# Patient Record
Sex: FEMALE | Race: WHITE | NOT HISPANIC OR LATINO | Employment: UNEMPLOYED | URBAN - METROPOLITAN AREA
[De-identification: names, ages, dates, MRNs, and addresses within clinical notes are randomized per-mention and may not be internally consistent; named-entity substitution may affect disease eponyms.]

---

## 2017-12-11 ENCOUNTER — ALLSCRIPTS OFFICE VISIT (OUTPATIENT)
Dept: OTHER | Facility: OTHER | Age: 57
End: 2017-12-11

## 2017-12-12 ENCOUNTER — ALLSCRIPTS OFFICE VISIT (OUTPATIENT)
Dept: OTHER | Facility: OTHER | Age: 57
End: 2017-12-12

## 2017-12-12 DIAGNOSIS — E11.9 TYPE 2 DIABETES MELLITUS WITHOUT COMPLICATIONS (HCC): ICD-10-CM

## 2017-12-12 DIAGNOSIS — E78.5 HYPERLIPIDEMIA: ICD-10-CM

## 2017-12-12 NOTE — PROGRESS NOTES
Assessment  Assessed   1  Tachycardia (785 0) (R00 0)  2  Tobacco use (305 1) (Z72 0)  3  Hyperlipidemia (272 4) (E78 5)  4  Type 2 diabetes mellitus (250 00) (E11 9)  5  Non-compliant patient (V15 81) (Z91 19)    Plan  Chest pain, Murmur, Tachycardia    · EKG/ECG- POC; Status:Active - Perform Order,Retrospective By Protocol Authorization; Requested for:[id number];   Perform: In Office; Due:60Cez8541; Last Updated Cyndy Jeff; 12/11/2017 11:09:13 AM;Ordered; For:Chest pain, Murmur, Tachycardia; Ordered By:Maribell Rogers; Discussion/Summary  Cardiology Discussion Summary Free Text Note Form [facility]:   30-year-old female with known history of diabetes hypertension hyperlipidemia smoking-negative stress test for ischemia-in 2015-----presents after 2-1/2 year period--with failure  to take any of her medicines or see any medical physicians-over the past year--- has symptoms of polyuria polydipsia weight loss and tachycardia--all consistent with dehydration and uncontrolled diabetes--- patient is in no distress and has no symptoms of angina or CHF--no symptoms of bleeding fevers infection depression  was apologetic for failing to take her medicines keeping regular appointments etc  and lengthy discussion with patient was undertaken relating the importance of regular medical care particular with underlying serious conditions--- I will request a prompt appointment with internal medicine and patient will need complete cancer testing laboratory testing thyroid testing vaccinations and reinitiation of diabetic medicines--- patient was advised to report to emergency room should any symptoms at all develop-that are changed from her baseline  likely recovering from shingles and has residual healing rash right [id number] with dermatology if any problems develop her previous burning and discomforts have resolved    patient is cardiovascular stable   No angina, CHF or symptomatic ectopy-other than her appreciation of faster heartbeats with exertion likely reflecting sinus tachycardia due to volume depletion    concepts of signs and symptoms of heart disease-including ischemia arrhythmia CHF  of complete Smoking cessation discussedany problems promptly to medical attention  measures reviewed  agrees to be compliant with appointment with internal medicine primary care doctor promptly for complete medical evaluation and treatment-she also reminded to follow-up with ophthalmology podiatry and to embark on complete smoking cessation--emphasized need for regular follow-ups and be compliant with medical therapyquestions answered and patient understood the importance messages of this conversation  appointment with internal medicine was requested  Chief Complaint  Chief Complaint Free Text Note Form: Patient seen for follow-up cardiac evaluation--she presents after not being seen in over 2-1/2 years  Known history of diabetes HTN and HPL  smoking      History of Present Illness  Cardiology HPI Free Text Note Form [facility]: Patient presents to the office for cardiac evaluation  --Last visit was April 2015--- patient states that she has not been taking any of her medicines for at least one year--she continues to smoke greater than a half a pack per day--she states she's lost 20 pounds over the past year--she complains of polyuria polydipsia--- she states she had recent painful rash on her right back radiating to her ribs anteriorly-about 2 months ago which has resolved--she thought it was shingles due to the pain and burning which appears likely rashes almost resolved  is walking up and down the halls and in and out of the office from the parking lot with no complaints of exertional chest pains no PND edema orthopnea no wheezing hemoptysis congestive heart failure--does feel her heart beating faster when she does physical activity--no dizziness lightheadedness syncope seizures --no feelings of irregular pulse rates fast pulse rates at rest--no melena hematuria hematochezia dysphasia nausea vomiting melena hematuria hematochezia headaches diplopia claudication  her own cleaning and shopping and cooking without difficulty no exertional ischemic symptoms    has not gone for mammograms colonoscopies cancer screening testing and has had no blood tests since 2015has never seen PCP as directedthe importance of maintaining good sugar control and that many of her symptoms of weight loss frequent urination frequent thirst lack of energy may reflect large component of uncontrolled diabetes-she was advised she'll need complete evaluation for other diseases which may coexist  shows sinus tachycardia with wide spread ST-T wave abnormalities--patient has known ST-T wave abnormalities in the past  denies dysphasia depression trauma headaches diplopia presyncope claudication myositis unusual travel diarrhea melena hematuria hematochezia abdominal pain shaking chills  relate that over the last year she's been progressively feeling more fatigued with less energy      Known history of HTN and HPL  She is complaining of half of fourth and all of her fifth left digit numbness x 6-7 months  Denies any injuries  She is also complaining of feeling a left sided chest pain with radiation into her left neck and arm when laying on her left side - she reports that it is mild and she is able to fall asleep with it  Denies any associated SOB  Feels better with movement and sitting up  Occasionally she does feel it randomly during the day  She describes it as an ache  She denies any exertional chest pains or shortness of breath  No angina, CHF, palpitations, syncope, seizures, CVA/TIA, dizziness, lightheadedness, amaurosis, orthostasis, myositis or edema  No urinary or bowel complaints  No rashes, fevers, arrhythmic symptoms, thromboembolic symptoms  She is complaining of looser bowels and diarrhea - she is on metformin  No change in urine or increased urination   She is able to physical activities with no problems of chest pains shortness of breath PND edema orthopnea orthostasis myositis claudication no history of MI no coronary calcifications on CAT scans in the past normal LV function on echo was in the past  has not stop smoking no wheezes hemoptysis night [id number]- normal LVEF  has a history of HTN - on atenolol, history of DM - on glybruide, history of hypertrigylceridermia - on Tricor  regular mammogram  No prior colonoscopy  [ Hypertension, hypertrigylceridermia, anxiety and DM  rheumatic fever, MI, thyroid problems, kidney issues, lung disease, CVA/TIA, hearing problems  ]  [ Mother  subdural hemorrhage, h/o CAHD s/p CABG in her 66's  Father  - h/o lung cancer  Brother - gout  ]  [ + Tobacco use    1 daughter, 1 son  wheezes hemoptysis night sweatsregular mammograms  CAT scan in  with no notation of CAD no emphysema  hemoglobin A1c 11 recent cholesterol 318       Review of Systems  Cardiology Female ROS:    Cardiac: No complaints of chest pain, no palpitations, no fainting -- Notes faster heartbeats with exertion without associated chest pains PND edema no CHF syncope lightheadedness  Skin: Rash located on the , but-- No complaints of nonhealing sores or skin rash  -- and-- as noted in HPI-- Recent shingles rash right side of her back  Genitourinary: No complaints of recurrent urinary tract infections, frequent urination at night, difficult urination, blood in urine, kidney stones, loss of bladder control, kidney problems, denies any birth control or hormone replacement, is not post menopausal, not currently pregnant  Psychological: No complaints of feeling depressed, anxiety, panic attacks, or difficulty concentrating  General: lack of energy/fatigue, but-- No complaints of trouble sleeping, lack of energy, fatigue, appetite changes, weight changes, fever, frequent infections, or night sweats  -- Notes less energy and strength over the past year   Respiratory: No complaints of shortness of breath, cough with sputum, or wheezing  HEENT: No complaints of serious problems, hearing problems, nose problems, throat problems, or snoring  Gastrointestinal: No complaints of liver problems, nausea, vomiting, heartburn, constipation, bloody stools, diarrhea, problems swallowing, adbominal pain, or rectal bleeding  Hematologic: No complaints of bleeding disorders, anemia, blood clots, or excessive brusing  Neurological: as noted in HPI-- and-- no numbness  Musculoskeletal: No complaints of arthritis, back pain, or painfull swelling  Active Problems  Problems   1  Arthropathy (716 90) (M12 9)  2  Carpal tunnel syndrome (354 0) (G56 00)  3  Chest pain (786 50) (R07 9)  4  Hyperlipidemia (272 4) (E78 5)  5  Hypertension (401 9) (I10)  6  Murmur (785 2) (R01 1)  7  Tachycardia (785 0) (R00 0)  8  Tobacco use (305 1) (Z72 0)  9  Type 2 diabetes mellitus (250 00) (E11 9)    Past Medical History  Problems   1  History of Anxiety (300 00) (F41 9)  2  History of Benign hypertension (401 1) (I10)  3  History of Encounter for screening mammogram for malignant neoplasm of breast (V76 12) (Z12 31)  4  History of Essential hypertriglyceridemia (272 1) (E78 1)  5  History of carpal tunnel syndrome (V12 49) (Z86 69)  6  History of chest pain (V13 89) (Z87 898)  7  History of sebaceous cyst (V13 3) (Z87 2)  8  Hyperlipidemia (272 4) (E78 5)  9  Type 2 diabetes mellitus (250 00) (E11 9)  Active Problems And Past Medical History Reviewed: The active problems and past medical history were reviewed and updated today  Surgical History  Problems   1  History of Cholecystectomy Laparoscopic  2  History of Knee Surgery  Surgical History Reviewed: The surgical history was reviewed and updated today  Family History  Mother   1  Family history of Acute Subdural Hematoma  2  Family history of CABG  3  Family history of Coronary Artery Disease (V17 49)  4   Family history of Family Health Status Of Mother -   Father   11  Family history of Family Health Status Of Father -   10  Family history of Lung Cancer (V16 1)  Brother   7  Family history of Gout (V18 19)  Family History Reviewed: The family history was reviewed and updated today  Social History  Problems    · Former smoker (U18 95) (A50 235)   · Marital History - Currently    · Tobacco use (305 1) (Z72 0)  Social History Reviewed: The social history was reviewed and updated today  Current Meds  1  Aspirin 81 MG Oral Tablet Delayed Release; TAKE 1 TABLET DAILY; Therapy: 24GTM1589 to Recorded  2  Atenolol 50 MG Oral Tablet; TAKE ONE TABLET BY MOUTH ONCE A DAY; Therapy: 66ZUZ5766 to (Evaluate:2016)  Requested for: 73Cho2005; Last Rx:[id number] Ordered  3  Chantix 1 MG Oral Tablet; TAKE 1 TABLET TWICE DAILY; Therapy: 65QAG1012 to (Evaluate:2014)  Requested for: 91VJV4511; Last Rx:37Htq0655 Ordered  4  Fenofibrate 145 MG Oral Tablet (Tricor); TAKE ONE TABLET BY MOUTH ONCE A DAY; Therapy: 01TWX1581 to (Evaluate:2016)  Requested for: 23Thr4206; Last Rx:31Zzl5471; Status: ACTIVE - Transmit to Pharmacy - Awaiting Verification Ordered  5  GlyBURIDE 5 MG Oral Tablet; TAKE TWO TABLETS BY MOUTH TWICE DAILY; Therapy: 14WXH8311 to (Evaluate:2016)  Requested for: 20Bth6523; Last Rx:86Vni1491 Ordered  6  Lantus SoloStar 100 UNIT/ML Subcutaneous Solution Pen-injector; inject 16 units in the evening; Therapy: 76Zld5247 to (Last Rx:87Qiz4881) Ordered  7  Lisinopril-Hydrochlorothiazide 10-12 5 MG Oral Tablet; TAKE TWO TABLETS BY MOUTH EVERY DAY; Therapy: 78QWR1711 to (Evaluate:2016)  Requested for: 66Mzz0741; Last Rx:74Kdt5974; Status: ACTIVE - Transmit to [facility] Verification Ordered  8  LORazepam 1 MG Oral Tablet; TAKE ONE TABLET BY MOUTH TWICE DAILY AS NEEDED FOR ANXIETY; Therapy: 42FAL5433 to (Evaluate:21Vrr3456); Last Rx:80Akw9653 Ordered  9   MetFORMIN HCl - 500 MG Oral Tablet; take 1 tab daily; Therapy: 95DAQ7714 to (Evaluate:16Apr2016)  Requested for: 22Apr2015; Last Rx:69Kut9468 Ordered  10  Omeprazole 20 MG Oral Capsule Delayed Release; TAKE ONE CAPSULE BY MOUTH  ONCE A DAY; Therapy: 40EXT4976 to 066 [id number] 0211)  Requested for: 52Syk1916; Last  Rx:74Hoj8158; Status: ACTIVE - Transmit to Pharmacy - Awaiting Verification Ordered  Medication List Reviewed: The medication list was reviewed and updated today  Allergies  Medication   1  Adhesive Bandages MISC  2  Adhesive Tape TAPE  Non-Medication   3  Adhesive Tape    Vitals  Vital Signs    Recorded: 69Zdb7309 10:46AM   Heart Rate 297   Systolic 497   Diastolic 84   Height 5 ft 4 in   Weight 112 lb 8 0 oz   BMI Calculated 19 31   BSA Calculated 1 54       Physical Exam   Constitutional  General appearance: No acute distress, well appearing and well nourished  -- No acute distress -frail thin-appearing female  Eyes  Ophthalmoscopic examination: Abnormal  -- Strabismus noted  Ears, Nose, Mouth, and Throat - External inspection of ears and nose: Normal without deformities or discharge  -- Oropharynx: Clear, nares are clear, mucous membranes are moist   Neck  Neck and thyroid: Normal, supple, trachea midline, no thyromegaly  Pulmonary  Respiratory effort: No increased work of breathing or signs of respiratory distress  Auscultation of lungs: Clear to auscultation, no rales, no rhonchi, no wheezing, good air movement  Cardiovascular  Palpation of heart: Normal PMI, no thrills  Auscultation of heart: Abnormal  -- Normal S1, S2  Soft FABBY LSB  Carotid pulses: Normal, 2+ bilaterally  Peripheral vascular exam: Normal pulses throughout, no tenderness, erythema or swelling  Abdominal aorta: Normal    Pedal pulses: Normal, 2+ bilaterally  Examination of extremities for edema and/or varicosities: Normal    Chest - Chest: Normal   Abdomen  Abdomen: Non-tender and no distention     Liver and spleen: No hepatomegaly or splenomegaly  Musculoskeletal Gait and station: Normal gait  -- Digits and nails: Normal without clubbing or cyanosis  Skin - Examination of the skin for lesions: Abnormal -- Suggestion of healing shingles rash right mid-upper back    Neurologic - Speech: Normal    Psychiatric - Orientation to person, place, and time: Normal -- Mood and affect: Normal       Signatures   Electronically signed by : PÉREZ Manzanares ; Dec 11 2017 11:44AM EST                       (Author)    Electronically signed by : PÉREZ Manzanares ; Dec 11 2017  4:30PM EST                       (Author)    Electronically signed by : PÉREZ Manzanares ; Dec 11 2017  4:40PM EST                       (Author)

## 2017-12-13 NOTE — PROGRESS NOTES
Assessment    1  Type 2 diabetes mellitus (250 00) (E11 9)   2  Hyperlipidemia (272 4) (E78 5)   3  Tachycardia (785 0) (R00 0)   4  Carpal tunnel syndrome (354 0) (G56 00)   5  Cigarette nicotine dependence without complication (444 1) (D10 772)    Plan  Cigarette nicotine dependence without complication    · We recommend you quit smoking  Time spent counseling today was greater than 3minutes ; Status:Complete;   Done: [id number]   · You need to quit smoking ; Status:Complete;   Done: 19SMN5593  Health Maintenance    · Zoster (Zostavax) (Zoster (Zostavax)); INJECT 1 UNT Once  Hyperlipidemia    · Atorvastatin Calcium 20 MG Oral Tablet; TAKE 1 TABLET DAILY   · Fenofibrate 145 MG Oral Tablet (Tricor); TAKE ONE TABLET BY MOUTH ONCE ADAY   · (1) LIPID PANEL, FASTING; Status:Active; Requested for:61Xjz4720;    · (1) TSH; Status:Active; Requested for:49Ayk9323; Tachycardia    · Metoprolol Succinate ER 25 MG Oral Tablet Extended Release 24 Hour; TAKE 1TABLET DAILY  Type 2 diabetes mellitus    · GlipiZIDE XL 10 MG Oral Tablet Extended Release 24 Hour; TAKE 1 TABLET DAILY   · Januvia 100 MG Oral Tablet; TAKE 1 TABLET DAILY   · (1) CBC/PLT/DIFF; Status:Active; Requested for:04Sxq8549;    · (1) COMPREHENSIVE METABOLIC PANEL; Status:Active; Requested for:73Iof5359;    · (1) HEMOGLOBIN A1C; Status:Active; Requested for:79Yne5689;    · (1) MICROALBUMIN CREATININE RATIO, RANDOM URINE; Status:Active; Requestedfor:82Pjx8633;     Discussion/Summary  Discussion Summary: This is a patient of Dr Shruthi Marks who has not been taking her medications for the last 2 years  2 diabetes-he was previously prescribed metformin and glyburide along with Lantus but she is not taking any of them  She did develop diarrhea with the metformin in the past  I will start her on Januvia and glipizide  She was told to get blood work done to see what her hemoglobin A1c level is  She was told to check her blood glucose levels at home   I will do the foot exam in the next visit  her blood pressure is normal  The metoprolol was started for the tachycardia -the lipid profile was very abnormal but she has not been taking her medication  Fenofibrate and atorvastatin were sent to the pharmacy  smoker-she was counseled to quit smoking  work was ordered  Counseling Documentation With Imm: The patient was counseled regarding diagnostic results,-- instructions for management,-- risk factor reductions,-- risks and benefits of treatment options,-- importance of compliance with treatment  Medication SE Review and Pt Understands Tx: Possible side effects of new medications were reviewed with the patient/guardian today  The treatment plan was reviewed with the patient/guardian  The patient/guardian understands and agrees with the treatment plan      Chief Complaint  Chief Complaint Free Text Note Form: establish care      History of Present Illness  Smoking Cessation (Brief): The patient is being seen for clinic follow-up for tobacco cessation assistance  The patient smokes cigarettes  Tobacco use began 40 year(s) ago  The patient smokes 1/2 pack per day  Carpal Tunnel Syndrome (Follow-Up): The patient is being seen for follow-up of bilateral carpal tunnel syndrome  The patient reports no change in the condition  Comorbid Illnesses: diabetes mellitus  She has had no significant interval events  The patient is currently asymptomatic  The patient is not currently on medication for this problem  Hyperlipidemia (Follow-Up): The patient states her hyperlipidemia has been stable since the last visit  Comorbid Illnesses: diabetes mellitus-- and-- hypertension  She has no significant interval events  Symptoms: The patient is currently asymptomatic  Medications: the patient is not adherent with her medication regimen  -- She denies medication side effects  Diabetes Type II (Follow-Up): The patient states she has been doing poorly with her Type II Diabetes control since the last visit  Comorbid Illnesses: hypertension,-- tobacco use-- and-- hyperlipidemia  She has no known diabetic complications  She has no significant interval events  Symptoms: The patient is currently asymptomatic  Medications: the patient is not adherent with her medication regimen  -- She denies medication side effects  Review of Systems  Complete-Female:  Constitutional: No fever, no chills, feels well, no tiredness, no recent weight gain or weight loss  Eyes: No complaints of eye pain, no red eyes, no eyesight problems, no discharge, no dry eyes, no itching of eyes  ENT: no complaints of earache, no loss of hearing, no nose bleeds, no nasal discharge, no sore throat, no hoarseness  Cardiovascular: No complaints of slow heart rate, no fast heart rate, no chest pain, no palpitations, no leg claudication, no lower extremity edema  Respiratory: No complaints of shortness of breath, no wheezing, no cough, no SOB on exertion, no orthopnea, no PND  Gastrointestinal: No complaints of abdominal pain, no constipation, no nausea or vomiting, no diarrhea, no bloody stools  Genitourinary: No complaints of dysuria, no incontinence, no pelvic pain, no dysmenorrhea, no vaginal discharge or bleeding  Musculoskeletal: No complaints of arthralgias, no myalgias, no joint swelling or stiffness, no limb pain or swelling  Integumentary: No complaints of skin rash or lesions, no itching, no skin wounds, no breast pain or lump  Neurological: No complaints of headache, no confusion, no convulsions, no numbness, no dizziness or fainting, no tingling, no limb weakness, no difficulty walking  Psychiatric: Not suicidal, no sleep disturbance, no anxiety or depression, no change in personality, no emotional problems  Endocrine: No complaints of proptosis, no hot flashes, no muscle weakness, no deepening of the voice, no feelings of weakness    Hematologic/Lymphatic: No complaints of swollen glands, no swollen glands in the neck, does not bleed easily, does not bruise easily  Active Problems  1  Arthropathy (716 90) (M12 9)   2  Carpal tunnel syndrome (354 0) (G56 00)   3  Hyperlipidemia (272 4) (E78 5)   4  Hypertension (401 9) (I10)   5  Murmur (785 2) (R01 1)   6  Non-compliant patient (V15 81) (Z91 19)   7  Tachycardia (785 0) (R00 0)   8  Tobacco use (305 1) (Z72 0)   9  Type 2 diabetes mellitus (250 00) (E11 9)    Past Medical History    1  History of Anxiety (300 00) (F41 9)   2  History of Benign hypertension (401 1) (I10)   3  History of Encounter for screening mammogram for malignant neoplasm of breast (V76 12) (Z12 31)   4  History of Essential hypertriglyceridemia (272 1) (E78 1)   5  History of carpal tunnel syndrome (V12 49) (Z86 69)   6  History of chest pain (V13 89) (Z87 898)   7  History of chest pain (V13 89) (Z87 898)   8  History of sebaceous cyst (V13 3) (Z87 2)   9  Hyperlipidemia (272 4) (E78 5)   10  Type 2 diabetes mellitus (250 00) (E11 9)  Active Problems And Past Medical History Reviewed: The active problems and past medical history were reviewed and updated today  Surgical History    1  History of Cholecystectomy Laparoscopic   2  History of Knee Surgery  Surgical History Reviewed: The surgical history was reviewed and updated today  Family History  Mother    1  Family history of Acute Subdural Hematoma   2  Family history of CABG   3  Family history of Coronary Artery Disease (V17 49)   4  Family history of Family Health Status Of Mother -   Father    11  Family history of Family Health Status Of Father -    10  Family history of Lung Cancer (V16 1)  Brother    7  Family history of Gout (V18 19)  Family History Reviewed: The family history was reviewed and updated today  Social History     · Former smoker (H80 60) (D31 132)   · Marital History - Currently    · Tobacco use (305 1) (Z72 0)  Social History Reviewed: The social history was reviewed and updated today   The social history was reviewed and is unchanged  Current Meds   1  Aspirin 81 MG Oral Tablet Delayed Release; TAKE 1 TABLET DAILY; Therapy: 13MDN5609 to Recorded   2  Fenofibrate 145 MG Oral Tablet; TAKE ONE TABLET BY MOUTH ONCE A DAY; Therapy: 75QIP4389 to (Evaluate:23Oct2016)  Requested for: 48Coh3020; Last Rx:67Pxk4419; Status: ACTIVE - Transmit to Pharmacy - Awaiting Verification Ordered    Allergies  1  Adhesive Bandages MISC   2  Adhesive Tape TAPE    3  Adhesive Tape    Vitals  Signs   Recorded: 71Rqy3585 01:06PM   Heart Rate: 568  Systolic: 617  Diastolic: 72  Height: 5 ft 4 in  Weight: 111 lb 4 oz  BMI Calculated: 19 1  BSA Calculated: 1 52  O2 Saturation: 98    Physical Exam   Constitutional  General appearance: No acute distress, well appearing and well nourished  Head and Face  Head and face: Normal    Palpation of the face and sinuses: No sinus tenderness  Eyes  Conjunctiva and lids: No swelling, erythema or discharge  Pupils and irises: Equal, round, reactive to light  Ears, Nose, Mouth, and Throat  External inspection of ears and nose: Normal    Otoscopic examination: Tympanic membranes translucent with normal light reflex  Canals patent without erythema  Oropharynx: Normal with no erythema, edema, exudate or lesions  Neck  Neck: Supple, symmetric, trachea midline, no masses  Thyroid: Normal, no thyromegaly  Pulmonary  Respiratory effort: No increased work of breathing or signs of respiratory distress  Auscultation of lungs: Clear to auscultation  Cardiovascular  Palpation of heart: Normal PMI, no thrills  Auscultation of heart: Normal rate and rhythm, normal S1 and S2, no murmurs  Examination of extremities for edema and/or varicosities: Normal    Abdomen  Abdomen: Non-tender, no masses  Liver and spleen: No hepatomegaly or splenomegaly  Lymphatic  Palpation of lymph nodes in neck: No lymphadenopathy     Musculoskeletal  Gait and station: Normal    Skin  Skin and subcutaneous tissue: Normal without rashes or lesions  Neurologic  Cranial nerves: Cranial nerves II-XII intact  Cortical function: Normal mental status  Reflexes: 2+ and symmetric  Sensation: No sensory loss  Psychiatric  Judgment and insight: Normal    Mood and affect: Normal        Future Appointments    Date/Time Provider Specialty Site   01/17/2018 12:45 PM PÉREZ Sherwood   [facility]       Signatures   Electronically signed by : PÉREZ Calderon ; Dec 12 2017  5:46PM EST                       (Author)

## 2018-01-22 VITALS
WEIGHT: 111.25 LBS | DIASTOLIC BLOOD PRESSURE: 72 MMHG | BODY MASS INDEX: 18.99 KG/M2 | OXYGEN SATURATION: 98 % | HEART RATE: 123 BPM | HEIGHT: 64 IN | SYSTOLIC BLOOD PRESSURE: 100 MMHG

## 2018-01-23 VITALS
SYSTOLIC BLOOD PRESSURE: 128 MMHG | HEIGHT: 64 IN | DIASTOLIC BLOOD PRESSURE: 84 MMHG | WEIGHT: 112.5 LBS | BODY MASS INDEX: 19.21 KG/M2 | HEART RATE: 100 BPM

## 2018-06-08 ENCOUNTER — APPOINTMENT (OUTPATIENT)
Dept: LAB | Facility: CLINIC | Age: 58
End: 2018-06-08
Payer: COMMERCIAL

## 2018-06-08 DIAGNOSIS — E78.5 HYPERLIPIDEMIA: ICD-10-CM

## 2018-06-08 DIAGNOSIS — E11.9 TYPE 2 DIABETES MELLITUS WITHOUT COMPLICATIONS (HCC): ICD-10-CM

## 2018-06-08 LAB
ALBUMIN SERPL BCP-MCNC: 3.5 G/DL (ref 3.5–5)
ALP SERPL-CCNC: 44 U/L (ref 46–116)
ALT SERPL W P-5'-P-CCNC: 23 U/L (ref 12–78)
ANION GAP SERPL CALCULATED.3IONS-SCNC: 8 MMOL/L (ref 4–13)
AST SERPL W P-5'-P-CCNC: 16 U/L (ref 5–45)
BASOPHILS # BLD AUTO: 0.06 THOUSANDS/ΜL (ref 0–0.1)
BASOPHILS NFR BLD AUTO: 1 % (ref 0–1)
BILIRUB SERPL-MCNC: 0.18 MG/DL (ref 0.2–1)
BUN SERPL-MCNC: 29 MG/DL (ref 5–25)
CALCIUM SERPL-MCNC: 9.4 MG/DL (ref 8.3–10.1)
CHLORIDE SERPL-SCNC: 106 MMOL/L (ref 100–108)
CHOLEST SERPL-MCNC: 168 MG/DL (ref 50–200)
CO2 SERPL-SCNC: 24 MMOL/L (ref 21–32)
CREAT SERPL-MCNC: 1.13 MG/DL (ref 0.6–1.3)
CREAT UR-MCNC: 61.7 MG/DL
EOSINOPHIL # BLD AUTO: 0.27 THOUSAND/ΜL (ref 0–0.61)
EOSINOPHIL NFR BLD AUTO: 2 % (ref 0–6)
ERYTHROCYTE [DISTWIDTH] IN BLOOD BY AUTOMATED COUNT: 12.4 % (ref 11.6–15.1)
EST. AVERAGE GLUCOSE BLD GHB EST-MCNC: 154 MG/DL
GFR SERPL CREATININE-BSD FRML MDRD: 54 ML/MIN/1.73SQ M
GLUCOSE P FAST SERPL-MCNC: 151 MG/DL (ref 65–99)
HBA1C MFR BLD: 7 % (ref 4.2–6.3)
HCT VFR BLD AUTO: 33.8 % (ref 34.8–46.1)
HDLC SERPL-MCNC: 36 MG/DL (ref 40–60)
HGB BLD-MCNC: 10.8 G/DL (ref 11.5–15.4)
IMM GRANULOCYTES # BLD AUTO: 0.06 THOUSAND/UL (ref 0–0.2)
IMM GRANULOCYTES NFR BLD AUTO: 1 % (ref 0–2)
LDLC SERPL CALC-MCNC: 71 MG/DL (ref 0–100)
LYMPHOCYTES # BLD AUTO: 2.57 THOUSANDS/ΜL (ref 0.6–4.47)
LYMPHOCYTES NFR BLD AUTO: 23 % (ref 14–44)
MCH RBC QN AUTO: 29.2 PG (ref 26.8–34.3)
MCHC RBC AUTO-ENTMCNC: 32 G/DL (ref 31.4–37.4)
MCV RBC AUTO: 91 FL (ref 82–98)
MICROALBUMIN UR-MCNC: 76.1 MG/L (ref 0–20)
MICROALBUMIN/CREAT 24H UR: 123 MG/G CREATININE (ref 0–30)
MONOCYTES # BLD AUTO: 0.6 THOUSAND/ΜL (ref 0.17–1.22)
MONOCYTES NFR BLD AUTO: 5 % (ref 4–12)
NEUTROPHILS # BLD AUTO: 7.85 THOUSANDS/ΜL (ref 1.85–7.62)
NEUTS SEG NFR BLD AUTO: 68 % (ref 43–75)
NONHDLC SERPL-MCNC: 132 MG/DL
NRBC BLD AUTO-RTO: 0 /100 WBCS
PLATELET # BLD AUTO: 571 THOUSANDS/UL (ref 149–390)
PMV BLD AUTO: 9.8 FL (ref 8.9–12.7)
POTASSIUM SERPL-SCNC: 3.8 MMOL/L (ref 3.5–5.3)
PROT SERPL-MCNC: 7.6 G/DL (ref 6.4–8.2)
RBC # BLD AUTO: 3.7 MILLION/UL (ref 3.81–5.12)
SODIUM SERPL-SCNC: 138 MMOL/L (ref 136–145)
TRIGL SERPL-MCNC: 303 MG/DL
TSH SERPL DL<=0.05 MIU/L-ACNC: 2.24 UIU/ML (ref 0.36–3.74)
WBC # BLD AUTO: 11.41 THOUSAND/UL (ref 4.31–10.16)

## 2018-06-08 PROCEDURE — 84443 ASSAY THYROID STIM HORMONE: CPT

## 2018-06-08 PROCEDURE — 82043 UR ALBUMIN QUANTITATIVE: CPT

## 2018-06-08 PROCEDURE — 85025 COMPLETE CBC W/AUTO DIFF WBC: CPT

## 2018-06-08 PROCEDURE — 82570 ASSAY OF URINE CREATININE: CPT

## 2018-06-08 PROCEDURE — 83036 HEMOGLOBIN GLYCOSYLATED A1C: CPT

## 2018-06-08 PROCEDURE — 3060F POS MICROALBUMINURIA REV: CPT | Performed by: INTERNAL MEDICINE

## 2018-06-08 PROCEDURE — 80061 LIPID PANEL: CPT

## 2018-06-08 PROCEDURE — 36415 COLL VENOUS BLD VENIPUNCTURE: CPT

## 2018-06-08 PROCEDURE — 80053 COMPREHEN METABOLIC PANEL: CPT

## 2018-06-11 RX ORDER — GLYBURIDE 5 MG/1
2 TABLET ORAL 2 TIMES DAILY
COMMUNITY
Start: 2014-03-31 | End: 2018-06-12

## 2018-06-11 RX ORDER — LISINOPRIL AND HYDROCHLOROTHIAZIDE 12.5; 1 MG/1; MG/1
2 TABLET ORAL DAILY
COMMUNITY
Start: 2014-03-31 | End: 2018-06-12

## 2018-06-11 RX ORDER — VARENICLINE TARTRATE 1 MG/1
1 TABLET, FILM COATED ORAL 2 TIMES DAILY
COMMUNITY
Start: 2014-06-17 | End: 2018-06-12

## 2018-06-11 RX ORDER — METOPROLOL SUCCINATE 25 MG/1
1 TABLET, EXTENDED RELEASE ORAL DAILY
COMMUNITY
Start: 2017-12-12 | End: 2018-06-12 | Stop reason: SDUPTHER

## 2018-06-11 RX ORDER — OMEPRAZOLE 20 MG/1
1 CAPSULE, DELAYED RELEASE ORAL DAILY
COMMUNITY
Start: 2014-03-31 | End: 2018-06-12 | Stop reason: SDUPTHER

## 2018-06-11 RX ORDER — ATORVASTATIN CALCIUM 20 MG/1
1 TABLET, FILM COATED ORAL DAILY
COMMUNITY
Start: 2017-12-12 | End: 2018-06-12 | Stop reason: SDUPTHER

## 2018-06-11 RX ORDER — ATENOLOL 50 MG/1
1 TABLET ORAL DAILY
COMMUNITY
Start: 2014-03-31 | End: 2018-06-12

## 2018-06-11 RX ORDER — GLIPIZIDE 10 MG/1
1 TABLET, FILM COATED, EXTENDED RELEASE ORAL DAILY
COMMUNITY
Start: 2017-12-12 | End: 2018-06-12 | Stop reason: SDUPTHER

## 2018-06-11 RX ORDER — FENOFIBRATE 145 MG/1
1 TABLET, COATED ORAL DAILY
COMMUNITY
Start: 2014-03-31 | End: 2018-06-12 | Stop reason: SDUPTHER

## 2018-06-11 RX ORDER — LORAZEPAM 1 MG/1
1 TABLET ORAL 2 TIMES DAILY PRN
COMMUNITY
Start: 2014-03-31 | End: 2018-06-12

## 2018-06-12 ENCOUNTER — OFFICE VISIT (OUTPATIENT)
Dept: INTERNAL MEDICINE CLINIC | Facility: CLINIC | Age: 58
End: 2018-06-12
Payer: COMMERCIAL

## 2018-06-12 VITALS
HEIGHT: 64 IN | HEART RATE: 112 BPM | WEIGHT: 133.2 LBS | DIASTOLIC BLOOD PRESSURE: 80 MMHG | BODY MASS INDEX: 22.74 KG/M2 | OXYGEN SATURATION: 100 % | SYSTOLIC BLOOD PRESSURE: 132 MMHG

## 2018-06-12 DIAGNOSIS — R00.0 TACHYCARDIA: ICD-10-CM

## 2018-06-12 DIAGNOSIS — N18.30 CKD (CHRONIC KIDNEY DISEASE) STAGE 3, GFR 30-59 ML/MIN (HCC): ICD-10-CM

## 2018-06-12 DIAGNOSIS — F17.210 CIGARETTE NICOTINE DEPENDENCE WITHOUT COMPLICATION: ICD-10-CM

## 2018-06-12 DIAGNOSIS — E11.42 TYPE 2 DIABETES MELLITUS WITH DIABETIC POLYNEUROPATHY, WITHOUT LONG-TERM CURRENT USE OF INSULIN (HCC): Primary | ICD-10-CM

## 2018-06-12 DIAGNOSIS — E78.2 MIXED HYPERLIPIDEMIA: ICD-10-CM

## 2018-06-12 DIAGNOSIS — G63 POLYNEUROPATHY ASSOCIATED WITH UNDERLYING DISEASE (HCC): ICD-10-CM

## 2018-06-12 DIAGNOSIS — K21.9 GERD WITHOUT ESOPHAGITIS: ICD-10-CM

## 2018-06-12 DIAGNOSIS — I10 ESSENTIAL HYPERTENSION: ICD-10-CM

## 2018-06-12 DIAGNOSIS — R79.89 ABNORMAL CBC: ICD-10-CM

## 2018-06-12 DIAGNOSIS — K59.04 CHRONIC IDIOPATHIC CONSTIPATION: ICD-10-CM

## 2018-06-12 PROCEDURE — 3066F NEPHROPATHY DOC TX: CPT | Performed by: INTERNAL MEDICINE

## 2018-06-12 PROCEDURE — 3079F DIAST BP 80-89 MM HG: CPT | Performed by: INTERNAL MEDICINE

## 2018-06-12 PROCEDURE — 3075F SYST BP GE 130 - 139MM HG: CPT | Performed by: INTERNAL MEDICINE

## 2018-06-12 PROCEDURE — 99215 OFFICE O/P EST HI 40 MIN: CPT | Performed by: INTERNAL MEDICINE

## 2018-06-12 PROCEDURE — 3008F BODY MASS INDEX DOCD: CPT | Performed by: INTERNAL MEDICINE

## 2018-06-12 RX ORDER — ATORVASTATIN CALCIUM 20 MG/1
20 TABLET, FILM COATED ORAL DAILY
Qty: 90 TABLET | Refills: 1 | Status: SHIPPED | OUTPATIENT
Start: 2018-06-12 | End: 2018-09-04 | Stop reason: SDUPTHER

## 2018-06-12 RX ORDER — FENOFIBRATE 145 MG/1
145 TABLET, COATED ORAL DAILY
Qty: 90 TABLET | Refills: 1 | Status: SHIPPED | OUTPATIENT
Start: 2018-06-12 | End: 2018-09-04 | Stop reason: SDUPTHER

## 2018-06-12 RX ORDER — GLIPIZIDE 10 MG/1
10 TABLET, FILM COATED, EXTENDED RELEASE ORAL DAILY
Qty: 90 TABLET | Refills: 1 | Status: SHIPPED | OUTPATIENT
Start: 2018-06-12 | End: 2018-09-04 | Stop reason: SDUPTHER

## 2018-06-12 RX ORDER — OMEPRAZOLE 20 MG/1
20 CAPSULE, DELAYED RELEASE ORAL DAILY
Qty: 90 CAPSULE | Refills: 1 | Status: SHIPPED | OUTPATIENT
Start: 2018-06-12 | End: 2018-09-04 | Stop reason: SDUPTHER

## 2018-06-12 RX ORDER — GABAPENTIN 300 MG/1
300 CAPSULE ORAL
Qty: 90 CAPSULE | Refills: 1 | Status: SHIPPED | OUTPATIENT
Start: 2018-06-12 | End: 2018-09-04 | Stop reason: SDUPTHER

## 2018-06-12 RX ORDER — METOPROLOL SUCCINATE 25 MG/1
25 TABLET, EXTENDED RELEASE ORAL DAILY
Qty: 90 TABLET | Refills: 1 | Status: SHIPPED | OUTPATIENT
Start: 2018-06-12 | End: 2018-09-04 | Stop reason: SDUPTHER

## 2018-06-12 NOTE — PROGRESS NOTES
INTERNAL MEDICINE OFFICE VISIT  [facility] AT [place] [address], [facility], [address] [place] [address]  Tel: [phone]      NAME: Bret Mckeon  AGE: 62 y o  SEX: female  : 1960   MRN: [id number]    DATE: 2018  TIME: 11:20 AM      Assessment and Plan:  1  Type 2 diabetes mellitus with diabetic polyneuropathy, without long-term current use of insulin ([facility])  The last hemoglobin A1c was 7 which is very well controlled on the present medications but she says that her blood glucose levels at home are about 250  She probably checks it after she eats  She was told to continue the same medications  - Ambulatory referral to Podiatry; Future  - Hemoglobin A1C; Future    2  Essential hypertension  Continue metoprolol  Even though she says that she is taking metoprolol, her heart rate is very high today  I also checked with the pharmacy and they do not thing that she is taking the metoprolol  It was ordered today  - CBC and differential; Future  - Comprehensive metabolic panel; Future    3  Mixed hyperlipidemia  Continue atorvastatin and fenofibrate  Lipid profile is within normal limits  - Lipid panel; Future  - TSH, 3rd generation; Future    4  Tachycardia  She is still tachycardic despite taking the metoprolol  I do have some questions about her compliance  5  Polyneuropathy associated with underlying disease ([address] )  She was started on gabapentin as she is complaining of pain in her legs  She was also told to quit smoking   - gabapentin (NEURONTIN) 300 mg capsule; Take 1 capsule (300 mg total) by mouth daily at bedtime  Dispense: 90 capsule; Refill: 1    6  CKD (chronic kidney disease) stage 3, GFR 30-59 ml/min  The creatinine is high and she was told to cut down on the use of ibuprofen which she takes daily for leg pain  She was also told to hydrate herself  7  Cigarette nicotine dependence without complication  She was counseled to quit smoking      8  GERD without esophagitis  Omeprazole was started as she has symptoms of heartburn and acid reflux   - omeprazole (PriLOSEC) 20 mg delayed release capsule; Take 1 capsule (20 mg total) by mouth daily  Dispense: 90 capsule; Refill: 1    9  Chronic idiopathic constipation  She was told to take the stool softener regularly and increase her water intake  If it does not improve, she will take MiraLax  10  Abnormal CBC  She has a low hemoglobin and hematocrit, a high platelet count and a high white count  She was told to see the hematologist   - Ambulatory referral to Hematology / Oncology; Future      - Counseling Documentation: patient was counseled regarding: diagnostic results, instructions for management, risk factor reductions, prognosis, patient and family education, impressions, risks and benefits of treatment options and importance of compliance with treatment  - Medication Side Effects: Adverse side effects of medications were reviewed with the patient/guardian today  Return for follow up visit in 4 months or earlier, if needed  Chief Complaint:  Chief Complaint   Patient presents with    Well Check         History of Present Illness:   Type 2 diabetes-patient says she has been taking the Januvia and the glipizide regularly  The hemoglobin A1c is normal   Hypertension-blood pressure is stable but she is tachycardic  She says that she is taking metoprolol but it does not seem like she is  She was told to bring in the pill bottles at the next visit  Hyperlipidemia-she has been taking the atorvastatin at the fenofibrate regularly  Peripheral neuropathy-she has been complaining of pain in her legs which could be multifactorial and I explained to her that it could be because of the diabetes and it could be because of the long-term smoking  Chronic kidney disease-her creatinine is high and she is in chronic kidney disease stage 3    She was explained to avoid NSAIDs and to keep herself well hydrated  Current smoker-she continues to smoke  GERD-she has been having symptoms of acid reflux for a few months  Constipation-she has constipation for the last few months and has not been taking anything for it  Abnormal CBC-she has a normal normal CBC for a couple of years but has not seen the the hematologist ever    The      Active Problem List:  Patient Active Problem List   Diagnosis    Arthropathy    Carpal tunnel syndrome    Cigarette nicotine dependence without complication    Mixed hyperlipidemia    Essential hypertension    Tachycardia    Type 2 diabetes mellitus with diabetic polyneuropathy, without long-term current use of insulin (HCC)    Polyneuropathy associated with underlying disease (HCC)    CKD (chronic kidney disease) stage 3, GFR 30-59 ml/min    Abnormal CBC    GERD without esophagitis    Chronic idiopathic constipation         Past Medical History:  Past Medical History:   Diagnosis Date    Anxiety     Benign hypertension     CTS (carpal tunnel syndrome)     Essential hypertriglyceridemia     Hyperlipidemia     last assessed 12/12/2017    Sebaceous cyst     Type 2 diabetes mellitus ([facility] [address] )     last assessed 12/12/2017         Past Surgical History:  Past Surgical History:   Procedure Laterality Date    CHOLECYSTECTOMY LAPAROSCOPIC      KNEE SURGERY Right          Family History:  Family History   Problem Relation Age of Onset    Other Mother      acute subdural hematoma    Coronary artery disease Mother      CABG    Lung cancer Father     Gout Brother          Social History:  Social History     Social History    Marital status: /Civil Union     Spouse name: N/A    Number of children: N/A    Years of education: N/A     Social History Main Topics    Smoking status: Current Every Day Smoker     Last attempt to quit: 09/2013    Smokeless tobacco: Never Used    Alcohol use No    Drug use: No    Sexual activity: Yes     Partners: Male     Other Topics Concern    None     Social History Narrative    None         Allergies:   Allergies   Allergen Reactions    Wound Dressing Adhesive          Medications:    Current Outpatient Prescriptions:     aspirin 81 MG tablet, Take 1 tablet by mouth daily, Disp: , Rfl:     atorvastatin (LIPITOR) 20 mg tablet, Take 1 tablet by mouth daily, Disp: , Rfl:     fenofibrate (TRICOR) 145 mg tablet, Take 1 tablet by mouth daily, Disp: , Rfl:     glipiZIDE (GLUCOTROL XL) 10 mg 24 hr tablet, Take 1 tablet by mouth daily, Disp: , Rfl:     metoprolol succinate (TOPROL-XL) 25 mg 24 hr tablet, Take 1 tablet by mouth daily, Disp: , Rfl:     omeprazole (PriLOSEC) 20 mg delayed release capsule, Take 1 capsule (20 mg total) by mouth daily, Disp: 90 capsule, Rfl: 1    sitaGLIPtin (JANUVIA) 100 mg tablet, Take 1 tablet by mouth daily, Disp: , Rfl:     gabapentin (NEURONTIN) 300 mg capsule, Take 1 capsule (300 mg total) by mouth daily at bedtime, Disp: 90 capsule, Rfl: 1      The following portions of the patient's history were reviewed and updated as appropriate: past medical history, past surgical history, family history, social history, allergies, current medications and active problem list       Review of Systems:  Constitutional: Denies fever, chills, weight gain, weight loss, fatigue  Eyes: Denies eye redness, eye discharge, double vision, change in visual acuity  ENT: Denies hearing loss, tinnitus, sneezing, nasal congestion, nasal discharge, sore throat   Respiratory: Denies cough, expectoration, hemoptysis, shortness of breath, wheezing  Cardiovascular: Denies chest pain, palpitations, lower extremity swelling, orthopnea, PND  Gastrointestinal: Denies abdominal pain, heartburn, nausea, vomiting, hematemesis, diarrhea, bloody stools  Genito-Urinary: Denies dysuria, frequency, difficulty in micturition, nocturia, incontinence  Musculoskeletal: Denies back pain, joint pain, muscle pain  Neurologic: Denies confusion, lightheadedness, syncope, headache, focal weakness, sensory changes, seizures  Endocrine: Denies polyuria, polydipsia, temperature intolerance  Allergy and Immunology: Denies hives, insect bite sensitivity  Hematological and Lymphatic: Denies bleeding problems, swollen glands   Psychological: Denies depression, suicidal ideation, anxiety, panic, mood swings  Dermatological: Denies pruritus, rash, skin lesion changes      Vitals:  Vitals:    06/12/18 1045   BP: 132/80   Pulse: (!) 112   SpO2: 100%       Body mass index is 23 23 kg/m²  Weight (last 2 days)     Date/Time   Weight    06/12/18 1045  60 4 (133 2)                Physical Examination:  General: Patient is not in acute distress  Awake, alert, responding to commands  No weight gain or loss  Head: Normocephalic  Atraumatic  Eyes: Conjunctiva and lids with no swelling, erythema or discharge  Both pupils normal sized, round and reactive to light  Sclera nonicteric  ENT: External examination of nose and ear normal  Otoscopic examination shows translucent tympanic membranes with patent canals without erythema  Oropharynx moist with no erythema, edema, exudate or lesions  Neck: Supple  JVP not raised  Trachea midline  No masses  No thyromegaly  Lungs: No signs of increased work of breathing or respiratory distress  Bilateral bronchovascular breath sounds with no crackles or rhonchi  Chest wall: No tenderness  Cardiovascular: Normal PMI  No thrills  Regular rate and rhythm  S1 and S2 normal  No murmur, rub or gallop  Gastrointestinal: Abdomen soft, nontender  No guarding or rigidity  Liver and spleen not palpable  Bowel sounds present  Neurologic: Cranial nerves II-XII intact   Cortical functions normal  Motor system - Reflexes 2+ and symmetrical  Sensations normal  Musculoskeletal: Gait normal  No joint tenderness  Integumentary: Skin normal with no rash or lesions  Lymphatic: No palpable lymph nodes in neck, axilla or groin  Extremities: No clubbing, cyanosis, edema or varicosities  Psychological: Judgement and insight normal  Mood and affect normal  Patient's shoes and socks removed  Right Foot/Ankle   Right Foot Inspection  Skin Exam: skin normal and skin intact no dry skin, no warmth, no callus, no erythema, no maceration, no abnormal color, no pre-ulcer, no ulcer and no callus                          Toe Exam: ROM and strength within normal limits  Sensory     Proprioception: diminished and intact   Monofilament testing: intact  Vascular  Capillary refills: < 3 seconds  The right DP pulse is 2+  The right PT pulse is 2+  Left Foot/Ankle  Left Foot Inspection  Skin Exam: skin normal and skin intactno dry skin, no warmth, no erythema, no maceration, normal color, no pre-ulcer, no ulcer and no callus                         Toe Exam: ROM and strength within normal limits                   Sensory     Proprioception: intact  Monofilament: intact  Vascular  Capillary refills: < 3 seconds  The left DP pulse is 2+  The left PT pulse is 2+  Assign Risk Category:  No deformity present; No loss of protective sensation;  No weak pulses       Risk: 0    Laboratory Results:  CBC with diff:   Lab Results   Component Value Date    WBC 11 41 (H) 06/08/2018    WBC 9 0 04/21/2015    RBC 3 70 (L) 06/08/2018    RBC 3 65 (L) 04/21/2015    HGB 10 8 (L) 06/08/2018    HGB 10 8 (L) 04/21/2015    HCT 33 8 (L) 06/08/2018    HCT 32 2 (L) 04/21/2015    MCV 91 06/08/2018    MCV 88 04/21/2015    MCH 29 2 06/08/2018    MCH 29 6 04/21/2015    RDW 12 4 06/08/2018    RDW 10 7 (L) 04/21/2015     (H) 06/08/2018     (H) 04/21/2015       CMP:  Lab Results   Component Value Date    CREATININE 1 13 06/08/2018    CREATININE 1 2 (H) 04/21/2015    BUN 29 (H) 06/08/2018    BUN 30 (H) 04/21/2015     06/08/2018     04/21/2015    K 3 8 06/08/2018    K 4 8 04/21/2015     06/08/2018     04/21/2015    CO2 24 06/08/2018    CO2 24 9 04/21/2015    GLUCOSE 293 (H) 04/21/2015 PROT 7 6 06/08/2018    PROT 6 8 04/21/2015    ALKPHOS 44 (L) 06/08/2018    ALKPHOS 45 (L) 04/21/2015    ALT 23 06/08/2018    ALT 33 04/21/2015    AST 16 06/08/2018    AST 16 04/21/2015       Lab Results   Component Value Date    HGBA1C 7 0 (H) 06/08/2018    HGBA1C 11 2 (H) 04/21/2015       No results found for: TROPONINI, CKMB, CKTOTAL    Lipid Profile:   Lab Results   Component Value Date    CHOL 168 06/08/2018    CHOL 318 04/21/2015     Lab Results   Component Value Date    HDL 36 (L) 06/08/2018    HDL 40 04/21/2015     Lab Results   Component Value Date    LDLCALC 71 06/08/2018    LDLCALC LDL- unable to 04/21/2015     Lab Results   Component Value Date    TRIG 303 (H) 06/08/2018    TRIG 457 04/21/2015         Health Maintenance:  Health Maintenance   Topic Date Due    HIV SCREENING  1960    Hepatitis C Screening  1960    CRC Screening: Colonoscopy  1960    PNEUMOCOCCAL POLYSACCHARIDE VACCINE AGE 2-64 HIGH RISK  01/16/1962    Diabetic Foot Exam  01/16/1970    OPHTHALMOLOGY EXAM  01/16/1970    DTaP,Tdap,and Td Vaccines (1 - Tdap) 01/16/1981    INFLUENZA VACCINE  09/01/2018    HEMOGLOBIN A1C  12/08/2018    URINE MICROALBUMIN  06/08/2019    Depression Screening PHQ-9  06/12/2019       There is no immunization history on file for this patient        Jolly Cole MD  6/12/2018,11:20 AM

## 2018-09-04 DIAGNOSIS — I10 ESSENTIAL HYPERTENSION: ICD-10-CM

## 2018-09-04 DIAGNOSIS — G63 POLYNEUROPATHY ASSOCIATED WITH UNDERLYING DISEASE (HCC): ICD-10-CM

## 2018-09-04 DIAGNOSIS — E78.2 MIXED HYPERLIPIDEMIA: ICD-10-CM

## 2018-09-04 DIAGNOSIS — K21.9 GERD WITHOUT ESOPHAGITIS: ICD-10-CM

## 2018-09-04 DIAGNOSIS — E11.42 TYPE 2 DIABETES MELLITUS WITH DIABETIC POLYNEUROPATHY, WITHOUT LONG-TERM CURRENT USE OF INSULIN (HCC): ICD-10-CM

## 2018-09-05 RX ORDER — GABAPENTIN 300 MG/1
300 CAPSULE ORAL
Qty: 90 CAPSULE | Refills: 0 | Status: SHIPPED | OUTPATIENT
Start: 2018-09-05 | End: 2018-11-05 | Stop reason: SDUPTHER

## 2018-09-05 RX ORDER — FENOFIBRATE 145 MG/1
145 TABLET, COATED ORAL DAILY
Qty: 90 TABLET | Refills: 1 | Status: SHIPPED | OUTPATIENT
Start: 2018-09-05 | End: 2018-11-05 | Stop reason: SDUPTHER

## 2018-09-05 RX ORDER — OMEPRAZOLE 20 MG/1
20 CAPSULE, DELAYED RELEASE ORAL DAILY
Qty: 90 CAPSULE | Refills: 1 | Status: SHIPPED | OUTPATIENT
Start: 2018-09-05 | End: 2018-11-05 | Stop reason: SDUPTHER

## 2018-09-05 RX ORDER — METOPROLOL SUCCINATE 25 MG/1
25 TABLET, EXTENDED RELEASE ORAL DAILY
Qty: 90 TABLET | Refills: 0 | Status: SHIPPED | OUTPATIENT
Start: 2018-09-05 | End: 2018-11-05 | Stop reason: SDUPTHER

## 2018-09-05 RX ORDER — GLIPIZIDE 10 MG/1
10 TABLET, FILM COATED, EXTENDED RELEASE ORAL DAILY
Qty: 90 TABLET | Refills: 0 | Status: SHIPPED | OUTPATIENT
Start: 2018-09-05 | End: 2018-11-05 | Stop reason: SDUPTHER

## 2018-09-05 RX ORDER — ATORVASTATIN CALCIUM 20 MG/1
20 TABLET, FILM COATED ORAL DAILY
Qty: 90 TABLET | Refills: 1 | Status: SHIPPED | OUTPATIENT
Start: 2018-09-05 | End: 2018-11-05 | Stop reason: SDUPTHER

## 2018-09-05 NOTE — TELEPHONE ENCOUNTER
From: Kathryn Lesch  Sent: 9/4/2018 6:43 PM EDT  Subject: Medication Renewal Request    Karine Brooks   [facility] would like a refill of the following medications:     omeprazole (PriLOSEC) 20 mg delayed release capsule Owen Sampson MD]     gabapentin (NEURONTIN) 300 mg capsule Owen Sampson MD]     atorvastatin (LIPITOR) 20 mg tablet Owen Sampson MD]     fenofibrate (TRICOR) 145 mg tablet Owen Sampson MD]     metoprolol succinate (TOPROL-XL) 25 mg 24 hr tablet Owen Sampson MD]     sitaGLIPtin (JANUVIA) 100 mg tablet Owen Sampson MD]     glipiZIDE (GLUCOTROL XL) 10 mg 24 hr tablet Owen Sampson MD]    [id number]

## 2018-10-16 ENCOUNTER — APPOINTMENT (OUTPATIENT)
Dept: LAB | Facility: CLINIC | Age: 58
End: 2018-10-16
Payer: COMMERCIAL

## 2018-10-16 DIAGNOSIS — E78.2 MIXED HYPERLIPIDEMIA: ICD-10-CM

## 2018-10-16 DIAGNOSIS — E11.42 TYPE 2 DIABETES MELLITUS WITH DIABETIC POLYNEUROPATHY, WITHOUT LONG-TERM CURRENT USE OF INSULIN (HCC): ICD-10-CM

## 2018-10-16 DIAGNOSIS — I10 ESSENTIAL HYPERTENSION: ICD-10-CM

## 2018-10-16 LAB
ALBUMIN SERPL BCP-MCNC: 3.6 G/DL (ref 3.5–5)
ALP SERPL-CCNC: 63 U/L (ref 46–116)
ALT SERPL W P-5'-P-CCNC: 17 U/L (ref 12–78)
ANION GAP SERPL CALCULATED.3IONS-SCNC: 8 MMOL/L (ref 4–13)
AST SERPL W P-5'-P-CCNC: 9 U/L (ref 5–45)
BASOPHILS # BLD AUTO: 0.09 THOUSANDS/ΜL (ref 0–0.1)
BASOPHILS NFR BLD AUTO: 1 % (ref 0–1)
BILIRUB SERPL-MCNC: 0.51 MG/DL (ref 0.2–1)
BUN SERPL-MCNC: 32 MG/DL (ref 5–25)
CALCIUM SERPL-MCNC: 9.1 MG/DL (ref 8.3–10.1)
CHLORIDE SERPL-SCNC: 106 MMOL/L (ref 100–108)
CHOLEST SERPL-MCNC: 190 MG/DL (ref 50–200)
CO2 SERPL-SCNC: 22 MMOL/L (ref 21–32)
CREAT SERPL-MCNC: 1.36 MG/DL (ref 0.6–1.3)
EOSINOPHIL # BLD AUTO: 0.22 THOUSAND/ΜL (ref 0–0.61)
EOSINOPHIL NFR BLD AUTO: 2 % (ref 0–6)
ERYTHROCYTE [DISTWIDTH] IN BLOOD BY AUTOMATED COUNT: 12.1 % (ref 11.6–15.1)
EST. AVERAGE GLUCOSE BLD GHB EST-MCNC: 292 MG/DL
GFR SERPL CREATININE-BSD FRML MDRD: 43 ML/MIN/1.73SQ M
GLUCOSE P FAST SERPL-MCNC: 276 MG/DL (ref 65–99)
HBA1C MFR BLD: 11.8 % (ref 4.2–6.3)
HCT VFR BLD AUTO: 38 % (ref 34.8–46.1)
HDLC SERPL-MCNC: 35 MG/DL (ref 40–60)
HGB BLD-MCNC: 12.3 G/DL (ref 11.5–15.4)
IMM GRANULOCYTES # BLD AUTO: 0.11 THOUSAND/UL (ref 0–0.2)
IMM GRANULOCYTES NFR BLD AUTO: 1 % (ref 0–2)
LDLC SERPL CALC-MCNC: 100 MG/DL (ref 0–100)
LYMPHOCYTES # BLD AUTO: 2.12 THOUSANDS/ΜL (ref 0.6–4.47)
LYMPHOCYTES NFR BLD AUTO: 17 % (ref 14–44)
MCH RBC QN AUTO: 27.9 PG (ref 26.8–34.3)
MCHC RBC AUTO-ENTMCNC: 32.4 G/DL (ref 31.4–37.4)
MCV RBC AUTO: 86 FL (ref 82–98)
MONOCYTES # BLD AUTO: 0.71 THOUSAND/ΜL (ref 0.17–1.22)
MONOCYTES NFR BLD AUTO: 6 % (ref 4–12)
NEUTROPHILS # BLD AUTO: 9.36 THOUSANDS/ΜL (ref 1.85–7.62)
NEUTS SEG NFR BLD AUTO: 73 % (ref 43–75)
NONHDLC SERPL-MCNC: 155 MG/DL
NRBC BLD AUTO-RTO: 0 /100 WBCS
PLATELET # BLD AUTO: 462 THOUSANDS/UL (ref 149–390)
PMV BLD AUTO: 10.3 FL (ref 8.9–12.7)
POTASSIUM SERPL-SCNC: 4 MMOL/L (ref 3.5–5.3)
PROT SERPL-MCNC: 7.2 G/DL (ref 6.4–8.2)
RBC # BLD AUTO: 4.41 MILLION/UL (ref 3.81–5.12)
SODIUM SERPL-SCNC: 136 MMOL/L (ref 136–145)
TRIGL SERPL-MCNC: 273 MG/DL
TSH SERPL DL<=0.05 MIU/L-ACNC: 2.49 UIU/ML (ref 0.36–3.74)
WBC # BLD AUTO: 12.61 THOUSAND/UL (ref 4.31–10.16)

## 2018-10-16 PROCEDURE — 36415 COLL VENOUS BLD VENIPUNCTURE: CPT

## 2018-10-16 PROCEDURE — 85025 COMPLETE CBC W/AUTO DIFF WBC: CPT

## 2018-10-16 PROCEDURE — 80053 COMPREHEN METABOLIC PANEL: CPT

## 2018-10-16 PROCEDURE — 84443 ASSAY THYROID STIM HORMONE: CPT

## 2018-10-16 PROCEDURE — 83036 HEMOGLOBIN GLYCOSYLATED A1C: CPT

## 2018-10-16 PROCEDURE — 80061 LIPID PANEL: CPT

## 2018-10-23 ENCOUNTER — OFFICE VISIT (OUTPATIENT)
Dept: INTERNAL MEDICINE CLINIC | Facility: CLINIC | Age: 58
End: 2018-10-23
Payer: COMMERCIAL

## 2018-10-23 VITALS
WEIGHT: 139 LBS | DIASTOLIC BLOOD PRESSURE: 92 MMHG | OXYGEN SATURATION: 97 % | SYSTOLIC BLOOD PRESSURE: 138 MMHG | HEIGHT: 64 IN | HEART RATE: 98 BPM | BODY MASS INDEX: 23.73 KG/M2

## 2018-10-23 DIAGNOSIS — K21.9 GERD WITHOUT ESOPHAGITIS: ICD-10-CM

## 2018-10-23 DIAGNOSIS — F17.210 CIGARETTE NICOTINE DEPENDENCE WITHOUT COMPLICATION: ICD-10-CM

## 2018-10-23 DIAGNOSIS — N18.30 CKD (CHRONIC KIDNEY DISEASE) STAGE 3, GFR 30-59 ML/MIN (HCC): ICD-10-CM

## 2018-10-23 DIAGNOSIS — E11.42 TYPE 2 DIABETES MELLITUS WITH DIABETIC POLYNEUROPATHY, WITHOUT LONG-TERM CURRENT USE OF INSULIN (HCC): Primary | ICD-10-CM

## 2018-10-23 DIAGNOSIS — Z23 NEED FOR INFLUENZA VACCINATION: ICD-10-CM

## 2018-10-23 DIAGNOSIS — I10 ESSENTIAL HYPERTENSION: ICD-10-CM

## 2018-10-23 DIAGNOSIS — E78.2 MIXED HYPERLIPIDEMIA: ICD-10-CM

## 2018-10-23 PROCEDURE — 3008F BODY MASS INDEX DOCD: CPT | Performed by: INTERNAL MEDICINE

## 2018-10-23 PROCEDURE — 90682 RIV4 VACC RECOMBINANT DNA IM: CPT

## 2018-10-23 PROCEDURE — 90471 IMMUNIZATION ADMIN: CPT

## 2018-10-23 PROCEDURE — 99214 OFFICE O/P EST MOD 30 MIN: CPT | Performed by: INTERNAL MEDICINE

## 2018-10-23 PROCEDURE — 4004F PT TOBACCO SCREEN RCVD TLK: CPT | Performed by: INTERNAL MEDICINE

## 2018-10-23 RX ORDER — PIOGLITAZONEHYDROCHLORIDE 45 MG/1
45 TABLET ORAL DAILY
Qty: 90 TABLET | Refills: 1 | Status: SHIPPED | OUTPATIENT
Start: 2018-10-23 | End: 2018-11-19 | Stop reason: SDUPTHER

## 2018-10-23 NOTE — PROGRESS NOTES
INTERNAL MEDICINE OFFICE VISIT  [facility] AT [address]  Tel: [phone]      NAME: Aroldo Lofton  AGE: 62 y o  SEX: female  : 1960   MRN: [id number]    DATE: 10/23/2018  TIME: 10:35 AM      Assessment and Plan:  1  Type 2 diabetes mellitus with diabetic polyneuropathy, without long-term current use of insulin ([facility])  Very poorly controlled  Will add metformin and Actos  I will repeat the labs in 4 months  She was told to cut down on the carbohydrate intake in her diet as much as possible  - metFORMIN (GLUCOPHAGE) 500 mg tablet; Take 1 tablet (500 mg total) by mouth 2 (two) times a day with meals  Dispense: 180 tablet; Refill: 1  - pioglitazone (ACTOS) 45 mg tablet; Take 1 tablet (45 mg total) by mouth daily  Dispense: 90 tablet; Refill: 1  - CBC and differential; Future  - TSH, 3rd generation; Future  - Hemoglobin A1C; Future  - Microalbumin / creatinine urine ratio; Future    2  Essential hypertension  Continue present medication    3  Mixed hyperlipidemia  Continue atorvastatin  - Lipid panel; Future    4  CKD (chronic kidney disease) stage 3, GFR 30-59 ml/min ([facility])  Follow-up creatinine levels  - Comprehensive metabolic panel; Future    5  GERD without esophagitis  Continue omeprazole    6  Cigarette nicotine dependence without complication  She was counseled to quit smoking      - Counseling Documentation: patient was counseled regarding: diagnostic results, instructions for management, risk factor reductions, prognosis, patient and family education, impressions, risks and benefits of treatment options and importance of compliance with treatment  - Medication Side Effects: Adverse side effects of medications were reviewed with the patient/guardian today  Return for follow up visit in 4 months or earlier, if needed        Chief Complaint:  Chief Complaint   Patient presents with    Follow-up     4 month History of Present Illness:   Type 2 diabetes-her previous hemoglobin A1c was 7 and now it is 11 8  She has been taking her medications but probably not following a low-carbohydrate diet at all  Hypertension-blood pressure stable on present medication  Hyperlipidemia-she takes atorvastatin regularly  Chronic kidney disease-the creatinine is also increasing and is now at 1 36  GERD-she takes omeprazole with relief of symptoms  Current smoker-she continues to smoke and does not want to quit        Active Problem List:  Patient Active Problem List   Diagnosis    Arthropathy    Carpal tunnel syndrome    Cigarette nicotine dependence without complication    Mixed hyperlipidemia    Essential hypertension    Tachycardia    Type 2 diabetes mellitus with diabetic polyneuropathy, without long-term current use of insulin ([facility])    [facility] with [facility]    CKD (chronic kidney disease) stage 3, GFR 30-59 ml/min ([facility])    Abnormal CBC    GERD without esophagitis    Chronic idiopathic constipation         Past Medical History:  Past Medical History:   Diagnosis Date    Anxiety     Benign hypertension     CTS (carpal tunnel syndrome)     Essential hypertriglyceridemia     Hyperlipidemia     last assessed 12/12/2017    Sebaceous cyst     Type 2 diabetes mellitus ([facility] )     last assessed 12/12/2017         Past Surgical History:  Past Surgical History:   Procedure Laterality Date    CHOLECYSTECTOMY LAPAROSCOPIC      KNEE SURGERY Right          Family History:  Family History   Problem Relation Age of Onset    Other Mother         acute subdural hematoma    Coronary artery disease Mother         CABG    Lung cancer Father     Gout Brother          Social History:  Social History     Social History    Marital status: /Civil Union     Spouse name: N/A    Number of children: N/A    Years of education: N/A     Social History Main Topics    Smoking status: Current Every Day Smoker     Last attempt to quit: 09/2013    Smokeless tobacco: Never Used    Alcohol use No    Drug use: No    Sexual activity: Yes     Partners: Male     Other Topics Concern    None     Social History Narrative    None         Allergies:   Allergies   Allergen Reactions    Wound Dressing Adhesive          Medications:    Current Outpatient Prescriptions:     aspirin 81 MG tablet, Take 1 tablet by mouth daily, Disp: , Rfl:     atorvastatin (LIPITOR) 20 mg tablet, Take 1 tablet (20 mg total) by mouth daily, Disp: 90 tablet, Rfl: 1    fenofibrate (TRICOR) 145 mg tablet, Take 1 tablet (145 mg total) by mouth daily, Disp: 90 tablet, Rfl: 1    gabapentin (NEURONTIN) 300 mg capsule, Take 1 capsule (300 mg total) by mouth daily at bedtime, Disp: 90 capsule, Rfl: 0    glipiZIDE (GLUCOTROL XL) 10 mg 24 hr tablet, Take 1 tablet (10 mg total) by mouth daily, Disp: 90 tablet, Rfl: 0    metoprolol succinate (TOPROL-XL) 25 mg 24 hr tablet, Take 1 tablet (25 mg total) by mouth daily, Disp: 90 tablet, Rfl: 0    omeprazole (PriLOSEC) 20 mg delayed release capsule, Take 1 capsule (20 mg total) by mouth daily, Disp: 90 capsule, Rfl: 1    sitaGLIPtin (JANUVIA) 100 mg tablet, Take 1 tablet (100 mg total) by mouth daily, Disp: 90 tablet, Rfl: 1    metFORMIN (GLUCOPHAGE) 500 mg tablet, Take 1 tablet (500 mg total) by mouth 2 (two) times a day with meals, Disp: 180 tablet, Rfl: 1    pioglitazone (ACTOS) 45 mg tablet, Take 1 tablet (45 mg total) by mouth daily, Disp: 90 tablet, Rfl: 1      The following portions of the patient's history were reviewed and updated as appropriate: past medical history, past surgical history, family history, social history, allergies, current medications and active problem list       Review of Systems:  Constitutional: Denies fever, chills, weight gain, weight loss, fatigue  Eyes: Denies eye redness, eye discharge, double vision, change in visual acuity  ENT: Denies hearing loss, tinnitus, sneezing, nasal congestion, nasal discharge, sore throat   Respiratory: Denies cough, expectoration, hemoptysis, shortness of breath, wheezing  Cardiovascular: Denies chest pain, palpitations, lower extremity swelling, orthopnea, PND  Gastrointestinal: Denies abdominal pain, heartburn, nausea, vomiting, hematemesis, diarrhea, bloody stools  Genito-Urinary: Denies dysuria, frequency, difficulty in micturition, nocturia, incontinence  Musculoskeletal: Denies back pain, joint pain, muscle pain  Neurologic: Denies confusion, lightheadedness, syncope, headache, focal weakness, sensory changes, seizures  Endocrine: Denies polyuria, polydipsia, temperature intolerance  Allergy and Immunology: Denies hives, insect bite sensitivity  Hematological and Lymphatic: Denies bleeding problems, swollen glands   Psychological: Denies depression, suicidal ideation, anxiety, panic, mood swings  Dermatological: Denies pruritus, rash, skin lesion changes      Vitals:  Vitals:    10/23/18 1015   BP: 138/92   Pulse: 98   SpO2: 97%       Body mass index is 24 24 kg/m²  Weight (last 2 days)     Date/Time   Weight    10/23/18 1015  63 (139)                Physical Examination:  General: Patient is not in acute distress  Awake, alert, responding to commands  No weight gain or loss  Head: Normocephalic  Atraumatic  Eyes: Conjunctiva and lids with no swelling, erythema or discharge  Both pupils normal sized, round and reactive to light  Sclera nonicteric  ENT: External examination of nose and ear normal  Otoscopic examination shows translucent tympanic membranes with patent canals without erythema  Oropharynx moist with no erythema, edema, exudate or lesions  Neck: Supple  JVP not raised  Trachea midline  No masses  No thyromegaly  Lungs: No signs of increased work of breathing or respiratory distress  Bilateral bronchovascular breath sounds with no crackles or rhonchi  Chest wall: No tenderness  Cardiovascular: Normal PMI   No thrills  Regular rate and rhythm  S1 and S2 normal  No murmur, rub or gallop  Gastrointestinal: Abdomen soft, nontender  No guarding or rigidity  Liver and spleen not palpable  Bowel sounds present  Neurologic: Cranial nerves II-XII intact   Cortical functions normal  Motor system - Reflexes 2+ and symmetrical  Sensations normal  Musculoskeletal: Gait normal  No joint tenderness  Integumentary: Skin normal with no rash or lesions  Lymphatic: No palpable lymph nodes in neck, axilla or groin  Extremities: No clubbing, cyanosis, edema or varicosities  Psychological: Judgement and insight normal  Mood and affect normal      Laboratory Results:  CBC with diff:   Lab Results   Component Value Date    WBC 12 61 (H) 10/16/2018    WBC 9 0 04/21/2015    RBC 4 41 10/16/2018    RBC 3 65 (L) 04/21/2015    HGB 12 3 10/16/2018    HGB 10 8 (L) 04/21/2015    HCT 38 0 10/16/2018    HCT 32 2 (L) 04/21/2015    MCV 86 10/16/2018    MCV 88 04/21/2015    MCH 27 9 10/16/2018    MCH 29 6 04/21/2015    RDW 12 1 10/16/2018    RDW 10 7 (L) 04/21/2015     (H) 10/16/2018     (H) 04/21/2015       CMP:  Lab Results   Component Value Date    CREATININE 1 36 (H) 10/16/2018    CREATININE 1 2 (H) 04/21/2015    BUN 32 (H) 10/16/2018    BUN 30 (H) 04/21/2015     10/16/2018     04/21/2015    K 4 0 10/16/2018    K 4 8 04/21/2015     10/16/2018     04/21/2015    CO2 22 10/16/2018    CO2 24 9 04/21/2015    GLUCOSE 293 (H) 04/21/2015    PROT 6 8 04/21/2015    ALKPHOS 63 10/16/2018    ALKPHOS 45 (L) 04/21/2015    ALT 17 10/16/2018    ALT 33 04/21/2015    AST 9 10/16/2018    AST 16 04/21/2015       Lab Results   Component Value Date    HGBA1C 11 8 (H) 10/16/2018    HGBA1C 11 2 (H) 04/21/2015       No results found for: TROPONINI, CKMB, CKTOTAL    Lipid Profile:   Lab Results   Component Value Date    CHOL 318 04/21/2015     Lab Results   Component Value Date    HDL 35 (L) 10/16/2018    HDL 36 (L) 06/08/2018     Lab Results Component Value Date    LDLCALC 100 10/16/2018    LDLCALC 71 06/08/2018     Lab Results   Component Value Date    TRIG 273 (H) 10/16/2018    TRIG 303 (H) 06/08/2018         Health Maintenance:  Health Maintenance   Topic Date Due    MAMMOGRAM  1960    CRC Screening: Colonoscopy  1960    DM Eye Exam  01/16/1970    Pneumococcal PPSV23 Medium Risk Adult (1 of 1 - PPSV23) 01/16/1979    DTaP,Tdap,and Td Vaccines (1 - Tdap) 01/16/1981    PAP SMEAR  01/16/1981    INFLUENZA VACCINE  07/01/2018    HEMOGLOBIN A1C  04/16/2019    URINE MICROALBUMIN  06/08/2019    Depression Screening PHQ  06/12/2019    Diabetic Foot Exam  06/12/2019       There is no immunization history on file for this patient        Ethan Cazares MD  10/23/2018,10:35 AM  Answers for HPI/ROS submitted by the patient on 10/22/2018   Diabetes problem  Diabetes type: type 2  MedicAlert ID: No  Disease duration: 6 years  blurred vision: No  chest pain: No  fatigue: Yes  foot paresthesias: No  foot ulcerations: No  polydipsia: No  polyphagia: No  polyuria: No  visual change: No  weakness: Yes  weight loss: No  Symptom course: improving  confusion: No  dizziness: No  headaches: No  hunger: No  mood changes: Yes  nervous/anxious: Yes  pallor: No  seizures: No  sleepiness: No  speech difficulty: No  sweats: No  tremors: No  blackouts: No  hospitalization: No  nocturnal hypoglycemia: No  required assistance: No  required glucagon: No  CVA: No  heart disease: No  impotence: No  nephropathy: Yes  peripheral neuropathy: Yes  PVD: No  retinopathy: No  CAD risks: dyslipidemia, family history, post-menopausal, stress, tobacco exposure  Current treatments: diet, oral agent (dual therapy)  Treatment compliance: all of the time  Home blood tests: 1-2 x per week  Monitoring compliance: poor  Blood glucose trend: increasing rapidly  breakfast time: 9-10 am  breakfast glucose level: >200  dinner time: 6-7 pm  High score: >200  Weight trend: increasing steadily  Current diet: diabetic, low fat/cholesterol  Meal planning: avoidance of concentrated sweets  Exercise: intermittently  Dietitian visit: No  Eye exam current: No  Sees podiatrist: No

## 2018-11-05 DIAGNOSIS — G63 POLYNEUROPATHY ASSOCIATED WITH UNDERLYING DISEASE (HCC): ICD-10-CM

## 2018-11-05 DIAGNOSIS — I10 ESSENTIAL HYPERTENSION: ICD-10-CM

## 2018-11-05 DIAGNOSIS — E78.2 MIXED HYPERLIPIDEMIA: ICD-10-CM

## 2018-11-05 DIAGNOSIS — E11.42 TYPE 2 DIABETES MELLITUS WITH DIABETIC POLYNEUROPATHY, WITHOUT LONG-TERM CURRENT USE OF INSULIN (HCC): ICD-10-CM

## 2018-11-05 DIAGNOSIS — K21.9 GERD WITHOUT ESOPHAGITIS: ICD-10-CM

## 2018-11-06 RX ORDER — GLIPIZIDE 10 MG/1
10 TABLET, FILM COATED, EXTENDED RELEASE ORAL DAILY
Qty: 90 TABLET | Refills: 3 | Status: SHIPPED | OUTPATIENT
Start: 2018-11-06 | End: 2019-11-21 | Stop reason: SDUPTHER

## 2018-11-06 RX ORDER — ATORVASTATIN CALCIUM 20 MG/1
20 TABLET, FILM COATED ORAL DAILY
Qty: 90 TABLET | Refills: 3 | Status: SHIPPED | OUTPATIENT
Start: 2018-11-06 | End: 2019-11-21 | Stop reason: SDUPTHER

## 2018-11-06 RX ORDER — FENOFIBRATE 145 MG/1
145 TABLET, COATED ORAL DAILY
Qty: 90 TABLET | Refills: 3 | Status: SHIPPED | OUTPATIENT
Start: 2018-11-06 | End: 2019-11-21 | Stop reason: SDUPTHER

## 2018-11-06 RX ORDER — GABAPENTIN 300 MG/1
300 CAPSULE ORAL
Qty: 90 CAPSULE | Refills: 3 | Status: SHIPPED | OUTPATIENT
Start: 2018-11-06 | End: 2019-03-05 | Stop reason: SDUPTHER

## 2018-11-06 RX ORDER — METOPROLOL SUCCINATE 25 MG/1
25 TABLET, EXTENDED RELEASE ORAL DAILY
Qty: 90 TABLET | Refills: 3 | Status: SHIPPED | OUTPATIENT
Start: 2018-11-06 | End: 2019-07-22 | Stop reason: SDUPTHER

## 2018-11-06 RX ORDER — OMEPRAZOLE 20 MG/1
20 CAPSULE, DELAYED RELEASE ORAL DAILY
Qty: 90 CAPSULE | Refills: 3 | Status: SHIPPED | OUTPATIENT
Start: 2018-11-06 | End: 2019-11-21 | Stop reason: SDUPTHER

## 2018-11-06 NOTE — TELEPHONE ENCOUNTER
From: Fredi Taylor  Sent: 11/5/2018 4:49 PM EST  Subject: Medication Renewal Request    Versa Lucie Torrez would like a refill of the following medications:     omeprazole (PriLOSEC) 20 mg delayed release capsule Edwar Sands MD]     gabapentin (NEURONTIN) 300 mg capsule Edwar Sands MD]     atorvastatin (LIPITOR) 20 mg tablet Edwar Sands MD]     fenofibrate (TRICOR) 145 mg tablet Edwar Sands MD]     metoprolol succinate (TOPROL-XL) 25 mg 24 hr tablet Edwar Sands MD]     sitaGLIPtin (JANUVIA) 100 mg tablet Edwar Sands MD]     glipiZIDE (GLUCOTROL XL) 10 mg 24 hr tablet Edwar Sands MD]    [id number]

## 2018-11-19 DIAGNOSIS — E11.42 TYPE 2 DIABETES MELLITUS WITH DIABETIC POLYNEUROPATHY, WITHOUT LONG-TERM CURRENT USE OF INSULIN (HCC): Primary | ICD-10-CM

## 2018-11-19 RX ORDER — PIOGLITAZONEHYDROCHLORIDE 45 MG/1
45 TABLET ORAL DAILY
Qty: 90 TABLET | Refills: 0 | Status: SHIPPED | OUTPATIENT
Start: 2018-11-19 | End: 2019-07-22

## 2019-02-28 ENCOUNTER — APPOINTMENT (OUTPATIENT)
Dept: LAB | Facility: CLINIC | Age: 59
End: 2019-02-28
Payer: COMMERCIAL

## 2019-02-28 DIAGNOSIS — E11.42 TYPE 2 DIABETES MELLITUS WITH DIABETIC POLYNEUROPATHY, WITHOUT LONG-TERM CURRENT USE OF INSULIN (HCC): ICD-10-CM

## 2019-02-28 DIAGNOSIS — E78.2 MIXED HYPERLIPIDEMIA: ICD-10-CM

## 2019-02-28 DIAGNOSIS — N18.30 CKD (CHRONIC KIDNEY DISEASE) STAGE 3, GFR 30-59 ML/MIN (HCC): ICD-10-CM

## 2019-02-28 LAB
ALBUMIN SERPL BCP-MCNC: 3.3 G/DL (ref 3.5–5)
ALP SERPL-CCNC: 63 U/L (ref 46–116)
ALT SERPL W P-5'-P-CCNC: 13 U/L (ref 12–78)
ANION GAP SERPL CALCULATED.3IONS-SCNC: 7 MMOL/L (ref 4–13)
AST SERPL W P-5'-P-CCNC: 11 U/L (ref 5–45)
BASOPHILS # BLD AUTO: 0.07 THOUSANDS/ΜL (ref 0–0.1)
BASOPHILS NFR BLD AUTO: 1 % (ref 0–1)
BILIRUB SERPL-MCNC: 0.23 MG/DL (ref 0.2–1)
BUN SERPL-MCNC: 25 MG/DL (ref 5–25)
CALCIUM SERPL-MCNC: 8.7 MG/DL (ref 8.3–10.1)
CHLORIDE SERPL-SCNC: 108 MMOL/L (ref 100–108)
CHOLEST SERPL-MCNC: 166 MG/DL (ref 50–200)
CO2 SERPL-SCNC: 24 MMOL/L (ref 21–32)
CREAT SERPL-MCNC: 1.2 MG/DL (ref 0.6–1.3)
CREAT UR-MCNC: 48.2 MG/DL
EOSINOPHIL # BLD AUTO: 0.2 THOUSAND/ΜL (ref 0–0.61)
EOSINOPHIL NFR BLD AUTO: 2 % (ref 0–6)
ERYTHROCYTE [DISTWIDTH] IN BLOOD BY AUTOMATED COUNT: 13.9 % (ref 11.6–15.1)
EST. AVERAGE GLUCOSE BLD GHB EST-MCNC: 148 MG/DL
GFR SERPL CREATININE-BSD FRML MDRD: 50 ML/MIN/1.73SQ M
GLUCOSE P FAST SERPL-MCNC: 68 MG/DL (ref 65–99)
HBA1C MFR BLD: 6.8 % (ref 4.2–6.3)
HCT VFR BLD AUTO: 29.5 % (ref 34.8–46.1)
HDLC SERPL-MCNC: 44 MG/DL (ref 40–60)
HGB BLD-MCNC: 9.1 G/DL (ref 11.5–15.4)
IMM GRANULOCYTES # BLD AUTO: 0.14 THOUSAND/UL (ref 0–0.2)
IMM GRANULOCYTES NFR BLD AUTO: 1 % (ref 0–2)
LDLC SERPL CALC-MCNC: 90 MG/DL (ref 0–100)
LYMPHOCYTES # BLD AUTO: 2.26 THOUSANDS/ΜL (ref 0.6–4.47)
LYMPHOCYTES NFR BLD AUTO: 19 % (ref 14–44)
MCH RBC QN AUTO: 29.1 PG (ref 26.8–34.3)
MCHC RBC AUTO-ENTMCNC: 30.8 G/DL (ref 31.4–37.4)
MCV RBC AUTO: 94 FL (ref 82–98)
MICROALBUMIN UR-MCNC: 1290 MG/L (ref 0–20)
MICROALBUMIN/CREAT 24H UR: 2676 MG/G CREATININE (ref 0–30)
MONOCYTES # BLD AUTO: 0.85 THOUSAND/ΜL (ref 0.17–1.22)
MONOCYTES NFR BLD AUTO: 7 % (ref 4–12)
NEUTROPHILS # BLD AUTO: 8.16 THOUSANDS/ΜL (ref 1.85–7.62)
NEUTS SEG NFR BLD AUTO: 70 % (ref 43–75)
NONHDLC SERPL-MCNC: 122 MG/DL
NRBC BLD AUTO-RTO: 0 /100 WBCS
PLATELET # BLD AUTO: 539 THOUSANDS/UL (ref 149–390)
PMV BLD AUTO: 9.8 FL (ref 8.9–12.7)
POTASSIUM SERPL-SCNC: 3.8 MMOL/L (ref 3.5–5.3)
PROT SERPL-MCNC: 7.6 G/DL (ref 6.4–8.2)
RBC # BLD AUTO: 3.13 MILLION/UL (ref 3.81–5.12)
SODIUM SERPL-SCNC: 139 MMOL/L (ref 136–145)
TRIGL SERPL-MCNC: 158 MG/DL
TSH SERPL DL<=0.05 MIU/L-ACNC: 2.59 UIU/ML (ref 0.36–3.74)
WBC # BLD AUTO: 11.68 THOUSAND/UL (ref 4.31–10.16)

## 2019-02-28 PROCEDURE — 80061 LIPID PANEL: CPT

## 2019-02-28 PROCEDURE — 82570 ASSAY OF URINE CREATININE: CPT

## 2019-02-28 PROCEDURE — 82043 UR ALBUMIN QUANTITATIVE: CPT

## 2019-02-28 PROCEDURE — 84443 ASSAY THYROID STIM HORMONE: CPT

## 2019-02-28 PROCEDURE — 85025 COMPLETE CBC W/AUTO DIFF WBC: CPT

## 2019-02-28 PROCEDURE — 80053 COMPREHEN METABOLIC PANEL: CPT

## 2019-02-28 PROCEDURE — 36415 COLL VENOUS BLD VENIPUNCTURE: CPT

## 2019-02-28 PROCEDURE — 83036 HEMOGLOBIN GLYCOSYLATED A1C: CPT

## 2019-02-28 PROCEDURE — 3062F POS MACROALBUMINURIA REV: CPT | Performed by: INTERNAL MEDICINE

## 2019-03-05 ENCOUNTER — OFFICE VISIT (OUTPATIENT)
Dept: INTERNAL MEDICINE CLINIC | Facility: CLINIC | Age: 59
End: 2019-03-05
Payer: COMMERCIAL

## 2019-03-05 VITALS
WEIGHT: 154 LBS | BODY MASS INDEX: 26.29 KG/M2 | HEART RATE: 86 BPM | HEIGHT: 64 IN | OXYGEN SATURATION: 99 % | SYSTOLIC BLOOD PRESSURE: 130 MMHG | DIASTOLIC BLOOD PRESSURE: 76 MMHG

## 2019-03-05 DIAGNOSIS — I10 ESSENTIAL HYPERTENSION: ICD-10-CM

## 2019-03-05 DIAGNOSIS — E66.3 OVERWEIGHT: ICD-10-CM

## 2019-03-05 DIAGNOSIS — E11.42 TYPE 2 DIABETES MELLITUS WITH DIABETIC POLYNEUROPATHY, WITHOUT LONG-TERM CURRENT USE OF INSULIN (HCC): Primary | ICD-10-CM

## 2019-03-05 DIAGNOSIS — N18.30 CKD (CHRONIC KIDNEY DISEASE) STAGE 3, GFR 30-59 ML/MIN (HCC): ICD-10-CM

## 2019-03-05 DIAGNOSIS — G63 POLYNEUROPATHY ASSOCIATED WITH UNDERLYING DISEASE (HCC): ICD-10-CM

## 2019-03-05 DIAGNOSIS — K21.9 GERD WITHOUT ESOPHAGITIS: ICD-10-CM

## 2019-03-05 DIAGNOSIS — E78.2 MIXED HYPERLIPIDEMIA: ICD-10-CM

## 2019-03-05 PROBLEM — F17.210 CIGARETTE NICOTINE DEPENDENCE WITHOUT COMPLICATION: Status: RESOLVED | Noted: 2017-12-12 | Resolved: 2019-03-05

## 2019-03-05 PROCEDURE — 1036F TOBACCO NON-USER: CPT | Performed by: INTERNAL MEDICINE

## 2019-03-05 PROCEDURE — 3008F BODY MASS INDEX DOCD: CPT | Performed by: INTERNAL MEDICINE

## 2019-03-05 PROCEDURE — 3078F DIAST BP <80 MM HG: CPT | Performed by: INTERNAL MEDICINE

## 2019-03-05 PROCEDURE — 3075F SYST BP GE 130 - 139MM HG: CPT | Performed by: INTERNAL MEDICINE

## 2019-03-05 PROCEDURE — 99214 OFFICE O/P EST MOD 30 MIN: CPT | Performed by: INTERNAL MEDICINE

## 2019-03-05 RX ORDER — GABAPENTIN 300 MG/1
300 CAPSULE ORAL 2 TIMES DAILY
Qty: 180 CAPSULE | Refills: 3 | Status: SHIPPED | OUTPATIENT
Start: 2019-03-05 | End: 2019-07-22

## 2019-03-05 NOTE — PROGRESS NOTES
INTERNAL MEDICINE OFFICE VISIT  [facility] AT [address], [address] [place] [address]  Tel: [phone]      NAME: Sarah Bahena  AGE: 61 y o  SEX: female  : 1960   MRN: [id number]    DATE: 3/5/2019  TIME: 10:01 AM      Assessment and Plan:  1  Type 2 diabetes mellitus with diabetic polyneuropathy, without long-term current use of insulin ([facility] )  She was told to continue the same medications  She was told to take the metformin only once a day  I will repeat labs in 4 months     - CBC and differential; Future  - Comprehensive metabolic panel; Future  - TSH, 3rd generation; Future  - Hemoglobin A1C; Future    2  Essential hypertension  Continue present medications    3  Mixed hyperlipidemia  Continue atorvastatin  - Lipid panel; Future    4  Polyneuropathy associated with underlying disease ([email or web address] )  Gabapentin was increased to twice a day as she still has symptoms  - gabapentin (NEURONTIN) 300 mg capsule; Take 1 capsule (300 mg total) by mouth 2 (two) times a day  Dispense: 180 capsule; Refill: 3    5  CKD (chronic kidney disease) stage 3, GFR 30-59 ml/min ([facility])  Follow-up creatinine    6  GERD without esophagitis  Continue omeprazole    7  Overweight  BMI Counseling: Body mass index is 26 85 kg/m²  Discussed the patient's BMI with her  The BMI is above average  BMI counseling and education was provided to the patient  Nutrition recommendations include reducing portion sizes, decreasing overall calorie intake and moderation in carbohydrate intake  - Counseling Documentation: patient was counseled regarding: diagnostic results, instructions for management, risk factor reductions, prognosis, patient and family education, impressions, risks and benefits of treatment options and importance of compliance with treatment  - Medication Side Effects: Adverse side effects of medications were reviewed with the patient/guardian today        Return for follow up Patient: Ammon Melton    Procedure Summary     Date Anesthesia Start Anesthesia Stop Room / Location    04/05/17 [id number]  [facility] OR 01 / [facility] OR       Procedure Diagnosis Surgeon Provider    anal fistulotomy (N/A Perirectal) Perirectal abscess  (Perirectal abscess [K61.1]) MD Clay Boggs DO          Anesthesia Type: general  Last vitals  /71 (04/05/17 1750)    Temp 97.6 °F (36.4 °C) (04/05/17 1750)    Pulse 84 (04/05/17 1750)   Resp 13 (04/05/17 1750)    SpO2 99 % (04/05/17 1750)      Post Anesthesia Care and Evaluation    Patient location during evaluation: PHASE II  Patient participation: complete - patient participated  Level of consciousness: awake and alert  Pain score: 1  Pain management: adequate  Airway patency: patent  Anesthetic complications: No anesthetic complications  PONV Status: controlled  Cardiovascular status: acceptable  Respiratory status: acceptable  Hydration status: acceptable       visit in 4 months or earlier, if needed  Chief Complaint:  Chief Complaint   Patient presents with    Well Check     4 month         History of Present Illness:   Type 2 diabetes-very well controlled this time with a hemoglobin A1c of 6 8  She cut down the metformin to once a day as she was having hypoglycemic episodes  Hypertension-blood pressure stable on present medication  Hyperlipidemia-she has been taking atorvastatin regularly  Neuropathy-she is taking gabapentin but still has symptoms  Chronic kidney disease-creatinine is stable  GERD-she is taking omeprazole with relief of symptoms  Overweight-she has been eating a lot since she quit smoking and has gained weight about 15 lb in the last 4 months        Active Problem List:  Patient Active Problem List   Diagnosis    Arthropathy    Carpal tunnel syndrome    Mixed hyperlipidemia    Essential hypertension    Tachycardia    Type 2 diabetes mellitus with diabetic polyneuropathy, without long-term current use of insulin (HCC)    Polyneuropathy associated with underlying disease ([facility]    CKD (chronic kidney disease) stage 3, GFR 30-59 ml/min ([facility])    Abnormal CBC    GERD without esophagitis    Chronic idiopathic constipation    Overweight         Past Medical History:  Past Medical History:   Diagnosis Date    Anxiety     Benign hypertension     Cigarette nicotine dependence without complication 54/67/6312    CTS (carpal tunnel syndrome)     Essential hypertriglyceridemia     Hyperlipidemia     last assessed 12/12/2017    Sebaceous cyst     Type 2 diabetes mellitus ([facility] )     last assessed 12/12/2017         Past Surgical History:  Past Surgical History:   Procedure Laterality Date    CHOLECYSTECTOMY LAPAROSCOPIC      KNEE SURGERY Right          Family History:  Family History   Problem Relation Age of Onset    Other Mother         acute subdural hematoma    Coronary artery disease Mother         CABG    Lung cancer Father     Gout Brother          Social History:  Social History     Socioeconomic History    Marital status: /Civil Union     Spouse name: None    Number of children: None    Years of education: None    Highest education level: None   Occupational History    None   Social Needs    Financial resource strain: None    Food insecurity:     Worry: None     Inability: None    Transportation needs:     Medical: None     Non-medical: None   Tobacco Use    Smoking status: Former Smoker     Last attempt to quit: 2019     Years since quittin 1    Smokeless tobacco: Never Used   Substance and Sexual Activity    Alcohol use: No    Drug use: No    Sexual activity: Yes     Partners: Male   Lifestyle    Physical activity:     Days per week: None     Minutes per session: None    Stress: None   Relationships    Social connections:     Talks on phone: None     Gets together: None     Attends Yarsani service: None     Active member of club or organization: None     Attends meetings of clubs or organizations: None     Relationship status: None    Intimate partner violence:     Fear of current or ex partner: None     Emotionally abused: None     Physically abused: None     Forced sexual activity: None   Other Topics Concern    None   Social History Narrative    None         Allergies:   Allergies   Allergen Reactions    Wound Dressing Adhesive          Medications:    Current Outpatient Medications:     aspirin 81 MG tablet, Take 1 tablet by mouth daily, Disp: , Rfl:     atorvastatin (LIPITOR) 20 mg tablet, Take 1 tablet (20 mg total) by mouth daily, Disp: 90 tablet, Rfl: 3    fenofibrate (TRICOR) 145 mg tablet, Take 1 tablet (145 mg total) by mouth daily, Disp: 90 tablet, Rfl: 3    gabapentin (NEURONTIN) 300 mg capsule, Take 1 capsule (300 mg total) by mouth 2 (two) times a day, Disp: 180 capsule, Rfl: 3    glipiZIDE (GLUCOTROL XL) 10 mg 24 hr tablet, Take 1 tablet (10 mg total) by mouth daily, Disp: 90 tablet, Rfl: 3    metFORMIN (GLUCOPHAGE) 500 mg tablet, Take 1 tablet (500 mg total) by mouth 2 (two) times a day with meals (Patient taking differently: Take 500 mg by mouth daily with breakfast ), Disp: 180 tablet, Rfl: 0    metoprolol succinate (TOPROL-XL) 25 mg 24 hr tablet, Take 1 tablet (25 mg total) by mouth daily, Disp: 90 tablet, Rfl: 3    omeprazole (PriLOSEC) 20 mg delayed release capsule, Take 1 capsule (20 mg total) by mouth daily, Disp: 90 capsule, Rfl: 3    pioglitazone (ACTOS) 45 mg tablet, Take 1 tablet (45 mg total) by mouth daily, Disp: 90 tablet, Rfl: 0    sitaGLIPtin (JANUVIA) 100 mg tablet, Take 1 tablet (100 mg total) by mouth daily, Disp: 90 tablet, Rfl: 3      The following portions of the patient's history were reviewed and updated as appropriate: past medical history, past surgical history, family history, social history, allergies, current medications and active problem list       Review of Systems:  Constitutional: Denies fever, chills, weight gain, weight loss, fatigue  Eyes: Denies eye redness, eye discharge, double vision, change in visual acuity  ENT: Denies hearing loss, tinnitus, sneezing, nasal congestion, nasal discharge, sore throat   Respiratory: Denies cough, expectoration, hemoptysis, shortness of breath, wheezing  Cardiovascular: Denies chest pain, palpitations, lower extremity swelling, orthopnea, PND  Gastrointestinal: Denies abdominal pain, heartburn, nausea, vomiting, hematemesis, diarrhea, bloody stools  Genito-Urinary: Denies dysuria, frequency, difficulty in micturition, nocturia, incontinence  Musculoskeletal: Denies back pain, joint pain, muscle pain  Neurologic: Denies confusion, lightheadedness, syncope, headache, focal weakness, sensory changes, seizures  Endocrine: Denies polyuria, polydipsia, temperature intolerance  Allergy and Immunology: Denies hives, insect bite sensitivity  Hematological and Lymphatic: Denies bleeding problems, swollen glands Psychological: Denies depression, suicidal ideation, anxiety, panic, mood swings  Dermatological: Denies pruritus, rash, skin lesion changes      Vitals:  Vitals:    03/05/19 0935   BP: 130/76   Pulse: 86   SpO2: 99%       Body mass index is 26 85 kg/m²  Weight (last 2 days)     Date/Time   Weight    03/05/19 0935   69 9 (154)                Physical Examination:  General: Patient is not in acute distress  Awake, alert, responding to commands  No weight gain or loss  Head: Normocephalic  Atraumatic  Eyes: Conjunctiva and lids with no swelling, erythema or discharge  Both pupils normal sized, round and reactive to light  Sclera nonicteric  ENT: External examination of nose and ear normal  Otoscopic examination shows translucent tympanic membranes with patent canals without erythema  Oropharynx moist with no erythema, edema, exudate or lesions  Neck: Supple  JVP not raised  Trachea midline  No masses  No thyromegaly  Lungs: No signs of increased work of breathing or respiratory distress  Bilateral bronchovascular breath sounds with no crackles or rhonchi  Chest wall: No tenderness  Cardiovascular: Normal PMI  No thrills  Regular rate and rhythm  S1 and S2 normal  No murmur, rub or gallop  Gastrointestinal: Abdomen soft, nontender  No guarding or rigidity  Liver and spleen not palpable  Bowel sounds present  Neurologic: Cranial nerves II-XII intact   Cortical functions normal  Motor system - Reflexes 2+ and symmetrical  Sensations normal  Musculoskeletal: Gait normal  No joint tenderness  Integumentary: Skin normal with no rash or lesions  Lymphatic: No palpable lymph nodes in neck, axilla or groin  Extremities: No clubbing, cyanosis, edema or varicosities  Psychological: Judgement and insight normal  Mood and affect normal      Laboratory Results:  CBC with diff:   Lab Results   Component Value Date    WBC 11 68 (H) 02/28/2019    WBC 9 0 04/21/2015    RBC 3 13 (L) 02/28/2019    RBC 3 65 (L) 04/21/2015    HGB 9 1 (L) 02/28/2019    HGB 10 8 (L) 04/21/2015    HCT 29 5 (L) 02/28/2019    HCT 32 2 (L) 04/21/2015    MCV 94 02/28/2019    MCV 88 04/21/2015    MCH 29 1 02/28/2019    MCH 29 6 04/21/2015    RDW 13 9 02/28/2019    RDW 10 7 (L) 04/21/2015     (H) 02/28/2019     (H) 04/21/2015       CMP:  Lab Results   Component Value Date    CREATININE 1 20 02/28/2019    CREATININE 1 2 (H) 04/21/2015    BUN 25 02/28/2019    BUN 30 (H) 04/21/2015     04/21/2015    K 3 8 02/28/2019    K 4 8 04/21/2015     02/28/2019     04/21/2015    CO2 24 02/28/2019    CO2 24 9 04/21/2015    GLUCOSE 293 (H) 04/21/2015    PROT 6 8 04/21/2015    ALKPHOS 63 02/28/2019    ALKPHOS 45 (L) 04/21/2015    ALT 13 02/28/2019    ALT 33 04/21/2015    AST 11 02/28/2019    AST 16 04/21/2015       Lab Results   Component Value Date    HGBA1C 6 8 (H) 02/28/2019    HGBA1C 11 2 (H) 04/21/2015       No results found for: TROPONINI, CKMB, CKTOTAL    Lipid Profile:   Lab Results   Component Value Date    CHOL 318 04/21/2015     Lab Results   Component Value Date    HDL 44 02/28/2019    HDL 35 (L) 10/16/2018     Lab Results   Component Value Date    LDLCALC 90 02/28/2019    LDLCALC 100 10/16/2018     Lab Results   Component Value Date    TRIG 158 (H) 02/28/2019    TRIG 273 (H) 10/16/2018         Health Maintenance:  Health Maintenance   Topic Date Due    Hepatitis C Screening  1960    MAMMOGRAM  1960    CRC Screening: Colonoscopy  1960    DM Eye Exam  01/16/1970    BMI: Followup Plan  01/16/1978    HEPATITIS B VACCINES (1 of 3 - Risk 3-dose series) 01/16/1979    Pneumococcal PPSV23 Medium Risk Adult (1 of 1 - PPSV23) 01/16/1979    DTaP,Tdap,and Td Vaccines (1 - Tdap) 01/16/1981    PAP SMEAR  01/16/1981    Depression Screening PHQ  06/12/2019    Diabetic Foot Exam  06/12/2019    HEMOGLOBIN A1C  08/28/2019    URINE MICROALBUMIN  02/28/2020    BMI: Adult  03/05/2020    INFLUENZA VACCINE  Completed     Immunization History   Administered Date(s) Administered    Influenza, recombinant, quadrivalent,injectable, preservative free 10/23/2018         Yolanda Vázquez MD  3/5/2019,10:01 AM  Answers for HPI/ROS submitted by the patient on 3/3/2019   Diabetes problem  Diabetes type: type 2  MedicAlert ID: No  Disease duration: 8 years  blurred vision: No  chest pain: No  foot paresthesias: No  foot ulcerations: No  polyphagia: No  polyuria: No  weakness: No  weight loss: No  Symptom course: stable  confusion: No  dizziness: No  headaches: No  mood changes: No  pallor: No  seizures: No  speech difficulty: No  sweats: No  blackouts: No  hospitalization: No  nocturnal hypoglycemia: Yes  required assistance: Yes  required glucagon: Yes  CVA: No  heart disease: No  nephropathy: Yes  peripheral neuropathy: Yes  retinopathy: No  CAD risks: dyslipidemia, family history, post-menopausal  Current treatments: diet, oral agent (triple therapy)  Treatment compliance: most of the time  Given by: adult caretaker  Home blood tests: 3-4 x per week  Monitoring compliance: poor  Blood glucose trend: decreasing steadily  breakfast time: after 10 am  breakfast glucose level: 110-130  lunch time: 11-12 pm  lunch glucose level: 130-140  dinner time: 5-6 pm  dinner glucose level: 110-130  High score: 140-180  Overall: 130-140  Weight trend: increasing steadily  Current diet: diabetic, low fat/cholesterol  Meal planning: none  Exercise: intermittently  Dietitian visit: No  Eye exam current: No  Sees podiatrist: No

## 2019-04-19 ENCOUNTER — APPOINTMENT (OUTPATIENT)
Dept: LAB | Facility: CLINIC | Age: 59
End: 2019-04-19
Payer: COMMERCIAL

## 2019-04-19 ENCOUNTER — TRANSCRIBE ORDERS (OUTPATIENT)
Dept: LAB | Facility: CLINIC | Age: 59
End: 2019-04-19

## 2019-04-19 DIAGNOSIS — E11.22 CKD STAGE 3 SECONDARY TO DIABETES (HCC): Primary | ICD-10-CM

## 2019-04-19 DIAGNOSIS — N18.30 CKD STAGE 3 SECONDARY TO DIABETES (HCC): ICD-10-CM

## 2019-04-19 DIAGNOSIS — N18.30 CKD STAGE 3 SECONDARY TO DIABETES (HCC): Primary | ICD-10-CM

## 2019-04-19 DIAGNOSIS — E11.22 CKD STAGE 3 SECONDARY TO DIABETES (HCC): ICD-10-CM

## 2019-04-19 LAB
ANION GAP SERPL CALCULATED.3IONS-SCNC: 3 MMOL/L (ref 4–13)
BUN SERPL-MCNC: 51 MG/DL (ref 5–25)
CALCIUM SERPL-MCNC: 8.5 MG/DL (ref 8.3–10.1)
CHLORIDE SERPL-SCNC: 105 MMOL/L (ref 100–108)
CO2 SERPL-SCNC: 29 MMOL/L (ref 21–32)
CREAT SERPL-MCNC: 1.77 MG/DL (ref 0.6–1.3)
GFR SERPL CREATININE-BSD FRML MDRD: 31 ML/MIN/1.73SQ M
GLUCOSE P FAST SERPL-MCNC: 131 MG/DL (ref 65–99)
POTASSIUM SERPL-SCNC: 3.5 MMOL/L (ref 3.5–5.3)
SODIUM SERPL-SCNC: 137 MMOL/L (ref 136–145)

## 2019-04-19 PROCEDURE — 80048 BASIC METABOLIC PNL TOTAL CA: CPT

## 2019-04-19 PROCEDURE — 36415 COLL VENOUS BLD VENIPUNCTURE: CPT

## 2019-07-17 ENCOUNTER — APPOINTMENT (OUTPATIENT)
Dept: LAB | Facility: CLINIC | Age: 59
End: 2019-07-17
Payer: COMMERCIAL

## 2019-07-17 DIAGNOSIS — E78.2 MIXED HYPERLIPIDEMIA: ICD-10-CM

## 2019-07-17 DIAGNOSIS — E11.42 TYPE 2 DIABETES MELLITUS WITH DIABETIC POLYNEUROPATHY, WITHOUT LONG-TERM CURRENT USE OF INSULIN (HCC): ICD-10-CM

## 2019-07-17 LAB
ALBUMIN SERPL BCP-MCNC: 3.6 G/DL (ref 3.5–5)
ALP SERPL-CCNC: 51 U/L (ref 46–116)
ALT SERPL W P-5'-P-CCNC: 18 U/L (ref 12–78)
ANION GAP SERPL CALCULATED.3IONS-SCNC: 8 MMOL/L (ref 4–13)
AST SERPL W P-5'-P-CCNC: 18 U/L (ref 5–45)
BASOPHILS # BLD AUTO: 0.04 THOUSANDS/ΜL (ref 0–0.1)
BASOPHILS NFR BLD AUTO: 0 % (ref 0–1)
BILIRUB SERPL-MCNC: 0.4 MG/DL (ref 0.2–1)
BUN SERPL-MCNC: 60 MG/DL (ref 5–25)
CALCIUM SERPL-MCNC: 9.1 MG/DL (ref 8.3–10.1)
CHLORIDE SERPL-SCNC: 102 MMOL/L (ref 100–108)
CHOLEST SERPL-MCNC: 168 MG/DL (ref 50–200)
CO2 SERPL-SCNC: 26 MMOL/L (ref 21–32)
CREAT SERPL-MCNC: 2.69 MG/DL (ref 0.6–1.3)
EOSINOPHIL # BLD AUTO: 1.72 THOUSAND/ΜL (ref 0–0.61)
EOSINOPHIL NFR BLD AUTO: 16 % (ref 0–6)
ERYTHROCYTE [DISTWIDTH] IN BLOOD BY AUTOMATED COUNT: 15.7 % (ref 11.6–15.1)
EST. AVERAGE GLUCOSE BLD GHB EST-MCNC: 169 MG/DL
GFR SERPL CREATININE-BSD FRML MDRD: 19 ML/MIN/1.73SQ M
GLUCOSE P FAST SERPL-MCNC: 126 MG/DL (ref 65–99)
HBA1C MFR BLD: 7.5 % (ref 4.2–6.3)
HCT VFR BLD AUTO: 31.7 % (ref 34.8–46.1)
HDLC SERPL-MCNC: 39 MG/DL (ref 40–60)
HGB BLD-MCNC: 9.9 G/DL (ref 11.5–15.4)
IMM GRANULOCYTES # BLD AUTO: 0.06 THOUSAND/UL (ref 0–0.2)
IMM GRANULOCYTES NFR BLD AUTO: 1 % (ref 0–2)
LDLC SERPL CALC-MCNC: 86 MG/DL (ref 0–100)
LYMPHOCYTES # BLD AUTO: 2.06 THOUSANDS/ΜL (ref 0.6–4.47)
LYMPHOCYTES NFR BLD AUTO: 19 % (ref 14–44)
MCH RBC QN AUTO: 27 PG (ref 26.8–34.3)
MCHC RBC AUTO-ENTMCNC: 31.2 G/DL (ref 31.4–37.4)
MCV RBC AUTO: 87 FL (ref 82–98)
MONOCYTES # BLD AUTO: 0.59 THOUSAND/ΜL (ref 0.17–1.22)
MONOCYTES NFR BLD AUTO: 6 % (ref 4–12)
NEUTROPHILS # BLD AUTO: 6.35 THOUSANDS/ΜL (ref 1.85–7.62)
NEUTS SEG NFR BLD AUTO: 58 % (ref 43–75)
NONHDLC SERPL-MCNC: 129 MG/DL
NRBC BLD AUTO-RTO: 0 /100 WBCS
PLATELET # BLD AUTO: 468 THOUSANDS/UL (ref 149–390)
PMV BLD AUTO: 10.3 FL (ref 8.9–12.7)
POTASSIUM SERPL-SCNC: 4.1 MMOL/L (ref 3.5–5.3)
PROT SERPL-MCNC: 7.6 G/DL (ref 6.4–8.2)
RBC # BLD AUTO: 3.66 MILLION/UL (ref 3.81–5.12)
SODIUM SERPL-SCNC: 136 MMOL/L (ref 136–145)
TRIGL SERPL-MCNC: 213 MG/DL
TSH SERPL DL<=0.05 MIU/L-ACNC: 3.12 UIU/ML (ref 0.36–3.74)
WBC # BLD AUTO: 10.82 THOUSAND/UL (ref 4.31–10.16)

## 2019-07-17 PROCEDURE — 80061 LIPID PANEL: CPT

## 2019-07-17 PROCEDURE — 84443 ASSAY THYROID STIM HORMONE: CPT

## 2019-07-17 PROCEDURE — 83036 HEMOGLOBIN GLYCOSYLATED A1C: CPT

## 2019-07-17 PROCEDURE — 80053 COMPREHEN METABOLIC PANEL: CPT

## 2019-07-17 PROCEDURE — 85025 COMPLETE CBC W/AUTO DIFF WBC: CPT

## 2019-07-17 PROCEDURE — 36415 COLL VENOUS BLD VENIPUNCTURE: CPT

## 2019-07-22 ENCOUNTER — OFFICE VISIT (OUTPATIENT)
Dept: INTERNAL MEDICINE CLINIC | Facility: CLINIC | Age: 59
End: 2019-07-22
Payer: COMMERCIAL

## 2019-07-22 VITALS
DIASTOLIC BLOOD PRESSURE: 84 MMHG | SYSTOLIC BLOOD PRESSURE: 140 MMHG | OXYGEN SATURATION: 98 % | BODY MASS INDEX: 26.33 KG/M2 | WEIGHT: 151 LBS | HEART RATE: 122 BPM

## 2019-07-22 DIAGNOSIS — K21.9 GERD WITHOUT ESOPHAGITIS: ICD-10-CM

## 2019-07-22 DIAGNOSIS — N18.4 CKD (CHRONIC KIDNEY DISEASE) STAGE 4, GFR 15-29 ML/MIN (HCC): ICD-10-CM

## 2019-07-22 DIAGNOSIS — Z12.39 SCREENING FOR BREAST CANCER: ICD-10-CM

## 2019-07-22 DIAGNOSIS — E78.2 MIXED HYPERLIPIDEMIA: ICD-10-CM

## 2019-07-22 DIAGNOSIS — E11.42 TYPE 2 DIABETES MELLITUS WITH DIABETIC POLYNEUROPATHY, WITHOUT LONG-TERM CURRENT USE OF INSULIN (HCC): Primary | ICD-10-CM

## 2019-07-22 DIAGNOSIS — I10 ESSENTIAL HYPERTENSION: ICD-10-CM

## 2019-07-22 PROCEDURE — 99214 OFFICE O/P EST MOD 30 MIN: CPT | Performed by: INTERNAL MEDICINE

## 2019-07-22 PROCEDURE — 3066F NEPHROPATHY DOC TX: CPT | Performed by: INTERNAL MEDICINE

## 2019-07-22 PROCEDURE — 1036F TOBACCO NON-USER: CPT | Performed by: INTERNAL MEDICINE

## 2019-07-22 RX ORDER — FUROSEMIDE 40 MG/1
TABLET ORAL 2 TIMES DAILY
COMMUNITY
Start: 2019-06-21

## 2019-07-22 RX ORDER — METOPROLOL SUCCINATE 50 MG/1
50 TABLET, EXTENDED RELEASE ORAL DAILY
Qty: 90 TABLET | Refills: 2 | Status: SHIPPED | OUTPATIENT
Start: 2019-07-22 | End: 2020-04-24

## 2019-07-22 RX ORDER — LISINOPRIL 5 MG/1
TABLET ORAL DAILY
COMMUNITY
Start: 2019-06-21 | End: 2019-12-26

## 2019-07-22 NOTE — PROGRESS NOTES
INTERNAL MEDICINE OFFICE VISIT  [facility] [address], [address]  Tel: [phone]      NAME: Piedad Means  AGE: 61 y o  SEX: female  : 1960   MRN: [id number]    DATE: 2019  TIME: 9:11 AM      Assessment and Plan:  1  Type 2 diabetes mellitus with diabetic polyneuropathy, without long-term current use of insulin ([facility])  Not very well controlled on the present medications as she had to go off the Actos secondary to the congestive heart failure  She was told to cut down on the carbohydrate intake in her diet as much as possible as I do not want to increase any of her medications for now  A diabetic foot exam was done today in the office  I will recheck labs in 4 months  2  Essential hypertension  Continue present medications  The dose of metoprolol was increased to 50 mg daily by the cardiologist at [facility]   She will be following up with him for the congestive heart failure that was diagnosed while she was in the hospital     She will get the results of the echo so I can find out if her ejection fraction was decreased  - metoprolol succinate (TOPROL-XL) 50 mg 24 hr tablet; Take 1 tablet (50 mg total) by mouth daily  Dispense: 90 tablet; Refill: 2    3  Mixed hyperlipidemia  Continue atorvastatin and fenofibrate    4  GERD without esophagitis   continue omeprazole for now    5  CKD (chronic kidney disease) stage 4, GFR 15-29 ml/min ([facility])   her creatinine has increased a lot, most likely secondary to the use of furosemide  The medication was started recently after she was admitted to the hospital with congestive heart failure  She was told to follow up with the nephrologist  as soon as possible    She wants to follow up with someone in [facility]     6  Screening for breast cancer    - Mammo screening bilateral w 3d & cad; Future      - Counseling Documentation: patient was counseled regarding: diagnostic results, instructions for management, risk factor reductions, prognosis, patient and family education, impressions, risks and benefits of treatment options and importance of compliance with treatment  - Medication Side Effects: Adverse side effects of medications were reviewed with the patient/guardian today  Return for follow up visit in  4 weeks or earlier, if needed  Chief Complaint:  Chief Complaint   Patient presents with    Follow-up     4 month and labs- 07/17/2019         History of Present Illness:    type 2 diabetes -not very well controlled with a hemoglobin A1c of 7 5 as she had to go off the Actos  Hypertension - blood pressure stable on present medication  Hyperlipidemia -takes atorvastatin regularly and also takes the fenofibrate for the triglycerides being high  GERD-takes omeprazole for the symptom control  She might have to go off it secondary to the renal failure  Chronic kidney disease- her creatinine has really gone up and now she is in stage 4 kidney disease  She was told to make an appointment with the nephrologist as soon as possible        Active Problem List:  Patient Active Problem List   Diagnosis    Arthropathy    Carpal tunnel syndrome    Mixed hyperlipidemia    Essential hypertension    Tachycardia    Type 2 diabetes mellitus with diabetic polyneuropathy, without long-term current use of insulin (HCC)    Polyneuropathy associated with underlying disease (HCC)    CKD (chronic kidney disease) stage 3, GFR 30-59 ml/min ([facility])    Abnormal CBC    GERD without esophagitis    Chronic idiopathic constipation    Overweight    CKD (chronic kidney disease) stage 4, GFR 15-29 ml/min ([facility])         Past Medical History:  Past Medical History:   Diagnosis Date    Anxiety     Benign hypertension     Cigarette nicotine dependence without complication 25/55/3693    CTS (carpal tunnel syndrome)     Essential hypertriglyceridemia     Hyperlipidemia     last assessed 2017    Sebaceous cyst     Type 2 diabetes mellitus ([facility] [address] )     last assessed 2017         Past Surgical History:  Past Surgical History:   Procedure Laterality Date    CHOLECYSTECTOMY LAPAROSCOPIC      KNEE SURGERY Right          Family History:  Family History   Problem Relation Age of Onset    Other Mother         acute subdural hematoma    Coronary artery disease Mother         CABG    Lung cancer Father     Gout Brother     Lung cancer Brother          Social History:  Social History     Socioeconomic History    Marital status: /Civil Union     Spouse name: None    Number of children: None    Years of education: None    Highest education level: None   Occupational History    None   Social Needs    Financial resource strain: None    Food insecurity:     Worry: None     Inability: None    Transportation needs:     Medical: None     Non-medical: None   Tobacco Use    Smoking status: Former Smoker     Packs/day: 0 50     Years: 15 00     Pack years: 7 50     Types: Cigarettes     Last attempt to quit: 2019     Years since quittin 5    Smokeless tobacco: Never Used   Substance and Sexual Activity    Alcohol use: No    Drug use: No    Sexual activity: Yes     Partners: Male   Lifestyle    Physical activity:     Days per week: 0 days     Minutes per session: 0 min    Stress: Only a little   Relationships    Social connections:     Talks on phone: None     Gets together: None     Attends Protestant service: None     Active member of club or organization: None     Attends meetings of clubs or organizations: None     Relationship status: None    Intimate partner violence:     Fear of current or ex partner: None     Emotionally abused: None     Physically abused: None     Forced sexual activity: None   Other Topics Concern    None   Social History Narrative    None         Allergies:   Allergies   Allergen Reactions    Wound Dressing Adhesive Rash         Medications:    Current Outpatient Medications:     aspirin 81 MG tablet, Take 1 tablet by mouth daily, Disp: , Rfl:     atorvastatin (LIPITOR) 20 mg tablet, Take 1 tablet (20 mg total) by mouth daily, Disp: 90 tablet, Rfl: 3    fenofibrate (TRICOR) 145 mg tablet, Take 1 tablet (145 mg total) by mouth daily, Disp: 90 tablet, Rfl: 3    furosemide (LASIX) 40 mg tablet, 2 (two) times a day, Disp: , Rfl:     glipiZIDE (GLUCOTROL XL) 10 mg 24 hr tablet, Take 1 tablet (10 mg total) by mouth daily, Disp: 90 tablet, Rfl: 3    lisinopril (ZESTRIL) 5 mg tablet, daily, Disp: , Rfl:     metFORMIN (GLUCOPHAGE) 500 mg tablet, Take 1 tablet (500 mg total) by mouth 2 (two) times a day with meals, Disp: 180 tablet, Rfl: 0    metoprolol succinate (TOPROL-XL) 50 mg 24 hr tablet, Take 1 tablet (50 mg total) by mouth daily, Disp: 90 tablet, Rfl: 2    omeprazole (PriLOSEC) 20 mg delayed release capsule, Take 1 capsule (20 mg total) by mouth daily, Disp: 90 capsule, Rfl: 3    Potassium (POTASSIMIN PO), Take by mouth daily, Disp: , Rfl:     sitaGLIPtin (JANUVIA) 100 mg tablet, Take 1 tablet (100 mg total) by mouth daily, Disp: 90 tablet, Rfl: 3      The following portions of the patient's history were reviewed and updated as appropriate: past medical history, past surgical history, family history, social history, allergies, current medications and active problem list       Review of Systems:  Constitutional: Denies fever, chills, weight gain, weight loss, fatigue  Eyes: Denies eye redness, eye discharge, double vision, change in visual acuity  ENT: Denies hearing loss, tinnitus, sneezing, nasal congestion, nasal discharge, sore throat   Respiratory: Denies cough, expectoration, hemoptysis, shortness of breath, wheezing  Cardiovascular: Denies chest pain, palpitations, lower extremity swelling, orthopnea, PND  Gastrointestinal: Denies abdominal pain, heartburn, nausea, vomiting, hematemesis, diarrhea, bloody stools  Genito-Urinary: Denies dysuria, frequency, difficulty in micturition, nocturia, incontinence  Musculoskeletal: Denies back pain, joint pain, muscle pain  Neurologic: Denies confusion, lightheadedness, syncope, headache, focal weakness, sensory changes, seizures  Endocrine: Denies polyuria, polydipsia, temperature intolerance  Allergy and Immunology: Denies hives, insect bite sensitivity  Hematological and Lymphatic: Denies bleeding problems, swollen glands   Psychological: Denies depression, suicidal ideation, anxiety, panic, mood swings  Dermatological: Denies pruritus, rash, skin lesion changes      Vitals:  Vitals:    07/22/19 0822   BP: 140/84   Pulse: (!) 122   SpO2: 98%       Body mass index is 26 33 kg/m²  Weight (last 2 days)     Date/Time   Weight    07/22/19 0822   68 5 (151) w/ shoes denied off    Weight: w/ shoes denied off at 07/22/19 6413                Physical Examination:  General: Patient is not in acute distress  Awake, alert, responding to commands  No weight gain or loss  Head: Normocephalic  Atraumatic  Eyes: Conjunctiva and lids with no swelling, erythema or discharge  Both pupils normal sized, round and reactive to light  Sclera nonicteric  ENT: External examination of nose and ear normal  Otoscopic examination shows translucent tympanic membranes with patent canals without erythema  Oropharynx moist with no erythema, edema, exudate or lesions  Neck: Supple  JVP not raised  Trachea midline  No masses  No thyromegaly  Lungs: No signs of increased work of breathing or respiratory distress  Bilateral bronchovascular breath sounds with no crackles or rhonchi  Chest wall: No tenderness  Cardiovascular: Normal PMI  No thrills  Regular rate and rhythm  S1 and S2 normal  No murmur, rub or gallop  Gastrointestinal: Abdomen soft, nontender  No guarding or rigidity  Liver and spleen not palpable  Bowel sounds present  Neurologic: Cranial nerves II-XII intact   Cortical functions normal  Motor system - Reflexes 2+ and symmetrical  Sensations normal  Musculoskeletal: Gait normal  No joint tenderness  Integumentary: Skin normal with no rash or lesions  Lymphatic: No palpable lymph nodes in neck, axilla or groin  Extremities: No clubbing, cyanosis, edema or varicosities  Psychological: Judgement and insight normal  Mood and affect normal  Patient's shoes and socks removed  Right Foot/Ankle   Right Foot Inspection  Skin Exam: skin normal and skin intact no dry skin, no warmth, no callus, no erythema, no maceration, no abnormal color, no pre-ulcer, no ulcer and no callus                          Toe Exam: ROM and strength within normal limits  Sensory     Proprioception: diminished and intact   Monofilament testing: intact  Vascular  Capillary refills: < 3 seconds  The right DP pulse is 2+  The right PT pulse is 2+  Left Foot/Ankle  Left Foot Inspection  Skin Exam: skin normal and skin intactno dry skin, no warmth, no erythema, no maceration, normal color, no pre-ulcer, no ulcer and no callus                         Toe Exam: ROM and strength within normal limits                   Sensory     Proprioception: intact  Monofilament: intact  Vascular  Capillary refills: < 3 seconds  The left DP pulse is 2+  The left PT pulse is 2+  Assign Risk Category:  No deformity present; No loss of protective sensation;  No weak pulses       Risk: 0        Laboratory Results:  CBC with diff:   Lab Results   Component Value Date    WBC 10 82 (H) 07/17/2019    WBC 9 0 04/21/2015    RBC 3 66 (L) 07/17/2019    RBC 3 65 (L) 04/21/2015    HGB 9 9 (L) 07/17/2019    HGB 10 8 (L) 04/21/2015    HCT 31 7 (L) 07/17/2019    HCT 32 2 (L) 04/21/2015    MCV 87 07/17/2019    MCV 88 04/21/2015    MCH 27 0 07/17/2019    MCH 29 6 04/21/2015    RDW 15 7 (H) 07/17/2019    RDW 10 7 (L) 04/21/2015     (H) 07/17/2019     (H) 04/21/2015       CMP:  Lab Results   Component Value Date    CREATININE 2 69 (H) 07/17/2019    CREATININE 1 2 (H) 04/21/2015    BUN 60 (H) 07/17/2019    BUN 30 (H) 04/21/2015     04/21/2015    K 4 1 07/17/2019    K 4 8 04/21/2015     07/17/2019     04/21/2015    CO2 26 07/17/2019    CO2 24 9 04/21/2015    GLUCOSE 293 (H) 04/21/2015    PROT 6 8 04/21/2015    ALKPHOS 51 07/17/2019    ALKPHOS 45 (L) 04/21/2015    ALT 18 07/17/2019    ALT 33 04/21/2015    AST 18 07/17/2019    AST 16 04/21/2015       Lab Results   Component Value Date    HGBA1C 7 5 (H) 07/17/2019    HGBA1C 11 2 (H) 04/21/2015       No results found for: TROPONINI, CKMB, CKTOTAL    Lipid Profile:   Lab Results   Component Value Date    CHOL 318 04/21/2015     Lab Results   Component Value Date    HDL 39 (L) 07/17/2019    HDL 44 02/28/2019     Lab Results   Component Value Date    LDLCALC 86 07/17/2019    [facility] 90 02/28/2019     Lab Results   Component Value Date    TRIG 213 (H) 07/17/2019    TRIG 158 (H) 02/28/2019       Imaging Results:  RADIOLOGY RESULTS  Ordered by an unspecified provider         Health Maintenance:  Health Maintenance   Topic Date Due    Hepatitis C Screening  1960    CRC Screening: Colonoscopy  1960    Pneumococcal Vaccine: Pediatrics (0 to 5 Years) and At-Risk Patients (6 to 59 Years) (1 of 3 - PCV13) 01/16/1966    DM Eye Exam  01/16/1970    HEPATITIS B VACCINES (1 of 3 - Risk 3-dose series) 01/16/1979    DTaP,Tdap,and Td Vaccines (1 - Tdap) 01/16/1981    PAP SMEAR  01/16/1981    Diabetic Foot Exam  06/12/2019    INFLUENZA VACCINE  07/01/2019    HEMOGLOBIN A1C  01/17/2020    URINE MICROALBUMIN  02/28/2020    BMI: Followup Plan  03/05/2020    Depression Screening PHQ  07/22/2020    BMI: Adult  07/22/2020    Pneumococcal Vaccine: 65+ Years (1 of 2 - PCV13) 01/16/2025     Immunization History   Administered Date(s) Administered    Influenza, recombinant, quadrivalent,injectable, preservative free 10/23/2018         Kareem Benítez MD  7/22/2019,9:11 AM

## 2019-08-01 DIAGNOSIS — E11.42 TYPE 2 DIABETES MELLITUS WITH DIABETIC POLYNEUROPATHY, WITHOUT LONG-TERM CURRENT USE OF INSULIN (HCC): ICD-10-CM

## 2019-08-24 DIAGNOSIS — E11.42 TYPE 2 DIABETES MELLITUS WITH DIABETIC POLYNEUROPATHY, WITHOUT LONG-TERM CURRENT USE OF INSULIN (HCC): ICD-10-CM

## 2019-09-11 ENCOUNTER — TELEPHONE (OUTPATIENT)
Dept: INTERNAL MEDICINE CLINIC | Facility: CLINIC | Age: 59
End: 2019-09-11

## 2019-09-24 DIAGNOSIS — E11.42 TYPE 2 DIABETES MELLITUS WITH DIABETIC POLYNEUROPATHY, WITHOUT LONG-TERM CURRENT USE OF INSULIN (HCC): ICD-10-CM

## 2019-10-22 DIAGNOSIS — E11.42 TYPE 2 DIABETES MELLITUS WITH DIABETIC POLYNEUROPATHY, WITHOUT LONG-TERM CURRENT USE OF INSULIN (HCC): ICD-10-CM

## 2019-10-31 ENCOUNTER — APPOINTMENT (OUTPATIENT)
Dept: LAB | Facility: CLINIC | Age: 59
End: 2019-10-31
Payer: COMMERCIAL

## 2019-10-31 ENCOUNTER — TRANSCRIBE ORDERS (OUTPATIENT)
Dept: LAB | Facility: CLINIC | Age: 59
End: 2019-10-31

## 2019-10-31 DIAGNOSIS — N17.8 ACUTE RENAL FAILURE WITH PATHOLOGICAL LESION IN KIDNEY (HCC): Primary | ICD-10-CM

## 2019-10-31 DIAGNOSIS — N17.8 ACUTE RENAL FAILURE WITH PATHOLOGICAL LESION IN KIDNEY (HCC): ICD-10-CM

## 2019-10-31 LAB
ALBUMIN SERPL BCP-MCNC: 4.2 G/DL (ref 3.5–5)
ALP SERPL-CCNC: 63 U/L (ref 46–116)
ALT SERPL W P-5'-P-CCNC: 27 U/L (ref 12–78)
ANION GAP SERPL CALCULATED.3IONS-SCNC: 11 MMOL/L (ref 4–13)
AST SERPL W P-5'-P-CCNC: 14 U/L (ref 5–45)
BILIRUB SERPL-MCNC: 0.23 MG/DL (ref 0.2–1)
BUN SERPL-MCNC: 50 MG/DL (ref 5–25)
CALCIUM SERPL-MCNC: 9.2 MG/DL (ref 8.3–10.1)
CHLORIDE SERPL-SCNC: 98 MMOL/L (ref 100–108)
CO2 SERPL-SCNC: 25 MMOL/L (ref 21–32)
CREAT SERPL-MCNC: 2.13 MG/DL (ref 0.6–1.3)
ERYTHROCYTE [DISTWIDTH] IN BLOOD BY AUTOMATED COUNT: 11.7 % (ref 11.6–15.1)
GFR SERPL CREATININE-BSD FRML MDRD: 25 ML/MIN/1.73SQ M
GLUCOSE P FAST SERPL-MCNC: 435 MG/DL (ref 65–99)
HCT VFR BLD AUTO: 32.7 % (ref 34.8–46.1)
HGB BLD-MCNC: 10.4 G/DL (ref 11.5–15.4)
MAGNESIUM SERPL-MCNC: 2.1 MG/DL (ref 1.6–2.6)
MCH RBC QN AUTO: 28.3 PG (ref 26.8–34.3)
MCHC RBC AUTO-ENTMCNC: 31.8 G/DL (ref 31.4–37.4)
MCV RBC AUTO: 89 FL (ref 82–98)
PHOSPHATE SERPL-MCNC: 3.4 MG/DL (ref 2.7–4.5)
PLATELET # BLD AUTO: 444 THOUSANDS/UL (ref 149–390)
PMV BLD AUTO: 10.9 FL (ref 8.9–12.7)
POTASSIUM SERPL-SCNC: 3.6 MMOL/L (ref 3.5–5.3)
PROT SERPL-MCNC: 7.8 G/DL (ref 6.4–8.2)
RBC # BLD AUTO: 3.68 MILLION/UL (ref 3.81–5.12)
SODIUM SERPL-SCNC: 134 MMOL/L (ref 136–145)
WBC # BLD AUTO: 10.7 THOUSAND/UL (ref 4.31–10.16)

## 2019-10-31 PROCEDURE — 85027 COMPLETE CBC AUTOMATED: CPT

## 2019-10-31 PROCEDURE — 83735 ASSAY OF MAGNESIUM: CPT

## 2019-10-31 PROCEDURE — 36415 COLL VENOUS BLD VENIPUNCTURE: CPT

## 2019-10-31 PROCEDURE — 80053 COMPREHEN METABOLIC PANEL: CPT

## 2019-10-31 PROCEDURE — 84100 ASSAY OF PHOSPHORUS: CPT

## 2019-11-21 DIAGNOSIS — E11.42 TYPE 2 DIABETES MELLITUS WITH DIABETIC POLYNEUROPATHY, WITHOUT LONG-TERM CURRENT USE OF INSULIN (HCC): ICD-10-CM

## 2019-11-21 DIAGNOSIS — E78.2 MIXED HYPERLIPIDEMIA: ICD-10-CM

## 2019-11-21 DIAGNOSIS — K21.9 GERD WITHOUT ESOPHAGITIS: ICD-10-CM

## 2019-11-21 RX ORDER — SITAGLIPTIN 100 MG/1
TABLET, FILM COATED ORAL
Qty: 30 TABLET | Refills: 2 | Status: SHIPPED | OUTPATIENT
Start: 2019-11-21 | End: 2020-02-19

## 2019-11-21 RX ORDER — ATORVASTATIN CALCIUM 20 MG/1
TABLET, FILM COATED ORAL
Qty: 30 TABLET | Refills: 2 | Status: SHIPPED | OUTPATIENT
Start: 2019-11-21 | End: 2020-02-19

## 2019-11-21 RX ORDER — FENOFIBRATE 145 MG/1
TABLET, COATED ORAL
Qty: 30 TABLET | Refills: 2 | Status: SHIPPED | OUTPATIENT
Start: 2019-11-21 | End: 2020-02-19

## 2019-11-21 RX ORDER — GLIPIZIDE 10 MG/1
TABLET, FILM COATED, EXTENDED RELEASE ORAL
Qty: 30 TABLET | Refills: 2 | Status: SHIPPED | OUTPATIENT
Start: 2019-11-21 | End: 2020-02-19

## 2019-11-21 RX ORDER — OMEPRAZOLE 20 MG/1
CAPSULE, DELAYED RELEASE ORAL
Qty: 30 CAPSULE | Refills: 2 | Status: SHIPPED | OUTPATIENT
Start: 2019-11-21 | End: 2020-02-19

## 2019-11-25 DIAGNOSIS — E11.42 TYPE 2 DIABETES MELLITUS WITH DIABETIC POLYNEUROPATHY, WITHOUT LONG-TERM CURRENT USE OF INSULIN (HCC): ICD-10-CM

## 2019-11-27 ENCOUNTER — TRANSCRIBE ORDERS (OUTPATIENT)
Dept: LAB | Facility: CLINIC | Age: 59
End: 2019-11-27

## 2019-11-27 ENCOUNTER — APPOINTMENT (OUTPATIENT)
Dept: LAB | Facility: CLINIC | Age: 59
End: 2019-11-27
Payer: COMMERCIAL

## 2019-11-27 DIAGNOSIS — N18.4 CHRONIC KIDNEY DISEASE, STAGE IV (SEVERE) (HCC): Primary | ICD-10-CM

## 2019-11-27 DIAGNOSIS — E11.42 TYPE 2 DIABETES MELLITUS WITH DIABETIC POLYNEUROPATHY, WITHOUT LONG-TERM CURRENT USE OF INSULIN (HCC): ICD-10-CM

## 2019-11-27 DIAGNOSIS — I10 ESSENTIAL HYPERTENSION: ICD-10-CM

## 2019-11-27 DIAGNOSIS — N18.4 CHRONIC KIDNEY DISEASE, STAGE IV (SEVERE) (HCC): ICD-10-CM

## 2019-11-27 LAB
ALBUMIN SERPL BCP-MCNC: 3.7 G/DL (ref 3.5–5)
ALP SERPL-CCNC: 45 U/L (ref 46–116)
ALT SERPL W P-5'-P-CCNC: 26 U/L (ref 12–78)
ANION GAP SERPL CALCULATED.3IONS-SCNC: 8 MMOL/L (ref 4–13)
AST SERPL W P-5'-P-CCNC: 12 U/L (ref 5–45)
BASOPHILS # BLD AUTO: 0.06 THOUSANDS/ΜL (ref 0–0.1)
BASOPHILS NFR BLD AUTO: 1 % (ref 0–1)
BILIRUB SERPL-MCNC: 0.33 MG/DL (ref 0.2–1)
BUN SERPL-MCNC: 47 MG/DL (ref 5–25)
C3 SERPL-MCNC: 151 MG/DL (ref 90–180)
C4 SERPL-MCNC: 61 MG/DL (ref 10–40)
CALCIUM SERPL-MCNC: 8.8 MG/DL (ref 8.3–10.1)
CHLORIDE SERPL-SCNC: 102 MMOL/L (ref 100–108)
CHOLEST SERPL-MCNC: 222 MG/DL (ref 50–200)
CO2 SERPL-SCNC: 27 MMOL/L (ref 21–32)
CREAT SERPL-MCNC: 2.25 MG/DL (ref 0.6–1.3)
CREAT UR-MCNC: 69.4 MG/DL
EOSINOPHIL # BLD AUTO: 0.17 THOUSAND/ΜL (ref 0–0.61)
EOSINOPHIL NFR BLD AUTO: 2 % (ref 0–6)
ERYTHROCYTE [DISTWIDTH] IN BLOOD BY AUTOMATED COUNT: 11.7 % (ref 11.6–15.1)
EST. AVERAGE GLUCOSE BLD GHB EST-MCNC: 332 MG/DL
GFR SERPL CREATININE-BSD FRML MDRD: 23 ML/MIN/1.73SQ M
GLUCOSE P FAST SERPL-MCNC: 342 MG/DL (ref 65–99)
HBA1C MFR BLD: 13.2 % (ref 4.2–6.3)
HBV SURFACE AB SER-ACNC: <3.1 MIU/ML
HBV SURFACE AG SER QL: NORMAL
HCT VFR BLD AUTO: 31.7 % (ref 34.8–46.1)
HCV AB SER QL: NORMAL
HDLC SERPL-MCNC: 39 MG/DL
HGB BLD-MCNC: 9.9 G/DL (ref 11.5–15.4)
IMM GRANULOCYTES # BLD AUTO: 0.16 THOUSAND/UL (ref 0–0.2)
IMM GRANULOCYTES NFR BLD AUTO: 2 % (ref 0–2)
INR PPP: 0.96 (ref 0.84–1.19)
LDLC SERPL CALC-MCNC: 103 MG/DL (ref 0–100)
LYMPHOCYTES # BLD AUTO: 1.5 THOUSANDS/ΜL (ref 0.6–4.47)
LYMPHOCYTES NFR BLD AUTO: 15 % (ref 14–44)
MCH RBC QN AUTO: 27.7 PG (ref 26.8–34.3)
MCHC RBC AUTO-ENTMCNC: 31.2 G/DL (ref 31.4–37.4)
MCV RBC AUTO: 89 FL (ref 82–98)
MONOCYTES # BLD AUTO: 0.41 THOUSAND/ΜL (ref 0.17–1.22)
MONOCYTES NFR BLD AUTO: 4 % (ref 4–12)
NEUTROPHILS # BLD AUTO: 7.84 THOUSANDS/ΜL (ref 1.85–7.62)
NEUTS SEG NFR BLD AUTO: 76 % (ref 43–75)
NONHDLC SERPL-MCNC: 183 MG/DL
NRBC BLD AUTO-RTO: 0 /100 WBCS
PHOSPHATE SERPL-MCNC: 3.2 MG/DL (ref 2.7–4.5)
PLATELET # BLD AUTO: 481 THOUSANDS/UL (ref 149–390)
PMV BLD AUTO: 10.7 FL (ref 8.9–12.7)
POTASSIUM SERPL-SCNC: 3.8 MMOL/L (ref 3.5–5.3)
PROT SERPL-MCNC: 7.9 G/DL (ref 6.4–8.2)
PROT UR-MCNC: 77 MG/DL
PROT/CREAT UR: 1.11 MG/G{CREAT} (ref 0–0.1)
PROTHROMBIN TIME: 12.4 SECONDS (ref 11.6–14.5)
RBC # BLD AUTO: 3.58 MILLION/UL (ref 3.81–5.12)
SODIUM SERPL-SCNC: 137 MMOL/L (ref 136–145)
TRIGL SERPL-MCNC: 400 MG/DL
TSH SERPL DL<=0.05 MIU/L-ACNC: 1.73 UIU/ML (ref 0.36–3.74)
WBC # BLD AUTO: 10.14 THOUSAND/UL (ref 4.31–10.16)

## 2019-11-27 PROCEDURE — 86803 HEPATITIS C AB TEST: CPT

## 2019-11-27 PROCEDURE — 83036 HEMOGLOBIN GLYCOSYLATED A1C: CPT

## 2019-11-27 PROCEDURE — 82570 ASSAY OF URINE CREATININE: CPT | Performed by: INTERNAL MEDICINE

## 2019-11-27 PROCEDURE — 85610 PROTHROMBIN TIME: CPT

## 2019-11-27 PROCEDURE — 83883 ASSAY NEPHELOMETRY NOT SPEC: CPT

## 2019-11-27 PROCEDURE — 86255 FLUORESCENT ANTIBODY SCREEN: CPT

## 2019-11-27 PROCEDURE — 86334 IMMUNOFIX E-PHORESIS SERUM: CPT | Performed by: PATHOLOGY

## 2019-11-27 PROCEDURE — 86334 IMMUNOFIX E-PHORESIS SERUM: CPT

## 2019-11-27 PROCEDURE — 80053 COMPREHEN METABOLIC PANEL: CPT

## 2019-11-27 PROCEDURE — 80061 LIPID PANEL: CPT

## 2019-11-27 PROCEDURE — 86160 COMPLEMENT ANTIGEN: CPT

## 2019-11-27 PROCEDURE — 36415 COLL VENOUS BLD VENIPUNCTURE: CPT

## 2019-11-27 PROCEDURE — 85025 COMPLETE CBC W/AUTO DIFF WBC: CPT

## 2019-11-27 PROCEDURE — 84100 ASSAY OF PHOSPHORUS: CPT

## 2019-11-27 PROCEDURE — 84166 PROTEIN E-PHORESIS/URINE/CSF: CPT | Performed by: PATHOLOGY

## 2019-11-27 PROCEDURE — 84165 PROTEIN E-PHORESIS SERUM: CPT | Performed by: PATHOLOGY

## 2019-11-27 PROCEDURE — 87340 HEPATITIS B SURFACE AG IA: CPT

## 2019-11-27 PROCEDURE — 86038 ANTINUCLEAR ANTIBODIES: CPT

## 2019-11-27 PROCEDURE — 83520 IMMUNOASSAY QUANT NOS NONAB: CPT

## 2019-11-27 PROCEDURE — 84443 ASSAY THYROID STIM HORMONE: CPT

## 2019-11-27 PROCEDURE — 83516 IMMUNOASSAY NONANTIBODY: CPT | Performed by: INTERNAL MEDICINE

## 2019-11-27 PROCEDURE — 86706 HEP B SURFACE ANTIBODY: CPT

## 2019-11-27 PROCEDURE — 84156 ASSAY OF PROTEIN URINE: CPT | Performed by: INTERNAL MEDICINE

## 2019-11-27 PROCEDURE — 84166 PROTEIN E-PHORESIS/URINE/CSF: CPT | Performed by: INTERNAL MEDICINE

## 2019-11-27 PROCEDURE — 84165 PROTEIN E-PHORESIS SERUM: CPT

## 2019-11-29 LAB
KAPPA LC FREE SER-MCNC: 100.2 MG/L (ref 3.3–19.4)
KAPPA LC FREE/LAMBDA FREE SER: 1.14 {RATIO} (ref 0.26–1.65)
LAMBDA LC FREE SERPL-MCNC: 87.8 MG/L (ref 5.7–26.3)
RYE IGE QN: NEGATIVE

## 2019-11-30 LAB
ALBUMIN UR ELPH-MCNC: 71.2 %
ALPHA1 GLOB MFR UR ELPH: 8.6 %
ALPHA2 GLOB MFR UR ELPH: 3.4 %
B-GLOBULIN MFR UR ELPH: 8.8 %
GAMMA GLOB MFR UR ELPH: 8 %
PROT PATTERN UR ELPH-IMP: ABNORMAL
PROT UR-MCNC: 75 MG/DL

## 2019-12-02 LAB
ALBUMIN SERPL ELPH-MCNC: 4.29 G/DL (ref 3.5–5)
ALBUMIN SERPL ELPH-MCNC: 58 % (ref 52–65)
ALPHA1 GLOB SERPL ELPH-MCNC: 0.31 G/DL (ref 0.1–0.4)
ALPHA1 GLOB SERPL ELPH-MCNC: 4.2 % (ref 2.5–5)
ALPHA2 GLOB SERPL ELPH-MCNC: 0.75 G/DL (ref 0.4–1.2)
ALPHA2 GLOB SERPL ELPH-MCNC: 10.2 % (ref 7–13)
BETA GLOB ABNORMAL SERPL ELPH-MCNC: 0.59 G/DL (ref 0.4–0.8)
BETA1 GLOB SERPL ELPH-MCNC: 8 % (ref 5–13)
BETA2 GLOB SERPL ELPH-MCNC: 6.7 % (ref 2–8)
BETA2+GAMMA GLOB SERPL ELPH-MCNC: 0.5 G/DL (ref 0.2–0.5)
C-ANCA TITR SER IF: NORMAL TITER
GAMMA GLOB ABNORMAL SERPL ELPH-MCNC: 0.95 G/DL (ref 0.5–1.6)
GAMMA GLOB SERPL ELPH-MCNC: 12.9 % (ref 12–22)
GBM AB SER IA-ACNC: 3 UNITS (ref 0–20)
IGG/ALB SER: 1.38 {RATIO} (ref 1.1–1.8)
INTERPRETATION UR IFE-IMP: NORMAL
M PROTEIN 1 MFR SERPL ELPH: 1.9 %
M PROTEIN 1 SERPL ELPH-MCNC: 0.14 G/DL
MYELOPEROXIDASE AB SER IA-ACNC: <9 U/ML (ref 0–9)
P-ANCA ATYPICAL TITR SER IF: NORMAL TITER
P-ANCA TITR SER IF: NORMAL TITER
PROT PATTERN SERPL ELPH-IMP: NORMAL
PROT SERPL-MCNC: 7.4 G/DL (ref 6.4–8.2)
PROTEINASE3 AB SER IA-ACNC: <3.5 U/ML (ref 0–3.5)

## 2019-12-04 LAB — MISCELLANEOUS LAB TEST RESULT: NORMAL

## 2019-12-26 ENCOUNTER — OFFICE VISIT (OUTPATIENT)
Dept: INTERNAL MEDICINE CLINIC | Facility: CLINIC | Age: 59
End: 2019-12-26
Payer: COMMERCIAL

## 2019-12-26 VITALS
HEART RATE: 99 BPM | WEIGHT: 155 LBS | SYSTOLIC BLOOD PRESSURE: 122 MMHG | DIASTOLIC BLOOD PRESSURE: 86 MMHG | OXYGEN SATURATION: 96 % | BODY MASS INDEX: 27.03 KG/M2

## 2019-12-26 DIAGNOSIS — I50.22 CHRONIC SYSTOLIC CONGESTIVE HEART FAILURE (HCC): ICD-10-CM

## 2019-12-26 DIAGNOSIS — Z11.4 ENCOUNTER FOR SCREENING FOR HUMAN IMMUNODEFICIENCY VIRUS (HIV): ICD-10-CM

## 2019-12-26 DIAGNOSIS — E11.42 TYPE 2 DIABETES MELLITUS WITH DIABETIC POLYNEUROPATHY, WITHOUT LONG-TERM CURRENT USE OF INSULIN (HCC): ICD-10-CM

## 2019-12-26 DIAGNOSIS — N18.4 CKD (CHRONIC KIDNEY DISEASE) STAGE 4, GFR 15-29 ML/MIN (HCC): ICD-10-CM

## 2019-12-26 DIAGNOSIS — I10 ESSENTIAL HYPERTENSION: ICD-10-CM

## 2019-12-26 DIAGNOSIS — E78.2 MIXED HYPERLIPIDEMIA: ICD-10-CM

## 2019-12-26 DIAGNOSIS — Z79.4 TYPE 2 DIABETES MELLITUS WITH DIABETIC POLYNEUROPATHY, WITH LONG-TERM CURRENT USE OF INSULIN (HCC): Primary | ICD-10-CM

## 2019-12-26 DIAGNOSIS — E11.42 TYPE 2 DIABETES MELLITUS WITH DIABETIC POLYNEUROPATHY, WITH LONG-TERM CURRENT USE OF INSULIN (HCC): Primary | ICD-10-CM

## 2019-12-26 DIAGNOSIS — Z12.11 SCREENING FOR COLON CANCER: ICD-10-CM

## 2019-12-26 PROBLEM — R79.89 ABNORMAL CBC: Status: RESOLVED | Noted: 2018-06-12 | Resolved: 2019-12-26

## 2019-12-26 PROBLEM — N18.30 CKD (CHRONIC KIDNEY DISEASE) STAGE 3, GFR 30-59 ML/MIN (HCC): Status: RESOLVED | Noted: 2018-06-12 | Resolved: 2019-12-26

## 2019-12-26 PROBLEM — R80.9 NEPHROTIC RANGE PROTEINURIA: Status: ACTIVE | Noted: 2019-11-06

## 2019-12-26 PROCEDURE — 3074F SYST BP LT 130 MM HG: CPT | Performed by: INTERNAL MEDICINE

## 2019-12-26 PROCEDURE — 99215 OFFICE O/P EST HI 40 MIN: CPT | Performed by: INTERNAL MEDICINE

## 2019-12-26 PROCEDURE — 3066F NEPHROPATHY DOC TX: CPT | Performed by: INTERNAL MEDICINE

## 2019-12-26 PROCEDURE — 3079F DIAST BP 80-89 MM HG: CPT | Performed by: INTERNAL MEDICINE

## 2019-12-26 PROCEDURE — 1036F TOBACCO NON-USER: CPT | Performed by: INTERNAL MEDICINE

## 2019-12-26 NOTE — PROGRESS NOTES
INTERNAL MEDICINE OFFICE VISIT  [facility] AT [facility], [facility], [address] [facility]  Tel: [phone]      NAME: Gordon Blackburn  AGE: 61 y o  SEX: female  : 1960   MRN: [id number]    DATE: 2019  TIME: 10:21 AM      Assessment and Plan:  1  Type 2 diabetes mellitus with diabetic polyneuropathy, with long-term current use of insulin ([facility])   extremely noncompliant patient with very poor control of the diabetes  She is only taking glipizide and Januvia at present and her last hemoglobin A1c was very high at 13 2  She continued to refuse to take the insulin all this time  Today I told her that I cannot treat her and that she needs to find another doctor if she is so noncompliant     She finally agreed that she will try to take the insulin  She was told to check her blood glucose levels daily but she said she will check it off and on  She will call me with her blood  Glucose results in about 2 weeks so I can adjust the dose of the insulin  I will recheck labs in 3 months  She refused to do the iris exam in the office because she has an appointment with [facility] in February  I will hope that she will use insulin and do it regularly  I spent more than half an hour trying to explain to her the importance of diabetic control       - insulin glargine (LANTUS SOLOSTAR) 100 units/mL injection pen; Inject 30 Units under the skin daily  Dispense: 5 pen; Refill: 3  - CBC and differential; Future  - Comprehensive metabolic panel; Future  - TSH, 3rd generation; Future  - Hemoglobin A1C; Future    2  Essential hypertension  Continue present medication    3  Mixed hyperlipidemia   continue atorvastatin  - Lipid panel; Future    4  CKD (chronic kidney disease) stage 4, GFR 15-29 ml/min ([facility])   follow up with Nephrology    5  Chronic systolic congestive heart failure ([facility])   continue furosemide    She was also advised to follow up with Cardiology    6  Screening for colon cancer    - Ambulatory referral to Gastroenterology; Future    7  Encounter for screening for human immunodeficiency virus (HIV)    - Human Immunodeficiency Virus 1/2 Antigen / Antibody ( Fourth Generation) with Reflex Testing; Future      - Counseling Documentation: patient was counseled regarding: diagnostic results, instructions for management, risk factor reductions, prognosis, patient and family education, impressions, risks and benefits of treatment options and importance of compliance with treatment  - Medication Side Effects: Adverse side effects of medications were reviewed with the patient/guardian today  Return for follow up visit in  3 months or earlier, if needed  Chief Complaint:  Chief Complaint   Patient presents with    Follow-up     4 month          History of Present Illness:    type 2 diabetes -has been very poorly controlled for a long time but refuses to follow any orders or recommendations given  She is extremely noncompliant and says that she cannot pop pills or inject herself every day  Hypertension -well controlled on medication  Hyperlipidemia -takes atorvastatin regularly   congestive heart failure -she was admitted to the hospital with congestive heart failure which she was told was due to the Actos  The Actos was discontinued but she continue to have fluid overload  She was started on the furosemide and presently is taking 120 mg daily  She is following up with Nephrology and was told that she has nephrotic range proteinuria most likely secondary to the uncontrolled diabetes  Chronic kidney disease - her creatinine is more than 2 and she follows up with Nephrology but her prognosis does not seem to be very good as she is not cooperative and does not want to help her doctors to control her blood glucose levels          Active Problem List:  Patient Active Problem List   Diagnosis    Arthropathy    Carpal tunnel syndrome    Mixed hyperlipidemia    Essential hypertension    Tachycardia    Type 2 diabetes mellitus with diabetic polyneuropathy, with long-term current use of insulin ([facility])    Polyneuropathy associated with underlying disease ([facility] )    GERD without esophagitis    Chronic idiopathic constipation    Overweight    CKD (chronic kidney disease) stage 4, GFR 15-29 ml/min ([facility])    Chronic systolic congestive heart failure ([facility])    Nephrotic range proteinuria         Past Medical History:  Past Medical History:   Diagnosis Date    Abnormal CBC 2018    Anxiety     Benign hypertension     Cigarette nicotine dependence without complication     CTS (carpal tunnel syndrome)     Essential hypertriglyceridemia     Hyperlipidemia     last assessed 2017    Sebaceous cyst     Type 2 diabetes mellitus ([facility] )     last assessed 2017         Past Surgical History:  Past Surgical History:   Procedure Laterality Date    CHOLECYSTECTOMY LAPAROSCOPIC      KNEE SURGERY Right          Family History:  Family History   Problem Relation Age of Onset    Other Mother         acute subdural hematoma    Coronary artery disease Mother         CABG    Lung cancer Father     Gout Brother     Lung cancer Brother          Social History:  Social History     Socioeconomic History    Marital status: /Civil Union     Spouse name: None    Number of children: None    Years of education: None    Highest education level: None   Occupational History    None   Social Needs    Financial resource strain: None    Food insecurity:     Worry: None     Inability: None    Transportation needs:     Medical: None     Non-medical: None   Tobacco Use    Smoking status: Former Smoker     Packs/day: 0 50     Years: 15 00     Pack years: 7 50     Types: Cigarettes     Last attempt to quit: 2019     Years since quittin 9    Smokeless tobacco: Never Used   Substance and Sexual Activity    Alcohol use: No    Drug use: No    Sexual activity: Yes     Partners: Male   Lifestyle    Physical activity:     Days per week: 0 days     Minutes per session: 0 min    Stress: Only a little   Relationships    Social connections:     Talks on phone: None     Gets together: None     Attends [employer] service: None     Active member of club or organization: None     Attends meetings of clubs or organizations: None     Relationship status: None    Intimate partner violence:     Fear of current or ex partner: None     Emotionally abused: None     Physically abused: None     Forced sexual activity: None   Other Topics Concern    None   Social History Narrative    None         Allergies:   Allergies   Allergen Reactions    Wound Dressing Adhesive Rash         Medications:    Current Outpatient Medications:     aspirin 81 MG tablet, Take 1 tablet by mouth daily, Disp: , Rfl:     atorvastatin (LIPITOR) 20 mg tablet, TAKE ONE TABLET BY MOUTH EVERY DAY , Disp: 30 tablet, Rfl: 2    fenofibrate (TRICOR) 145 mg tablet, TAKE ONE TABLET BY MOUTH EVERY DAY , Disp: 30 tablet, Rfl: 2    furosemide (LASIX) 40 mg tablet, 2 (two) times a day Take 2 tablets in the morning and 1 tablet at night, Disp: , Rfl:     glipiZIDE (GLUCOTROL XL) 10 mg 24 hr tablet, TAKE ONE TABLET BY MOUTH EVERY DAY , Disp: 30 tablet, Rfl: 2    JANUVIA 100 MG tablet, TAKE ONE TABLET BY MOUTH EVERY DAY , Disp: 30 tablet, Rfl: 2    metoprolol succinate (TOPROL-XL) 50 mg 24 hr tablet, Take 1 tablet (50 mg total) by mouth daily, Disp: 90 tablet, Rfl: 2    omeprazole (PriLOSEC) 20 mg delayed release capsule, TAKE ONE CAPSULE BY MOUTH EVERY DAY , Disp: 30 capsule, Rfl: 2    Potassium (POTASSIMIN PO), Take by mouth daily, Disp: , Rfl:     insulin glargine (LANTUS SOLOSTAR) 100 units/mL injection pen, Inject 30 Units under the skin daily, Disp: 5 pen, Rfl: 3      The following portions of the patient's history were reviewed and updated as appropriate: past medical history, past surgical history, family history, social history, allergies, current medications and active problem list       Review of Systems:  Constitutional: Denies fever, chills, weight gain, weight loss, fatigue  Eyes: Denies eye redness, eye discharge, double vision, change in visual acuity  ENT: Denies hearing loss, tinnitus, sneezing, nasal congestion, nasal discharge, sore throat   Respiratory: Denies cough, expectoration, hemoptysis, shortness of breath, wheezing  Cardiovascular: Denies chest pain, palpitations, lower extremity swelling, orthopnea, PND  Gastrointestinal: Denies abdominal pain, heartburn, nausea, vomiting, hematemesis, diarrhea, bloody stools  Genito-Urinary: Denies dysuria, frequency, difficulty in micturition, nocturia, incontinence  Musculoskeletal: Denies back pain, joint pain, muscle pain  Neurologic: Denies confusion, lightheadedness, syncope, headache, focal weakness, sensory changes, seizures  Endocrine: Denies polyuria, polydipsia, temperature intolerance  Allergy and Immunology: Denies hives, insect bite sensitivity  Hematological and Lymphatic: Denies bleeding problems, swollen glands   Psychological: Denies depression, suicidal ideation, anxiety, panic, mood swings  Dermatological: Denies pruritus, rash, skin lesion changes      Vitals:  Vitals:    12/26/19 0749   BP: 122/86   Pulse: 99   SpO2: 96%       Body mass index is 27 03 kg/m²  Weight (last 2 days)     Date/Time   Weight    12/26/19 0749   70 3 (155) w/ shoes denied off    Weight: w/ shoes denied off at 12/26/19 0749                Physical Examination:  General: Patient is not in acute distress  Awake, alert, responding to commands  No weight gain or loss  Head: Normocephalic  Atraumatic  Eyes: Conjunctiva and lids with no swelling, erythema or discharge  Both pupils normal sized, round and reactive to light   Sclera nonicteric  ENT: External examination of nose and ear normal  Otoscopic examination shows translucent tympanic membranes with patent canals without erythema  Oropharynx moist with no erythema, edema, exudate or lesions  Neck: Supple  JVP not raised  Trachea midline  No masses  No thyromegaly  Lungs: No signs of increased work of breathing or respiratory distress  Bilateral bronchovascular breath sounds with no crackles or rhonchi  Chest wall: No tenderness  Cardiovascular: Normal PMI  No thrills  Regular rate and rhythm  S1 and S2 normal  No murmur, rub or gallop  Gastrointestinal: Abdomen soft, nontender  No guarding or rigidity  Liver and spleen not palpable  Bowel sounds present  Neurologic: Cranial nerves II-XII intact   Cortical functions normal  Motor system - Reflexes 2+ and symmetrical  Sensations normal  Musculoskeletal: Gait normal  No joint tenderness  Integumentary: Skin normal with no rash or lesions  Lymphatic: No palpable lymph nodes in neck, axilla or groin  Extremities: No clubbing, cyanosis, edema or varicosities  Psychological: Judgement and insight normal  Mood and affect normal      Laboratory Results:  CBC with diff:   Lab Results   Component Value Date    WBC 10 14 11/27/2019    WBC 9 0 04/21/2015    RBC 3 58 (L) 11/27/2019    RBC 3 65 (L) 04/21/2015    HGB 9 9 (L) 11/27/2019    HGB 10 8 (L) 04/21/2015    HCT 31 7 (L) 11/27/2019    HCT 32 2 (L) 04/21/2015    MCV 89 11/27/2019    MCV 88 04/21/2015    MCH 27 7 11/27/2019    MCH 29 6 04/21/2015    RDW 11 7 11/27/2019    RDW 10 7 (L) 04/21/2015     (H) 11/27/2019     (H) 04/21/2015       CMP:  Lab Results   Component Value Date    CREATININE 2 25 (H) 11/27/2019    CREATININE 1 2 (H) 04/21/2015    BUN 47 (H) 11/27/2019    BUN 30 (H) 04/21/2015     04/21/2015    K 3 8 11/27/2019    K 4 8 04/21/2015     11/27/2019     04/21/2015    CO2 27 11/27/2019    CO2 24 9 04/21/2015    GLUCOSE 293 (H) 04/21/2015    PROT 6 8 04/21/2015    ALKPHOS 45 (L) 11/27/2019    ALKPHOS 45 (L) 04/21/2015    ALT 26 11/27/2019    ALT 33 04/21/2015    AST 12 11/27/2019    AST 16 04/21/2015       Lab Results   Component Value Date    HGBA1C 13 2 (H) 11/27/2019    HGBA1C 11 2 (H) 04/21/2015    MG 2 1 10/31/2019    PHOS 3 2 11/27/2019       No results found for: TROPONINI, CKMB, CKTOTAL    Lipid Profile:   Lab Results   Component Value Date    CHOL 318 04/21/2015     Lab Results   Component Value Date    HDL 39 (L) 11/27/2019    HDL 39 (L) 07/17/2019     Lab Results   Component Value Date    LDLCALC 103 (H) 11/27/2019    LDLCALC 86 07/17/2019     Lab Results   Component Value Date    TRIG 400 (H) 11/27/2019    TRIG 213 (H) 07/17/2019       Imaging Results:  RADIOLOGY RESULTS  Ordered by an unspecified provider         Health Maintenance:  Health Maintenance   Topic Date Due    MAMMOGRAM  1960    CRC Screening: Colonoscopy  1960    Pneumococcal Vaccine: Pediatrics (0 to 5 Years) and At-Risk Patients (6 to 59 Years) (1 of 1 - PPSV23) 01/16/1966    DM Eye Exam  01/16/1970    DTaP,Tdap,and Td Vaccines (1 - Tdap) 01/16/1971    HIV Screening  01/16/1975    Hepatitis B Vaccine (1 of 3 - Risk 3-dose series) 01/16/1979    Cervical Cancer Screening  01/16/1981    Influenza Vaccine  07/01/2019    BMI: Followup Plan  03/05/2020    HEMOGLOBIN A1C  05/27/2020    Depression Screening PHQ  07/22/2020    Diabetic Foot Exam  07/22/2020    URINE MICROALBUMIN  11/27/2020    BMI: Adult  12/26/2020    Pneumococcal Vaccine: 65+ Years (1 of 2 - PCV13) 01/16/2025    Hepatitis C Screening  Completed    HIB Vaccine  Aged Out    IPV Vaccine  Aged Out    Hepatitis A Vaccine  Aged Out    Meningococcal ACWY Vaccine  Aged Out    HPV Vaccine  Aged [employer] History   Administered Date(s) Administered    Influenza, recombinant, quadrivalent,injectable, preservative free 10/23/2018         Ab Cole MD  12/26/2019,10:21 AM

## 2020-01-06 DIAGNOSIS — E11.42 TYPE 2 DIABETES MELLITUS WITH DIABETIC POLYNEUROPATHY, WITHOUT LONG-TERM CURRENT USE OF INSULIN (HCC): Primary | ICD-10-CM

## 2020-01-21 DIAGNOSIS — E11.42 TYPE 2 DIABETES MELLITUS WITH DIABETIC POLYNEUROPATHY, WITHOUT LONG-TERM CURRENT USE OF INSULIN (HCC): ICD-10-CM

## 2020-02-19 DIAGNOSIS — E11.42 TYPE 2 DIABETES MELLITUS WITH DIABETIC POLYNEUROPATHY, WITHOUT LONG-TERM CURRENT USE OF INSULIN (HCC): ICD-10-CM

## 2020-02-19 DIAGNOSIS — E78.2 MIXED HYPERLIPIDEMIA: ICD-10-CM

## 2020-02-19 DIAGNOSIS — K21.9 GERD WITHOUT ESOPHAGITIS: ICD-10-CM

## 2020-02-19 RX ORDER — OMEPRAZOLE 20 MG/1
CAPSULE, DELAYED RELEASE ORAL
Qty: 30 CAPSULE | Refills: 1 | Status: SHIPPED | OUTPATIENT
Start: 2020-02-19 | End: 2020-04-24

## 2020-02-19 RX ORDER — FENOFIBRATE 145 MG/1
TABLET, COATED ORAL
Qty: 30 TABLET | Refills: 1 | Status: SHIPPED | OUTPATIENT
Start: 2020-02-19 | End: 2020-04-24

## 2020-02-19 RX ORDER — SITAGLIPTIN 100 MG/1
TABLET, FILM COATED ORAL
Qty: 30 TABLET | Refills: 1 | Status: SHIPPED | OUTPATIENT
Start: 2020-02-19 | End: 2020-04-24

## 2020-02-19 RX ORDER — ATORVASTATIN CALCIUM 20 MG/1
TABLET, FILM COATED ORAL
Qty: 30 TABLET | Refills: 1 | Status: SHIPPED | OUTPATIENT
Start: 2020-02-19 | End: 2020-04-24

## 2020-02-19 RX ORDER — GLIPIZIDE 10 MG/1
TABLET, FILM COATED, EXTENDED RELEASE ORAL
Qty: 30 TABLET | Refills: 1 | Status: SHIPPED | OUTPATIENT
Start: 2020-02-19 | End: 2020-04-24

## 2020-03-23 DIAGNOSIS — E11.42 TYPE 2 DIABETES MELLITUS WITH DIABETIC POLYNEUROPATHY, WITHOUT LONG-TERM CURRENT USE OF INSULIN (HCC): ICD-10-CM

## 2020-04-24 DIAGNOSIS — E78.2 MIXED HYPERLIPIDEMIA: ICD-10-CM

## 2020-04-24 DIAGNOSIS — I10 ESSENTIAL HYPERTENSION: ICD-10-CM

## 2020-04-24 DIAGNOSIS — E11.42 TYPE 2 DIABETES MELLITUS WITH DIABETIC POLYNEUROPATHY, WITHOUT LONG-TERM CURRENT USE OF INSULIN (HCC): ICD-10-CM

## 2020-04-24 DIAGNOSIS — K21.9 GERD WITHOUT ESOPHAGITIS: ICD-10-CM

## 2020-04-24 RX ORDER — ATORVASTATIN CALCIUM 20 MG/1
TABLET, FILM COATED ORAL
Qty: 30 TABLET | Refills: 0 | Status: SHIPPED | OUTPATIENT
Start: 2020-04-24 | End: 2020-05-22

## 2020-04-24 RX ORDER — SITAGLIPTIN 100 MG/1
TABLET, FILM COATED ORAL
Qty: 30 TABLET | Refills: 0 | Status: SHIPPED | OUTPATIENT
Start: 2020-04-24 | End: 2020-05-22

## 2020-04-24 RX ORDER — METOPROLOL SUCCINATE 50 MG/1
TABLET, EXTENDED RELEASE ORAL
Qty: 90 TABLET | Refills: 0 | Status: SHIPPED | OUTPATIENT
Start: 2020-04-24 | End: 2020-07-22

## 2020-04-24 RX ORDER — OMEPRAZOLE 20 MG/1
CAPSULE, DELAYED RELEASE ORAL
Qty: 30 CAPSULE | Refills: 0 | Status: SHIPPED | OUTPATIENT
Start: 2020-04-24 | End: 2020-05-22

## 2020-04-24 RX ORDER — GLIPIZIDE 10 MG/1
TABLET, FILM COATED, EXTENDED RELEASE ORAL
Qty: 30 TABLET | Refills: 0 | Status: SHIPPED | OUTPATIENT
Start: 2020-04-24 | End: 2020-05-22

## 2020-04-24 RX ORDER — FENOFIBRATE 145 MG/1
TABLET, COATED ORAL
Qty: 30 TABLET | Refills: 0 | Status: SHIPPED | OUTPATIENT
Start: 2020-04-24 | End: 2020-05-22

## 2020-05-22 DIAGNOSIS — E78.2 MIXED HYPERLIPIDEMIA: ICD-10-CM

## 2020-05-22 DIAGNOSIS — E11.42 TYPE 2 DIABETES MELLITUS WITH DIABETIC POLYNEUROPATHY, WITHOUT LONG-TERM CURRENT USE OF INSULIN (HCC): ICD-10-CM

## 2020-05-22 DIAGNOSIS — K21.9 GERD WITHOUT ESOPHAGITIS: ICD-10-CM

## 2020-05-22 RX ORDER — FENOFIBRATE 145 MG/1
TABLET, COATED ORAL
Qty: 30 TABLET | Refills: 0 | Status: SHIPPED | OUTPATIENT
Start: 2020-05-22 | End: 2020-06-24

## 2020-05-22 RX ORDER — SITAGLIPTIN 100 MG/1
TABLET, FILM COATED ORAL
Qty: 30 TABLET | Refills: 0 | Status: SHIPPED | OUTPATIENT
Start: 2020-05-22 | End: 2020-06-24

## 2020-05-22 RX ORDER — ATORVASTATIN CALCIUM 20 MG/1
TABLET, FILM COATED ORAL
Qty: 30 TABLET | Refills: 0 | Status: SHIPPED | OUTPATIENT
Start: 2020-05-22 | End: 2020-06-24

## 2020-05-22 RX ORDER — OMEPRAZOLE 20 MG/1
CAPSULE, DELAYED RELEASE ORAL
Qty: 30 CAPSULE | Refills: 0 | Status: SHIPPED | OUTPATIENT
Start: 2020-05-22 | End: 2020-06-24

## 2020-05-22 RX ORDER — GLIPIZIDE 10 MG/1
TABLET, FILM COATED, EXTENDED RELEASE ORAL
Qty: 30 TABLET | Refills: 0 | Status: SHIPPED | OUTPATIENT
Start: 2020-05-22 | End: 2020-06-24

## 2020-06-24 DIAGNOSIS — E78.2 MIXED HYPERLIPIDEMIA: ICD-10-CM

## 2020-06-24 DIAGNOSIS — E11.42 TYPE 2 DIABETES MELLITUS WITH DIABETIC POLYNEUROPATHY, WITHOUT LONG-TERM CURRENT USE OF INSULIN (HCC): ICD-10-CM

## 2020-06-24 DIAGNOSIS — K21.9 GERD WITHOUT ESOPHAGITIS: ICD-10-CM

## 2020-06-24 RX ORDER — OMEPRAZOLE 20 MG/1
CAPSULE, DELAYED RELEASE ORAL
Qty: 30 CAPSULE | Refills: 0 | Status: SHIPPED | OUTPATIENT
Start: 2020-06-24 | End: 2020-07-22

## 2020-06-24 RX ORDER — GLIPIZIDE 10 MG/1
TABLET, FILM COATED, EXTENDED RELEASE ORAL
Qty: 30 TABLET | Refills: 0 | Status: SHIPPED | OUTPATIENT
Start: 2020-06-24 | End: 2020-07-22

## 2020-06-24 RX ORDER — SITAGLIPTIN 100 MG/1
TABLET, FILM COATED ORAL
Qty: 30 TABLET | Refills: 0 | Status: SHIPPED | OUTPATIENT
Start: 2020-06-24 | End: 2020-07-22

## 2020-06-24 RX ORDER — FENOFIBRATE 145 MG/1
TABLET, COATED ORAL
Qty: 30 TABLET | Refills: 0 | Status: SHIPPED | OUTPATIENT
Start: 2020-06-24 | End: 2020-07-22

## 2020-06-24 RX ORDER — ATORVASTATIN CALCIUM 20 MG/1
TABLET, FILM COATED ORAL
Qty: 30 TABLET | Refills: 0 | Status: SHIPPED | OUTPATIENT
Start: 2020-06-24 | End: 2020-07-22

## 2020-07-22 DIAGNOSIS — I10 ESSENTIAL HYPERTENSION: ICD-10-CM

## 2020-07-22 DIAGNOSIS — K21.9 GERD WITHOUT ESOPHAGITIS: ICD-10-CM

## 2020-07-22 DIAGNOSIS — E11.42 TYPE 2 DIABETES MELLITUS WITH DIABETIC POLYNEUROPATHY, WITHOUT LONG-TERM CURRENT USE OF INSULIN (HCC): ICD-10-CM

## 2020-07-22 DIAGNOSIS — E78.2 MIXED HYPERLIPIDEMIA: ICD-10-CM

## 2020-07-22 RX ORDER — SITAGLIPTIN 100 MG/1
TABLET, FILM COATED ORAL
Qty: 30 TABLET | Refills: 0 | Status: SHIPPED | OUTPATIENT
Start: 2020-07-22 | End: 2020-07-24

## 2020-07-22 RX ORDER — METOPROLOL SUCCINATE 50 MG/1
TABLET, EXTENDED RELEASE ORAL
Qty: 90 TABLET | Refills: 0 | Status: SHIPPED | OUTPATIENT
Start: 2020-07-22 | End: 2020-10-10

## 2020-07-22 RX ORDER — ATORVASTATIN CALCIUM 20 MG/1
TABLET, FILM COATED ORAL
Qty: 30 TABLET | Refills: 0 | Status: SHIPPED | OUTPATIENT
Start: 2020-07-22 | End: 2020-08-17

## 2020-07-22 RX ORDER — FENOFIBRATE 145 MG/1
TABLET, COATED ORAL
Qty: 30 TABLET | Refills: 0 | Status: SHIPPED | OUTPATIENT
Start: 2020-07-22 | End: 2020-08-17

## 2020-07-22 RX ORDER — OMEPRAZOLE 20 MG/1
CAPSULE, DELAYED RELEASE ORAL
Qty: 30 CAPSULE | Refills: 0 | Status: SHIPPED | OUTPATIENT
Start: 2020-07-22 | End: 2020-08-18

## 2020-07-22 RX ORDER — GLIPIZIDE 10 MG/1
TABLET, FILM COATED, EXTENDED RELEASE ORAL
Qty: 30 TABLET | Refills: 0 | Status: SHIPPED | OUTPATIENT
Start: 2020-07-22 | End: 2020-07-24 | Stop reason: SDUPTHER

## 2020-07-23 ENCOUNTER — TELEPHONE (OUTPATIENT)
Dept: INTERNAL MEDICINE CLINIC | Facility: CLINIC | Age: 60
End: 2020-07-23

## 2020-07-23 NOTE — TELEPHONE ENCOUNTER
Dr prescribed JANUVIA 100 mg QD, which requires prior Auth for has [place] Medicaid  Gino Metz  Please call [facility] / [phone]    Her ID # [id number]

## 2020-07-24 DIAGNOSIS — E11.42 TYPE 2 DIABETES MELLITUS WITH DIABETIC POLYNEUROPATHY, WITHOUT LONG-TERM CURRENT USE OF INSULIN (HCC): ICD-10-CM

## 2020-07-24 RX ORDER — GLIPIZIDE 10 MG/1
10 TABLET, FILM COATED, EXTENDED RELEASE ORAL 2 TIMES DAILY
Qty: 180 TABLET | Refills: 1 | Status: SHIPPED | OUTPATIENT
Start: 2020-07-24 | End: 2020-10-27

## 2020-07-24 NOTE — TELEPHONE ENCOUNTER
Prior auth for Januvia in process, however the alternatives are:    glimiperide   Glipizide-metformin  piogltazone    FYI just in case the request gets denied

## 2020-08-08 DIAGNOSIS — E11.42 TYPE 2 DIABETES MELLITUS WITH DIABETIC POLYNEUROPATHY, WITH LONG-TERM CURRENT USE OF INSULIN (HCC): ICD-10-CM

## 2020-08-08 DIAGNOSIS — Z79.4 TYPE 2 DIABETES MELLITUS WITH DIABETIC POLYNEUROPATHY, WITH LONG-TERM CURRENT USE OF INSULIN (HCC): ICD-10-CM

## 2020-08-10 RX ORDER — INSULIN GLARGINE 100 [IU]/ML
INJECTION, SOLUTION SUBCUTANEOUS
Qty: 15 ML | Refills: 0 | Status: SHIPPED | OUTPATIENT
Start: 2020-08-10 | End: 2020-10-06

## 2020-08-17 DIAGNOSIS — E78.2 MIXED HYPERLIPIDEMIA: ICD-10-CM

## 2020-08-17 DIAGNOSIS — E11.42 TYPE 2 DIABETES MELLITUS WITH DIABETIC POLYNEUROPATHY, WITHOUT LONG-TERM CURRENT USE OF INSULIN (HCC): ICD-10-CM

## 2020-08-17 DIAGNOSIS — K21.9 GERD WITHOUT ESOPHAGITIS: ICD-10-CM

## 2020-08-17 RX ORDER — ATORVASTATIN CALCIUM 20 MG/1
TABLET, FILM COATED ORAL
Qty: 30 TABLET | Refills: 0 | Status: SHIPPED | OUTPATIENT
Start: 2020-08-17 | End: 2020-09-14

## 2020-08-17 RX ORDER — FENOFIBRATE 145 MG/1
TABLET, COATED ORAL
Qty: 30 TABLET | Refills: 0 | Status: SHIPPED | OUTPATIENT
Start: 2020-08-17 | End: 2020-09-14

## 2020-08-18 RX ORDER — OMEPRAZOLE 20 MG/1
CAPSULE, DELAYED RELEASE ORAL
Qty: 30 CAPSULE | Refills: 0 | Status: SHIPPED | OUTPATIENT
Start: 2020-08-18 | End: 2020-09-21

## 2020-08-29 ENCOUNTER — APPOINTMENT (OUTPATIENT)
Dept: LAB | Facility: CLINIC | Age: 60
End: 2020-08-29
Payer: COMMERCIAL

## 2020-08-29 DIAGNOSIS — E11.42 TYPE 2 DIABETES MELLITUS WITH DIABETIC POLYNEUROPATHY, WITH LONG-TERM CURRENT USE OF INSULIN (HCC): ICD-10-CM

## 2020-08-29 DIAGNOSIS — E78.2 MIXED HYPERLIPIDEMIA: ICD-10-CM

## 2020-08-29 DIAGNOSIS — Z79.4 TYPE 2 DIABETES MELLITUS WITH DIABETIC POLYNEUROPATHY, WITH LONG-TERM CURRENT USE OF INSULIN (HCC): ICD-10-CM

## 2020-08-29 LAB
ALBUMIN SERPL BCP-MCNC: 3.1 G/DL (ref 3.5–5)
ALP SERPL-CCNC: 55 U/L (ref 46–116)
ALT SERPL W P-5'-P-CCNC: 20 U/L (ref 12–78)
ANION GAP SERPL CALCULATED.3IONS-SCNC: 6 MMOL/L (ref 4–13)
AST SERPL W P-5'-P-CCNC: 18 U/L (ref 5–45)
BASOPHILS # BLD AUTO: 0.04 THOUSANDS/ΜL (ref 0–0.1)
BASOPHILS NFR BLD AUTO: 0 % (ref 0–1)
BILIRUB SERPL-MCNC: 0.21 MG/DL (ref 0.2–1)
BUN SERPL-MCNC: 52 MG/DL (ref 5–25)
CALCIUM SERPL-MCNC: 8.5 MG/DL (ref 8.3–10.1)
CHLORIDE SERPL-SCNC: 113 MMOL/L (ref 100–108)
CHOLEST SERPL-MCNC: 187 MG/DL (ref 50–200)
CO2 SERPL-SCNC: 24 MMOL/L (ref 21–32)
CREAT SERPL-MCNC: 2.38 MG/DL (ref 0.6–1.3)
EOSINOPHIL # BLD AUTO: 0.13 THOUSAND/ΜL (ref 0–0.61)
EOSINOPHIL NFR BLD AUTO: 1 % (ref 0–6)
ERYTHROCYTE [DISTWIDTH] IN BLOOD BY AUTOMATED COUNT: 14.1 % (ref 11.6–15.1)
EST. AVERAGE GLUCOSE BLD GHB EST-MCNC: 134 MG/DL
GFR SERPL CREATININE-BSD FRML MDRD: 22 ML/MIN/1.73SQ M
GLUCOSE P FAST SERPL-MCNC: 70 MG/DL (ref 65–99)
HBA1C MFR BLD: 6.3 %
HCT VFR BLD AUTO: 31.9 % (ref 34.8–46.1)
HDLC SERPL-MCNC: 31 MG/DL
HGB BLD-MCNC: 9.6 G/DL (ref 11.5–15.4)
IMM GRANULOCYTES # BLD AUTO: 0.07 THOUSAND/UL (ref 0–0.2)
IMM GRANULOCYTES NFR BLD AUTO: 1 % (ref 0–2)
LDLC SERPL CALC-MCNC: 107 MG/DL (ref 0–100)
LYMPHOCYTES # BLD AUTO: 1.6 THOUSANDS/ΜL (ref 0.6–4.47)
LYMPHOCYTES NFR BLD AUTO: 14 % (ref 14–44)
MCH RBC QN AUTO: 26.7 PG (ref 26.8–34.3)
MCHC RBC AUTO-ENTMCNC: 30.1 G/DL (ref 31.4–37.4)
MCV RBC AUTO: 89 FL (ref 82–98)
MONOCYTES # BLD AUTO: 0.67 THOUSAND/ΜL (ref 0.17–1.22)
MONOCYTES NFR BLD AUTO: 6 % (ref 4–12)
NEUTROPHILS # BLD AUTO: 8.92 THOUSANDS/ΜL (ref 1.85–7.62)
NEUTS SEG NFR BLD AUTO: 78 % (ref 43–75)
NONHDLC SERPL-MCNC: 156 MG/DL
NRBC BLD AUTO-RTO: 0 /100 WBCS
PLATELET # BLD AUTO: 528 THOUSANDS/UL (ref 149–390)
PMV BLD AUTO: 10.5 FL (ref 8.9–12.7)
POTASSIUM SERPL-SCNC: 3.9 MMOL/L (ref 3.5–5.3)
PROT SERPL-MCNC: 7.3 G/DL (ref 6.4–8.2)
RBC # BLD AUTO: 3.59 MILLION/UL (ref 3.81–5.12)
SODIUM SERPL-SCNC: 143 MMOL/L (ref 136–145)
TRIGL SERPL-MCNC: 245 MG/DL
TSH SERPL DL<=0.05 MIU/L-ACNC: 1.77 UIU/ML (ref 0.36–3.74)
WBC # BLD AUTO: 11.43 THOUSAND/UL (ref 4.31–10.16)

## 2020-08-29 PROCEDURE — 84443 ASSAY THYROID STIM HORMONE: CPT

## 2020-08-29 PROCEDURE — 36415 COLL VENOUS BLD VENIPUNCTURE: CPT

## 2020-08-29 PROCEDURE — 80053 COMPREHEN METABOLIC PANEL: CPT

## 2020-08-29 PROCEDURE — 85025 COMPLETE CBC W/AUTO DIFF WBC: CPT

## 2020-08-29 PROCEDURE — 80061 LIPID PANEL: CPT

## 2020-08-29 PROCEDURE — 83036 HEMOGLOBIN GLYCOSYLATED A1C: CPT

## 2020-09-02 ENCOUNTER — OFFICE VISIT (OUTPATIENT)
Dept: INTERNAL MEDICINE CLINIC | Facility: CLINIC | Age: 60
End: 2020-09-02
Payer: COMMERCIAL

## 2020-09-02 VITALS
SYSTOLIC BLOOD PRESSURE: 146 MMHG | WEIGHT: 162.8 LBS | OXYGEN SATURATION: 98 % | TEMPERATURE: 98.9 F | DIASTOLIC BLOOD PRESSURE: 82 MMHG | HEART RATE: 88 BPM | HEIGHT: 64 IN | BODY MASS INDEX: 27.79 KG/M2

## 2020-09-02 DIAGNOSIS — E78.2 MIXED HYPERLIPIDEMIA: ICD-10-CM

## 2020-09-02 DIAGNOSIS — E66.3 OVERWEIGHT: ICD-10-CM

## 2020-09-02 DIAGNOSIS — I10 ESSENTIAL HYPERTENSION: ICD-10-CM

## 2020-09-02 DIAGNOSIS — I50.22 CHRONIC SYSTOLIC CONGESTIVE HEART FAILURE (HCC): ICD-10-CM

## 2020-09-02 DIAGNOSIS — N18.4 CKD (CHRONIC KIDNEY DISEASE) STAGE 4, GFR 15-29 ML/MIN (HCC): ICD-10-CM

## 2020-09-02 DIAGNOSIS — E11.42 TYPE 2 DIABETES MELLITUS WITH DIABETIC POLYNEUROPATHY, WITH LONG-TERM CURRENT USE OF INSULIN (HCC): Primary | ICD-10-CM

## 2020-09-02 DIAGNOSIS — Z79.4 TYPE 2 DIABETES MELLITUS WITH DIABETIC POLYNEUROPATHY, WITH LONG-TERM CURRENT USE OF INSULIN (HCC): Primary | ICD-10-CM

## 2020-09-02 PROCEDURE — 1036F TOBACCO NON-USER: CPT | Performed by: INTERNAL MEDICINE

## 2020-09-02 PROCEDURE — 3066F NEPHROPATHY DOC TX: CPT | Performed by: INTERNAL MEDICINE

## 2020-09-02 PROCEDURE — 99214 OFFICE O/P EST MOD 30 MIN: CPT | Performed by: INTERNAL MEDICINE

## 2020-09-02 PROCEDURE — 3077F SYST BP >= 140 MM HG: CPT | Performed by: INTERNAL MEDICINE

## 2020-09-02 PROCEDURE — 3079F DIAST BP 80-89 MM HG: CPT | Performed by: INTERNAL MEDICINE

## 2020-09-02 RX ORDER — SITAGLIPTIN 100 MG/1
TABLET, FILM COATED ORAL
COMMUNITY
Start: 2020-08-03 | End: 2020-09-08

## 2020-09-02 NOTE — PROGRESS NOTES
INTERNAL MEDICINE OFFICE VISIT  [facility]  [address], [facility], [address] [facility]  Tel: [phone]      NAME: Lorena Morrow  AGE: 61 y o  SEX: female  : 1960   MRN: [id number]    DATE: 2020  TIME: 6:59 PM      Assessment and Plan:  1  Type 2 diabetes mellitus with diabetic polyneuropathy, with long-term current use of insulin (HCC)   patient  was told to discontinue the metformin in view of the kidney disease and to continue the rest of the medications and the insulin  If her blood glucose levels start to increase, she will let me know  I will recheck labs in 4 months       - CBC and differential; Future  - Hemoglobin A1C; Future  - Comprehensive metabolic panel; Future  - TSH, 3rd generation; Future  - Ambulatory Referral to Ophthalmology; Future    2  Essential hypertension  Continue a low-salt diet    3  Mixed hyperlipidemia   continue atorvastatin  - Lipid panel; Future    4  Chronic systolic congestive heart failure (HCC)   continue furosemide    5  CKD (chronic kidney disease) stage 4, GFR 15-29 ml/min [facility])   follow-up with nephrology  - Ambulatory referral to Nephrology; Future    6  Overweight  BMI Counseling: Body mass index is 27 94 kg/m²  The BMI is above normal  Nutrition recommendations include decreasing portion sizes, encouraging healthy choices of fruits and vegetables and moderation in carbohydrate intake  Exercise recommendations include moderate physical activity 150 minutes/week  No pharmacotherapy was ordered  - Counseling Documentation: patient was counseled regarding: diagnostic results, instructions for management, risk factor reductions, prognosis, patient and family education, risks and benefits of treatment options and importance of compliance with treatment  - Medication Side Effects: Adverse side effects of medications were reviewed with the patient/guardian today        Return for follow up visit in  4 months or earlier, if needed        Chief Complaint:  Chief Complaint   Patient presents with    Follow-up         History of Present Illness:    type 2 diabetes -very well controlled with a hemoglobin A1c of 6 3 after she was started on insulin   Hypertension -well controlled   Hyperlipidemia -takes atorvastatin regularly  Congestive heart failure -well controlled on the furosemide  Chronic kidney disease -creatinine is high but stable   Overweight -she was told to try to lose weight      Active Problem List:  Patient Active Problem List   Diagnosis    Arthropathy    Carpal tunnel syndrome    Mixed hyperlipidemia    Essential hypertension    Tachycardia    Type 2 diabetes mellitus with diabetic polyneuropathy, with long-term current use of insulin ([facility])    Polyneuropathy associated with underlying disease ([facility] [address] )    GERD without esophagitis    Chronic idiopathic constipation    Overweight    CKD (chronic kidney disease) stage 4, GFR 15-29 ml/min ([facility])    Chronic systolic congestive heart failure ([facility] )    Nephrotic range proteinuria         Past Medical History:  Past Medical History:   Diagnosis Date    Abnormal CBC 6/12/2018    Anxiety     Benign hypertension     Cigarette nicotine dependence without complication 02/71/3927    CTS (carpal tunnel syndrome)     Essential hypertriglyceridemia     Hyperlipidemia     last assessed 12/12/2017    Sebaceous cyst     [facility]     last assessed 12/12/2017         Past Surgical History:  Past Surgical History:   Procedure Laterality Date    CHOLECYSTECTOMY LAPAROSCOPIC      KNEE SURGERY Right          Family History:  Family History   Problem Relation Age of Onset    Other Mother         acute subdural hematoma    Coronary artery disease Mother         CABG    Lung cancer Father     Gout Brother     Lung cancer Brother          Social History:  Social History     Socioeconomic History    Marital status: /Civil Union Spouse name: None    Number of children: None    Years of education: None    Highest education level: None   Occupational History    None   Social Needs    Financial resource strain: None    Food insecurity     Worry: None     Inability: None    Transportation needs     Medical: None     Non-medical: None   Tobacco Use    Smoking status: Former Smoker     Packs/day: 0 50     Years: 15 00     Pack years: 7 50     Types: Cigarettes     Last attempt to quit: 2019     Years since quittin 6    Smokeless tobacco: Never Used   Substance and Sexual Activity    Alcohol use: No    Drug use: No    Sexual activity: Yes     Partners: Male   Lifestyle    Physical activity     Days per week: 4 days     Minutes per session: 40 min    Stress: Not at all   Relationships    Social connections     Talks on phone: None     Gets together: None     Attends Je[employer]'s witness service: None     Active member of club or organization: None     Attends meetings of clubs or organizations: None     Relationship status: None    Intimate partner violence     Fear of current or ex partner: None     Emotionally abused: None     Physically abused: None     Forced sexual activity: None   Other Topics Concern    None   Social History Narrative    None         Allergies:   Allergies   Allergen Reactions    Wound Dressing Adhesive Rash         Medications:    Current Outpatient Medications:     aspirin 81 MG tablet, Take 1 tablet by mouth daily, Disp: , Rfl:     atorvastatin (LIPITOR) 20 mg tablet, TAKE ONE TABLET BY MOUTH EVERY DAY , Disp: 30 tablet, Rfl: 0    fenofibrate (TRICOR) 145 mg tablet, TAKE ONE TABLET BY MOUTH EVERY DAY , Disp: 30 tablet, Rfl: 0    furosemide (LASIX) 40 mg tablet, 2 (two) times a day Take 2 tablets in the morning and 1 tablet at night, Disp: , Rfl:     glipiZIDE (GLUCOTROL XL) 10 mg 24 hr tablet, Take 1 tablet (10 mg total) by mouth 2 (two) times a day, Disp: 180 tablet, Rfl: 1    Insulin Pen Needle (B-D ULTRAFINE III SHORT PEN) 31G X 8 MM MISC, by Does not apply route daily, Disp: 100 each, Rfl: 3    Lantus SoloStar 100 units/mL injection pen, inject 30 units under the skin once daily , Disp: 15 mL, Rfl: 0    metoprolol succinate (TOPROL-XL) 50 mg 24 hr tablet, TAKE ONE TABLET BY MOUTH EVERY DAY , Disp: 90 tablet, Rfl: 0    omeprazole (PriLOSEC) 20 mg delayed release capsule, TAKE ONE CAPSULE BY MOUTH EVERY DAY , Disp: 30 capsule, Rfl: 0    Potassium (POTASSIMIN PO), Take by mouth daily, Disp: , Rfl:     Aspirin Buf,CaCarb-MgCarb-MgO, 81 MG TABS, Take 81 mg by mouth, Disp: , Rfl:     Januvia 100 MG tablet, , Disp: , Rfl:       The following portions of the patient's history were reviewed and updated as appropriate: past medical history, past surgical history, family history, social history, allergies, current medications and active problem list       Review of Systems:  Constitutional: Denies fever, chills, weight gain, weight loss, fatigue  Eyes: Denies eye redness, eye discharge, double vision, change in visual acuity  ENT: Denies hearing loss, tinnitus, sneezing, nasal congestion, nasal discharge, sore throat   Respiratory: Denies cough, expectoration, hemoptysis, shortness of breath, wheezing  Cardiovascular: Denies chest pain, palpitations, lower extremity swelling, orthopnea, PND  Gastrointestinal: Denies abdominal pain, heartburn, nausea, vomiting, hematemesis, diarrhea, bloody stools  Genito-Urinary: Denies dysuria, frequency, difficulty in micturition, nocturia, incontinence  Musculoskeletal: Denies back pain, joint pain, muscle pain  Neurologic: Denies confusion, lightheadedness, syncope, headache, focal weakness, sensory changes, seizures  Endocrine: Denies polyuria, polydipsia, temperature intolerance  Allergy and Immunology: Denies hives, insect bite sensitivity  Hematological and Lymphatic: Denies bleeding problems, swollen glands   Psychological: Denies depression, suicidal ideation, anxiety, panic, mood swings  Dermatological: Denies pruritus, rash, skin lesion changes      Vitals:  Vitals:    09/02/20 1811   BP: 146/82   Pulse: 88   Temp: 98 9 °F (37 2 °C)   SpO2: 98%       Body mass index is 27 94 kg/m²  Weight (last 2 days)     Date/Time   Weight    09/02/20 1811   73 8 (162 8)                Physical Examination:  General: Patient is not in acute distress  Awake, alert, responding to commands  No weight gain or loss  Head: Normocephalic  Atraumatic  Eyes: Conjunctiva and lids with no swelling, erythema or discharge  Both pupils normal sized, round and reactive to light  Sclera nonicteric  ENT: External examination of nose and ear normal  Otoscopic examination shows translucent tympanic membranes with patent canals without erythema  Oropharynx moist with no erythema, edema, exudate or lesions  Neck: Supple  JVP not raised  Trachea midline  No masses  No thyromegaly  Lungs: No signs of increased work of breathing or respiratory distress  Bilateral bronchovascular breath sounds with no crackles or rhonchi  Chest wall: No tenderness  Cardiovascular: Normal PMI  No thrills  Regular rate and rhythm  S1 and S2 normal  No murmur, rub or gallop  Gastrointestinal: Abdomen soft, nontender  No guarding or rigidity  Liver and spleen not palpable  Bowel sounds present  Neurologic: Cranial nerves II-XII intact  Cortical functions normal  Motor system - Reflexes 2+ and symmetrical  Sensations normal  Musculoskeletal: Gait normal  No joint tenderness  Integumentary: Skin normal with no rash or lesions  Lymphatic: No palpable lymph nodes in neck, axilla or groin  Extremities: No clubbing, cyanosis, edema or varicosities  Psychological: Judgement and insight normal  Mood and affect normal    Patient's shoes and socks removed  Right Foot/Ankle   Right Foot Inspection  Skin Exam: skin normal and skin intact no dry skin, no warmth, no callus, no erythema, no maceration, no abnormal color, no pre-ulcer, no ulcer and no callus                          Toe Exam: ROM and strength within normal limits  Sensory     Proprioception: diminished and intact   Monofilament testing: intact  Vascular  Capillary refills: < 3 seconds  The right DP pulse is 2+  The right PT pulse is 2+  Left Foot/Ankle  Left Foot Inspection  Skin Exam: skin normal and skin intactno dry skin, no warmth, no erythema, no maceration, normal color, no pre-ulcer, no ulcer and no callus                         Toe Exam: ROM and strength within normal limits                   Sensory     Proprioception: intact  Monofilament: intact  Vascular  Capillary refills: < 3 seconds  The left DP pulse is 2+  The left PT pulse is 2+  Assign Risk Category:  No deformity present; No loss of protective sensation;  No weak pulses       Risk: 0        Laboratory Results:  CBC with diff:   Lab Results   Component Value Date    WBC 11 43 (H) 08/29/2020    WBC 9 0 04/21/2015    RBC 3 59 (L) 08/29/2020    RBC 3 65 (L) 04/21/2015    HGB 9 6 (L) 08/29/2020    HGB 10 8 (L) 04/21/2015    HCT 31 9 (L) 08/29/2020    HCT 32 2 (L) 04/21/2015    MCV 89 08/29/2020    MCV 88 04/21/2015    MCH 26 7 (L) 08/29/2020    MCH 29 6 04/21/2015    RDW 14 1 08/29/2020    RDW 10 7 (L) 04/21/2015     (H) 08/29/2020     (H) 04/21/2015       CMP:  Lab Results   Component Value Date    CREATININE 2 38 (H) 08/29/2020    CREATININE 1 2 (H) 04/21/2015    BUN 52 (H) 08/29/2020    BUN 30 (H) 04/21/2015     04/21/2015    K 3 9 08/29/2020    K 4 8 04/21/2015     (H) 08/29/2020     04/21/2015    CO2 24 08/29/2020    CO2 24 9 04/21/2015    GLUCOSE 293 (H) 04/21/2015    PROT 6 8 04/21/2015    ALKPHOS 55 08/29/2020    ALKPHOS 45 (L) 04/21/2015    ALT 20 08/29/2020    ALT 33 04/21/2015    AST 18 08/29/2020    AST 16 04/21/2015       Lab Results   Component Value Date    HGBA1C 6 3 (H) 08/29/2020    HGBA1C 11 2 (H) 04/21/2015    MG 2 1 10/31/2019    PHOS 3 2 11/27/2019       No results found for: TROPONINI, CKMB, CKTOTAL    Lipid Profile:   Lab Results   Component Value Date    CHOL 318 04/21/2015     Lab Results   Component Value Date    HDL 31 (L) 08/29/2020    HDL 39 (L) 11/27/2019     Lab Results   Component Value Date    LDLCALC 107 (H) 08/29/2020    LDLCALC 103 (H) 11/27/2019     Lab Results   Component Value Date    TRIG 245 (H) 08/29/2020    TRIG 400 (H) 11/27/2019       Imaging Results:  RADIOLOGY RESULTS  Ordered by an unspecified provider         Health Maintenance:  Health Maintenance   Topic Date Due    MAMMOGRAM  1960    Pneumococcal Vaccine: Pediatrics (0 to 5 Years) and At-Risk Patients (6 to 59 Years) (1 of 1 - PPSV23) 01/16/1966    DM Eye Exam  01/16/1970    HIV Screening  01/16/1975    Annual Physical  01/16/1978    DTaP,Tdap,and Td Vaccines (1 - Tdap) 01/16/1981    Cervical Cancer Screening  01/16/1981    Colorectal Cancer Screening  01/16/2010    BMI: Followup Plan  03/05/2020    Influenza Vaccine  07/01/2020    Diabetic Foot Exam  07/22/2020    URINE MICROALBUMIN  11/27/2020    Depression Screening PHQ  12/26/2020    HEMOGLOBIN A1C  02/28/2021    BMI: Adult  09/02/2021    Hepatitis C Screening  Completed    HIB Vaccine  Aged Out    Hepatitis B Vaccine  Aged Out    IPV Vaccine  Aged Out    Hepatitis A Vaccine  Aged Out    Meningococcal ACWY Vaccine  Aged Out    HPV Vaccine  Aged [employer] History   Administered Date(s) Administered    Influenza, recombinant, quadrivalent,injectable, preservative free 10/23/2018         Sheyla Amaro MD  9/2/2020,6:59 PM

## 2020-09-06 DIAGNOSIS — E11.42 TYPE 2 DIABETES MELLITUS WITH DIABETIC POLYNEUROPATHY, WITH LONG-TERM CURRENT USE OF INSULIN (HCC): Primary | ICD-10-CM

## 2020-09-06 DIAGNOSIS — Z79.4 TYPE 2 DIABETES MELLITUS WITH DIABETIC POLYNEUROPATHY, WITH LONG-TERM CURRENT USE OF INSULIN (HCC): Primary | ICD-10-CM

## 2020-09-08 RX ORDER — SITAGLIPTIN 100 MG/1
TABLET, FILM COATED ORAL
Qty: 30 TABLET | Refills: 0 | Status: SHIPPED | OUTPATIENT
Start: 2020-09-08 | End: 2020-10-10

## 2020-09-12 DIAGNOSIS — E78.2 MIXED HYPERLIPIDEMIA: ICD-10-CM

## 2020-09-12 DIAGNOSIS — E11.42 TYPE 2 DIABETES MELLITUS WITH DIABETIC POLYNEUROPATHY, WITHOUT LONG-TERM CURRENT USE OF INSULIN (HCC): ICD-10-CM

## 2020-09-14 RX ORDER — FENOFIBRATE 145 MG/1
TABLET, COATED ORAL
Qty: 30 TABLET | Refills: 0 | Status: SHIPPED | OUTPATIENT
Start: 2020-09-14 | End: 2020-10-10

## 2020-09-14 RX ORDER — ATORVASTATIN CALCIUM 20 MG/1
TABLET, FILM COATED ORAL
Qty: 30 TABLET | Refills: 0 | Status: SHIPPED | OUTPATIENT
Start: 2020-09-14 | End: 2020-10-10

## 2020-09-21 DIAGNOSIS — K21.9 GERD WITHOUT ESOPHAGITIS: ICD-10-CM

## 2020-09-21 RX ORDER — OMEPRAZOLE 20 MG/1
CAPSULE, DELAYED RELEASE ORAL
Qty: 30 CAPSULE | Refills: 0 | Status: SHIPPED | OUTPATIENT
Start: 2020-09-21 | End: 2020-10-20

## 2020-10-06 DIAGNOSIS — Z79.4 TYPE 2 DIABETES MELLITUS WITH DIABETIC POLYNEUROPATHY, WITH LONG-TERM CURRENT USE OF INSULIN (HCC): ICD-10-CM

## 2020-10-06 DIAGNOSIS — E11.42 TYPE 2 DIABETES MELLITUS WITH DIABETIC POLYNEUROPATHY, WITH LONG-TERM CURRENT USE OF INSULIN (HCC): ICD-10-CM

## 2020-10-06 RX ORDER — INSULIN GLARGINE 100 [IU]/ML
INJECTION, SOLUTION SUBCUTANEOUS
Qty: 15 ML | Refills: 0 | Status: SHIPPED | OUTPATIENT
Start: 2020-10-06 | End: 2020-11-07

## 2020-10-10 DIAGNOSIS — E78.2 MIXED HYPERLIPIDEMIA: ICD-10-CM

## 2020-10-10 DIAGNOSIS — I10 ESSENTIAL HYPERTENSION: ICD-10-CM

## 2020-10-10 DIAGNOSIS — Z79.4 TYPE 2 DIABETES MELLITUS WITH DIABETIC POLYNEUROPATHY, WITH LONG-TERM CURRENT USE OF INSULIN (HCC): ICD-10-CM

## 2020-10-10 DIAGNOSIS — E11.42 TYPE 2 DIABETES MELLITUS WITH DIABETIC POLYNEUROPATHY, WITH LONG-TERM CURRENT USE OF INSULIN (HCC): ICD-10-CM

## 2020-10-10 RX ORDER — SITAGLIPTIN 100 MG/1
TABLET, FILM COATED ORAL
Qty: 30 TABLET | Refills: 0 | Status: SHIPPED | OUTPATIENT
Start: 2020-10-10 | End: 2020-11-04

## 2020-10-10 RX ORDER — ATORVASTATIN CALCIUM 20 MG/1
TABLET, FILM COATED ORAL
Qty: 30 TABLET | Refills: 0 | Status: SHIPPED | OUTPATIENT
Start: 2020-10-10 | End: 2020-11-04

## 2020-10-10 RX ORDER — FENOFIBRATE 145 MG/1
TABLET, COATED ORAL
Qty: 30 TABLET | Refills: 0 | Status: SHIPPED | OUTPATIENT
Start: 2020-10-10 | End: 2020-11-04

## 2020-10-10 RX ORDER — METOPROLOL SUCCINATE 50 MG/1
TABLET, EXTENDED RELEASE ORAL
Qty: 90 TABLET | Refills: 0 | Status: SHIPPED | OUTPATIENT
Start: 2020-10-10 | End: 2021-01-03

## 2020-10-20 DIAGNOSIS — K21.9 GERD WITHOUT ESOPHAGITIS: ICD-10-CM

## 2020-10-20 RX ORDER — OMEPRAZOLE 20 MG/1
CAPSULE, DELAYED RELEASE ORAL
Qty: 30 CAPSULE | Refills: 0 | Status: SHIPPED | OUTPATIENT
Start: 2020-10-20 | End: 2020-11-16

## 2020-10-27 ENCOUNTER — TELEPHONE (OUTPATIENT)
Dept: INTERNAL MEDICINE CLINIC | Facility: CLINIC | Age: 60
End: 2020-10-27

## 2020-11-04 DIAGNOSIS — E11.42 TYPE 2 DIABETES MELLITUS WITH DIABETIC POLYNEUROPATHY, WITH LONG-TERM CURRENT USE OF INSULIN (HCC): ICD-10-CM

## 2020-11-04 DIAGNOSIS — E78.2 MIXED HYPERLIPIDEMIA: ICD-10-CM

## 2020-11-04 DIAGNOSIS — Z79.4 TYPE 2 DIABETES MELLITUS WITH DIABETIC POLYNEUROPATHY, WITH LONG-TERM CURRENT USE OF INSULIN (HCC): ICD-10-CM

## 2020-11-04 RX ORDER — ATORVASTATIN CALCIUM 20 MG/1
TABLET, FILM COATED ORAL
Qty: 30 TABLET | Refills: 0 | Status: SHIPPED | OUTPATIENT
Start: 2020-11-04 | End: 2020-12-05

## 2020-11-04 RX ORDER — FENOFIBRATE 145 MG/1
TABLET, COATED ORAL
Qty: 30 TABLET | Refills: 0 | Status: SHIPPED | OUTPATIENT
Start: 2020-11-04 | End: 2020-12-05

## 2020-11-04 RX ORDER — SITAGLIPTIN 100 MG/1
TABLET, FILM COATED ORAL
Qty: 30 TABLET | Refills: 0 | Status: SHIPPED | OUTPATIENT
Start: 2020-11-04 | End: 2020-12-05

## 2020-11-06 DIAGNOSIS — E11.42 TYPE 2 DIABETES MELLITUS WITH DIABETIC POLYNEUROPATHY, WITH LONG-TERM CURRENT USE OF INSULIN (HCC): ICD-10-CM

## 2020-11-06 DIAGNOSIS — Z79.4 TYPE 2 DIABETES MELLITUS WITH DIABETIC POLYNEUROPATHY, WITH LONG-TERM CURRENT USE OF INSULIN (HCC): ICD-10-CM

## 2020-11-07 RX ORDER — INSULIN GLARGINE 100 [IU]/ML
INJECTION, SOLUTION SUBCUTANEOUS
Qty: 15 ML | Refills: 0 | Status: SHIPPED | OUTPATIENT
Start: 2020-11-07 | End: 2020-12-05

## 2020-11-16 DIAGNOSIS — K21.9 GERD WITHOUT ESOPHAGITIS: ICD-10-CM

## 2020-11-16 RX ORDER — OMEPRAZOLE 20 MG/1
CAPSULE, DELAYED RELEASE ORAL
Qty: 30 CAPSULE | Refills: 0 | Status: SHIPPED | OUTPATIENT
Start: 2020-11-16 | End: 2021-01-03

## 2020-12-05 DIAGNOSIS — E11.42 TYPE 2 DIABETES MELLITUS WITH DIABETIC POLYNEUROPATHY, WITH LONG-TERM CURRENT USE OF INSULIN (HCC): ICD-10-CM

## 2020-12-05 DIAGNOSIS — E78.2 MIXED HYPERLIPIDEMIA: ICD-10-CM

## 2020-12-05 DIAGNOSIS — Z79.4 TYPE 2 DIABETES MELLITUS WITH DIABETIC POLYNEUROPATHY, WITH LONG-TERM CURRENT USE OF INSULIN (HCC): ICD-10-CM

## 2020-12-05 RX ORDER — FENOFIBRATE 145 MG/1
TABLET, COATED ORAL
Qty: 30 TABLET | Refills: 0 | Status: SHIPPED | OUTPATIENT
Start: 2020-12-05 | End: 2021-01-03

## 2020-12-05 RX ORDER — ATORVASTATIN CALCIUM 20 MG/1
TABLET, FILM COATED ORAL
Qty: 30 TABLET | Refills: 0 | Status: SHIPPED | OUTPATIENT
Start: 2020-12-05 | End: 2021-01-03

## 2020-12-05 RX ORDER — SITAGLIPTIN 100 MG/1
TABLET, FILM COATED ORAL
Qty: 30 TABLET | Refills: 0 | Status: SHIPPED | OUTPATIENT
Start: 2020-12-05 | End: 2021-01-03

## 2020-12-05 RX ORDER — INSULIN GLARGINE 100 [IU]/ML
INJECTION, SOLUTION SUBCUTANEOUS
Qty: 15 ML | Refills: 0 | Status: SHIPPED | OUTPATIENT
Start: 2020-12-05 | End: 2021-01-03

## 2021-01-02 DIAGNOSIS — Z79.4 TYPE 2 DIABETES MELLITUS WITH DIABETIC POLYNEUROPATHY, WITH LONG-TERM CURRENT USE OF INSULIN (HCC): ICD-10-CM

## 2021-01-02 DIAGNOSIS — I10 ESSENTIAL HYPERTENSION: ICD-10-CM

## 2021-01-02 DIAGNOSIS — E11.42 TYPE 2 DIABETES MELLITUS WITH DIABETIC POLYNEUROPATHY, WITH LONG-TERM CURRENT USE OF INSULIN (HCC): ICD-10-CM

## 2021-01-02 DIAGNOSIS — K21.9 GERD WITHOUT ESOPHAGITIS: ICD-10-CM

## 2021-01-02 DIAGNOSIS — E78.2 MIXED HYPERLIPIDEMIA: ICD-10-CM

## 2021-01-03 RX ORDER — METOPROLOL SUCCINATE 50 MG/1
TABLET, EXTENDED RELEASE ORAL
Qty: 90 TABLET | Refills: 0 | Status: SHIPPED | OUTPATIENT
Start: 2021-01-03 | End: 2021-03-29

## 2021-01-03 RX ORDER — INSULIN GLARGINE 100 [IU]/ML
INJECTION, SOLUTION SUBCUTANEOUS
Qty: 15 ML | Refills: 0 | Status: SHIPPED | OUTPATIENT
Start: 2021-01-03 | End: 2021-02-22

## 2021-01-03 RX ORDER — OMEPRAZOLE 20 MG/1
CAPSULE, DELAYED RELEASE ORAL
Qty: 30 CAPSULE | Refills: 0 | Status: SHIPPED | OUTPATIENT
Start: 2021-01-03 | End: 2021-02-01

## 2021-01-03 RX ORDER — SITAGLIPTIN 100 MG/1
TABLET, FILM COATED ORAL
Qty: 30 TABLET | Refills: 0 | Status: SHIPPED | OUTPATIENT
Start: 2021-01-03 | End: 2021-02-01

## 2021-01-03 RX ORDER — FENOFIBRATE 145 MG/1
TABLET, COATED ORAL
Qty: 30 TABLET | Refills: 0 | Status: SHIPPED | OUTPATIENT
Start: 2021-01-03 | End: 2021-02-01

## 2021-01-03 RX ORDER — ATORVASTATIN CALCIUM 20 MG/1
TABLET, FILM COATED ORAL
Qty: 30 TABLET | Refills: 0 | Status: SHIPPED | OUTPATIENT
Start: 2021-01-03 | End: 2021-02-01

## 2021-01-15 DIAGNOSIS — E11.42 TYPE 2 DIABETES MELLITUS WITH DIABETIC POLYNEUROPATHY, WITHOUT LONG-TERM CURRENT USE OF INSULIN (HCC): ICD-10-CM

## 2021-01-15 RX ORDER — PEN NEEDLE, DIABETIC 31 GX5/16"
NEEDLE, DISPOSABLE MISCELLANEOUS
Qty: 100 EACH | Refills: 0 | Status: SHIPPED | OUTPATIENT
Start: 2021-01-15 | End: 2021-05-14

## 2021-01-19 ENCOUNTER — TELEPHONE (OUTPATIENT)
Dept: ADMINISTRATIVE | Facility: OTHER | Age: 61
End: 2021-01-19

## 2021-01-19 ENCOUNTER — LAB (OUTPATIENT)
Dept: LAB | Facility: CLINIC | Age: 61
End: 2021-01-19
Payer: COMMERCIAL

## 2021-01-19 DIAGNOSIS — Z79.4 TYPE 2 DIABETES MELLITUS WITH DIABETIC POLYNEUROPATHY, WITH LONG-TERM CURRENT USE OF INSULIN (HCC): ICD-10-CM

## 2021-01-19 DIAGNOSIS — E78.2 MIXED HYPERLIPIDEMIA: ICD-10-CM

## 2021-01-19 DIAGNOSIS — E11.42 TYPE 2 DIABETES MELLITUS WITH DIABETIC POLYNEUROPATHY, WITH LONG-TERM CURRENT USE OF INSULIN (HCC): ICD-10-CM

## 2021-01-19 LAB
ALBUMIN SERPL BCP-MCNC: 3.1 G/DL (ref 3.5–5)
ALP SERPL-CCNC: 69 U/L (ref 46–116)
ALT SERPL W P-5'-P-CCNC: 23 U/L (ref 12–78)
ANION GAP SERPL CALCULATED.3IONS-SCNC: 4 MMOL/L (ref 4–13)
AST SERPL W P-5'-P-CCNC: 21 U/L (ref 5–45)
BASOPHILS # BLD AUTO: 0.07 THOUSANDS/ΜL (ref 0–0.1)
BASOPHILS NFR BLD AUTO: 1 % (ref 0–1)
BILIRUB SERPL-MCNC: 0.24 MG/DL (ref 0.2–1)
BUN SERPL-MCNC: 50 MG/DL (ref 5–25)
CALCIUM ALBUM COR SERPL-MCNC: 9.8 MG/DL (ref 8.3–10.1)
CALCIUM SERPL-MCNC: 9.1 MG/DL (ref 8.3–10.1)
CHLORIDE SERPL-SCNC: 113 MMOL/L (ref 100–108)
CHOLEST SERPL-MCNC: 171 MG/DL (ref 50–200)
CO2 SERPL-SCNC: 27 MMOL/L (ref 21–32)
CREAT SERPL-MCNC: 2.82 MG/DL (ref 0.6–1.3)
EOSINOPHIL # BLD AUTO: 0.12 THOUSAND/ΜL (ref 0–0.61)
EOSINOPHIL NFR BLD AUTO: 1 % (ref 0–6)
ERYTHROCYTE [DISTWIDTH] IN BLOOD BY AUTOMATED COUNT: 14.1 % (ref 11.6–15.1)
EST. AVERAGE GLUCOSE BLD GHB EST-MCNC: 108 MG/DL
GFR SERPL CREATININE-BSD FRML MDRD: 17 ML/MIN/1.73SQ M
GLUCOSE P FAST SERPL-MCNC: 71 MG/DL (ref 65–99)
HBA1C MFR BLD: 5.4 %
HCT VFR BLD AUTO: 34.3 % (ref 34.8–46.1)
HDLC SERPL-MCNC: 26 MG/DL
HGB BLD-MCNC: 10.4 G/DL (ref 11.5–15.4)
IMM GRANULOCYTES # BLD AUTO: 0.08 THOUSAND/UL (ref 0–0.2)
IMM GRANULOCYTES NFR BLD AUTO: 1 % (ref 0–2)
LDLC SERPL CALC-MCNC: 88 MG/DL (ref 0–100)
LYMPHOCYTES # BLD AUTO: 1.92 THOUSANDS/ΜL (ref 0.6–4.47)
LYMPHOCYTES NFR BLD AUTO: 17 % (ref 14–44)
MCH RBC QN AUTO: 27.3 PG (ref 26.8–34.3)
MCHC RBC AUTO-ENTMCNC: 30.3 G/DL (ref 31.4–37.4)
MCV RBC AUTO: 90 FL (ref 82–98)
MONOCYTES # BLD AUTO: 0.72 THOUSAND/ΜL (ref 0.17–1.22)
MONOCYTES NFR BLD AUTO: 6 % (ref 4–12)
NEUTROPHILS # BLD AUTO: 8.37 THOUSANDS/ΜL (ref 1.85–7.62)
NEUTS SEG NFR BLD AUTO: 74 % (ref 43–75)
NONHDLC SERPL-MCNC: 145 MG/DL
NRBC BLD AUTO-RTO: 0 /100 WBCS
PLATELET # BLD AUTO: 520 THOUSANDS/UL (ref 149–390)
PMV BLD AUTO: 10.4 FL (ref 8.9–12.7)
POTASSIUM SERPL-SCNC: 3.8 MMOL/L (ref 3.5–5.3)
PROT SERPL-MCNC: 7.3 G/DL (ref 6.4–8.2)
RBC # BLD AUTO: 3.81 MILLION/UL (ref 3.81–5.12)
SODIUM SERPL-SCNC: 144 MMOL/L (ref 136–145)
TRIGL SERPL-MCNC: 284 MG/DL
TSH SERPL DL<=0.05 MIU/L-ACNC: 1.59 UIU/ML (ref 0.36–3.74)
WBC # BLD AUTO: 11.28 THOUSAND/UL (ref 4.31–10.16)

## 2021-01-19 PROCEDURE — 80053 COMPREHEN METABOLIC PANEL: CPT

## 2021-01-19 PROCEDURE — 84443 ASSAY THYROID STIM HORMONE: CPT

## 2021-01-19 PROCEDURE — 36415 COLL VENOUS BLD VENIPUNCTURE: CPT

## 2021-01-19 PROCEDURE — 80061 LIPID PANEL: CPT

## 2021-01-19 PROCEDURE — 3044F HG A1C LEVEL LT 7.0%: CPT | Performed by: INTERNAL MEDICINE

## 2021-01-19 PROCEDURE — 85025 COMPLETE CBC W/AUTO DIFF WBC: CPT

## 2021-01-19 PROCEDURE — 83036 HEMOGLOBIN GLYCOSYLATED A1C: CPT

## 2021-01-19 NOTE — TELEPHONE ENCOUNTER
----- Message from Adryan Arango sent at 1/18/2021  1:54 PM EST -----  Regarding: Colonoscopy  01/18/21 1:54 PM    Hello, our patient Pradip Becerra has had CRC: Colonoscopy completed/performed  Please assist in updating the patient chart by pulling the Care Everywhere (CE) document  The date of service is 1/16/09       Thank you,  Adryan David 64

## 2021-01-20 ENCOUNTER — OFFICE VISIT (OUTPATIENT)
Dept: INTERNAL MEDICINE CLINIC | Facility: CLINIC | Age: 61
End: 2021-01-20
Payer: COMMERCIAL

## 2021-01-20 VITALS
TEMPERATURE: 97.9 F | BODY MASS INDEX: 26.15 KG/M2 | HEART RATE: 80 BPM | WEIGHT: 153.2 LBS | RESPIRATION RATE: 12 BRPM | SYSTOLIC BLOOD PRESSURE: 136 MMHG | DIASTOLIC BLOOD PRESSURE: 84 MMHG | HEIGHT: 64 IN

## 2021-01-20 DIAGNOSIS — K21.9 GERD WITHOUT ESOPHAGITIS: ICD-10-CM

## 2021-01-20 DIAGNOSIS — Z79.4 TYPE 2 DIABETES MELLITUS WITH DIABETIC POLYNEUROPATHY, WITH LONG-TERM CURRENT USE OF INSULIN (HCC): Primary | ICD-10-CM

## 2021-01-20 DIAGNOSIS — E78.2 MIXED HYPERLIPIDEMIA: ICD-10-CM

## 2021-01-20 DIAGNOSIS — E11.42 TYPE 2 DIABETES MELLITUS WITH DIABETIC POLYNEUROPATHY, WITH LONG-TERM CURRENT USE OF INSULIN (HCC): Primary | ICD-10-CM

## 2021-01-20 DIAGNOSIS — N18.4 CKD (CHRONIC KIDNEY DISEASE) STAGE 4, GFR 15-29 ML/MIN (HCC): ICD-10-CM

## 2021-01-20 DIAGNOSIS — I10 ESSENTIAL HYPERTENSION: ICD-10-CM

## 2021-01-20 DIAGNOSIS — I50.22 CHRONIC SYSTOLIC CONGESTIVE HEART FAILURE (HCC): ICD-10-CM

## 2021-01-20 DIAGNOSIS — E66.3 OVERWEIGHT: ICD-10-CM

## 2021-01-20 PROCEDURE — 99214 OFFICE O/P EST MOD 30 MIN: CPT | Performed by: INTERNAL MEDICINE

## 2021-01-20 PROCEDURE — 3079F DIAST BP 80-89 MM HG: CPT | Performed by: INTERNAL MEDICINE

## 2021-01-20 PROCEDURE — 3066F NEPHROPATHY DOC TX: CPT | Performed by: INTERNAL MEDICINE

## 2021-01-20 PROCEDURE — 1036F TOBACCO NON-USER: CPT | Performed by: INTERNAL MEDICINE

## 2021-01-20 PROCEDURE — 3075F SYST BP GE 130 - 139MM HG: CPT | Performed by: INTERNAL MEDICINE

## 2021-01-20 PROCEDURE — 3008F BODY MASS INDEX DOCD: CPT | Performed by: INTERNAL MEDICINE

## 2021-01-20 NOTE — PROGRESS NOTES
INTERNAL MEDICINE OFFICE VISIT  [facility] AT [address], 0 [facility]  Tel: [phone]      NAME: Elia Gonzalez  AGE: 64 y o  SEX: female  : 1960   MRN: [id number]    DATE: 2021  TIME: 6:44 PM      Assessment and Plan:  1  Type 2 diabetes mellitus with diabetic polyneuropathy, with long-term current use of insulin (HCC)   continue present medication  - CBC and differential; Future  - Comprehensive metabolic panel; Future  - Lipid panel; Future  - TSH, 3rd generation; Future  - Hemoglobin A1C; Future  - Microalbumin / creatinine urine ratio; Future    2  Essential hypertension   continue metoprolol and furosemide    3  Mixed hyperlipidemia   continue atorvastatin    4  Chronic systolic congestive heart failure (HCC)   continue furosemide    5  CKD (chronic kidney disease) stage 4, GFR 15-29 ml/min ([facility])   follow-up creatinine    6  GERD without esophagitis   continue omeprazole    7  Overweight  BMI Counseling: Body mass index is 26 3 kg/m²  The BMI is above normal  Nutrition recommendations include decreasing portion sizes, encouraging healthy choices of fruits and vegetables and moderation in carbohydrate intake  Exercise recommendations include moderate physical activity 150 minutes/week  No pharmacotherapy was ordered  - Counseling Documentation: patient was counseled regarding: diagnostic results, instructions for management, risk factor reductions, prognosis, patient and family education, risks and benefits of treatment options and importance of compliance with treatment  - Medication Side Effects: Adverse side effects of medications were reviewed with the patient/guardian today  Return for follow up visit in  4 months or earlier, if needed        Chief Complaint:  Chief Complaint   Patient presents with    Annual Exam     4 month f/u labs          History of Present Illness:    type 2 diabetes -well controlled with the insulin   Hypertension -well controlled  Hyperlipidemia-takes atorvastatin regularly   Congestive heart failure -stable on furosemide   Chronic kidney disease-her creatinine was higher this time at 2 8, she follows up with Nephrology regularly   GERD -stable on the omeprazole   Overweight -she was told to try to lose weight      Active Problem List:  Patient Active Problem List   Diagnosis    Arthropathy    Carpal tunnel syndrome    Mixed hyperlipidemia    Essential hypertension    Tachycardia    Type 2 diabetes mellitus with diabetic polyneuropathy, with long-term current use of insulin ([facility])    Polyneuropathy associated with underlying disease ([facility] )    GERD without esophagitis    Chronic idiopathic constipation    Overweight    CKD (chronic kidney disease) stage 4, GFR 15-29 ml/min ([facility])    Chronic systolic congestive heart failure ([facility] )    Nephrotic range proteinuria         Past Medical History:  Past Medical History:   Diagnosis Date    Abnormal CBC 6/12/2018    Anxiety     Benign hypertension     Cigarette nicotine dependence without complication 95/17/7792    CTS (carpal tunnel syndrome)     Essential hypertriglyceridemia     Hyperlipidemia     last assessed 12/12/2017    Sebaceous cyst     Type 2 diabetes mellitus ([facility] )     last assessed 12/12/2017         Past Surgical History:  Past Surgical History:   Procedure Laterality Date    CHOLECYSTECTOMY LAPAROSCOPIC      KNEE SURGERY Right          Family History:  Family History   Problem Relation Age of Onset    Other Mother         acute subdural hematoma    Coronary artery disease Mother         CABG    Lung cancer Father     Gout Brother     Lung cancer Brother          Social History:  Social History     Socioeconomic History    Marital status: /Civil Union     Spouse name: None    Number of children: None    Years of education: None    Highest education level: None   Occupational History    None   Social Needs    Financial resource strain: None    Food insecurity     Worry: None     Inability: None    Transportation needs     Medical: None     Non-medical: None   Tobacco Use    Smoking status: Former Smoker     Packs/day: 0 50     Years: 15 00     Pack years: 7 50     Types: Cigarettes     Quit date: 2019     Years since quittin 0    Smokeless tobacco: Never Used   Substance and Sexual Activity    Alcohol use: No    Drug use: No    Sexual activity: Yes     Partners: Male   Lifestyle    Physical activity     Days per week: 4 days     Minutes per session: 40 min    Stress: Not at all   Relationships    Social connections     Talks on phone: None     Gets together: None     Attends Hindu service: None     Active member of club or organization: None     Attends meetings of clubs or organizations: None     Relationship status: None    Intimate partner violence     Fear of current or ex partner: None     Emotionally abused: None     Physically abused: None     Forced sexual activity: None   Other Topics Concern    None   Social History Narrative    None         Allergies:   Allergies   Allergen Reactions    Wound Dressing Adhesive Rash         Medications:    Current Outpatient Medications:     aspirin 81 MG tablet, Take 1 tablet by mouth daily, Disp: , Rfl:     atorvastatin (LIPITOR) 20 mg tablet, TAKE ONE TABLET BY MOUTH EVERY DAY , Disp: 30 tablet, Rfl: 0    B-D ULTRAFINE III SHORT PEN 31G X 8 MM MISC, use as directed for insulin injections , Disp: 100 each, Rfl: 0    fenofibrate (TRICOR) 145 mg tablet, TAKE ONE TABLET BY MOUTH EVERY DAY , Disp: 30 tablet, Rfl: 0    furosemide (LASIX) 40 mg tablet, 2 (two) times a day Take 2 tablets in the morning and 1 tablet at night, Disp: , Rfl:     Januvia 100 MG tablet, TAKE ONE TABLET BY MOUTH EVERY DAY , Disp: 30 tablet, Rfl: 0    Lantus SoloStar 100 units/mL injection pen, Inject 30 units under the skin once daily as directed, Disp: 15 mL, Rfl: 0    metoprolol succinate (TOPROL-XL) 50 mg 24 hr tablet, TAKE ONE TABLET BY MOUTH EVERY DAY , Disp: 90 tablet, Rfl: 0    omeprazole (PriLOSEC) 20 mg delayed release capsule, TAKE ONE CAPSULE BY MOUTH ONCE A DAY , Disp: 30 capsule, Rfl: 0    Potassium (POTASSIMIN PO), Take by mouth daily, Disp: , Rfl:     Aspirin Buf,CaCarb-MgCarb-MgO, 81 MG TABS, Take 81 mg by mouth, Disp: , Rfl:       The following portions of the patient's history were reviewed and updated as appropriate: past medical history, past surgical history, family history, social history, allergies, current medications and active problem list       Review of Systems:  Constitutional: Denies fever, chills, weight gain, weight loss, fatigue  Eyes: Denies eye redness, eye discharge, double vision, change in visual acuity  ENT: Denies hearing loss, tinnitus, sneezing, nasal congestion, nasal discharge, sore throat   Respiratory: Denies cough, expectoration, hemoptysis, shortness of breath, wheezing  Cardiovascular: Denies chest pain, palpitations, lower extremity swelling, orthopnea, PND  Gastrointestinal: Denies abdominal pain, heartburn, nausea, vomiting, hematemesis, diarrhea, bloody stools  Genito-Urinary: Denies dysuria, frequency, difficulty in micturition, nocturia, incontinence  Musculoskeletal: Denies back pain, joint pain, muscle pain  Neurologic: Denies confusion, lightheadedness, syncope, headache, focal weakness, sensory changes, seizures  Endocrine: Denies polyuria, polydipsia, temperature intolerance  Allergy and Immunology: Denies hives, insect bite sensitivity  Hematological and Lymphatic: Denies bleeding problems, swollen glands   Psychological: Denies depression, suicidal ideation, anxiety, panic, mood swings  Dermatological: Denies pruritus, rash, skin lesion changes      Vitals:  Vitals:    01/20/21 1815   BP: 136/84   Pulse: 80   Resp: 12   Temp: 97 9 °F (36 6 °C)       Body mass index is 26 3 kg/m²      Weight (last 2 days) Date/Time   Weight    01/20/21 1815   69 5 (153 2)                Physical Examination:  General: Patient is not in acute distress  Awake, alert, responding to commands  No weight gain or loss  Head: Normocephalic  Atraumatic  Eyes: Conjunctiva and lids with no swelling, erythema or discharge  Both pupils normal sized, round and reactive to light  Sclera nonicteric  ENT: External examination of nose and ear normal  Otoscopic examination shows translucent tympanic membranes with patent canals without erythema  Oropharynx moist with no erythema, edema, exudate or lesions  Neck: Supple  JVP not raised  Trachea midline  No masses  No thyromegaly  Lungs: No signs of increased work of breathing or respiratory distress  Bilateral bronchovascular breath sounds with no crackles or rhonchi  Chest wall: No tenderness  Cardiovascular: Normal PMI  No thrills  Regular rate and rhythm  S1 and S2 normal  No murmur, rub or gallop  Gastrointestinal: Abdomen soft, nontender  No guarding or rigidity  Liver and spleen not palpable  Bowel sounds present  Neurologic: Cranial nerves II-XII intact   Cortical functions normal  Motor system - Reflexes 2+ and symmetrical  Sensations normal  Musculoskeletal: Gait normal  No joint tenderness  Integumentary: Skin normal with no rash or lesions  Lymphatic: No palpable lymph nodes in neck, axilla or groin  Extremities: No clubbing, cyanosis, edema or varicosities  Psychological: Judgement and insight normal  Mood and affect normal      Laboratory Results:  CBC with diff:   Lab Results   Component Value Date    WBC 11 28 (H) 01/19/2021    WBC 9 0 04/21/2015    RBC 3 81 01/19/2021    RBC 3 65 (L) 04/21/2015    HGB 10 4 (L) 01/19/2021    HGB 10 8 (L) 04/21/2015    HCT 34 3 (L) 01/19/2021    HCT 32 2 (L) 04/21/2015    MCV 90 01/19/2021    MCV 88 04/21/2015    MCH 27 3 01/19/2021    MCH 29 6 04/21/2015    RDW 14 1 01/19/2021    RDW 10 7 (L) 04/21/2015     (H) 01/19/2021     (H) 04/21/2015 CMP:  Lab Results   Component Value Date    CREATININE 2 82 (H) 01/19/2021    CREATININE 1 2 (H) 04/21/2015    BUN 50 (H) 01/19/2021    BUN 30 (H) 04/21/2015     04/21/2015    K 3 8 01/19/2021    K 4 8 04/21/2015     (H) 01/19/2021     04/21/2015    CO2 27 01/19/2021    CO2 24 9 04/21/2015    GLUCOSE 293 (H) 04/21/2015    PROT 6 8 04/21/2015    ALKPHOS 69 01/19/2021    ALKPHOS 45 (L) 04/21/2015    ALT 23 01/19/2021    ALT 33 04/21/2015    AST 21 01/19/2021    AST 16 04/21/2015       Lab Results   Component Value Date    HGBA1C 5 4 01/19/2021    HGBA1C 11 2 (H) 04/21/2015    MG 2 1 10/31/2019    PHOS 3 2 11/27/2019       No results found for: TROPONINI, CKMB, CKTOTAL    Lipid Profile:   Lab Results   Component Value Date    CHOL 318 04/21/2015     Lab Results   Component Value Date    HDL 26 (L) 01/19/2021    HDL 31 (L) 08/29/2020     Lab Results   Component Value Date    LDLCALC 88 01/19/2021    LDLCALC 107 (H) 08/29/2020     Lab Results   Component Value Date    TRIG 284 (H) 01/19/2021    TRIG 245 (H) 08/29/2020       Imaging Results:  RADIOLOGY RESULTS  Ordered by an unspecified provider         Health Maintenance:  Health Maintenance   Topic Date Due    MAMMOGRAM  1960    Pneumococcal Vaccine: Pediatrics (0 to 5 Years) and At-Risk Patients (6 to 59 Years) (1 of 1 - PPSV23) 01/16/1966    DM Eye Exam  01/16/1970    HIV Screening  01/16/1975    Annual Physical  01/16/1978    DTaP,Tdap,and Td Vaccines (1 - Tdap) 01/16/1981    Cervical Cancer Screening  01/16/1981    Colorectal Cancer Screening  01/16/2010    Influenza Vaccine (1) 09/01/2020    URINE MICROALBUMIN  11/27/2020    Depression Screening PHQ  12/26/2020    HEMOGLOBIN A1C  07/19/2021    BMI: Followup Plan  09/02/2021    Diabetic Foot Exam  09/02/2021    BMI: Adult  01/20/2022    Hepatitis C Screening  Completed    HIB Vaccine  Aged Out    Hepatitis B Vaccine  Aged Out    IPV Vaccine  Aged Out    Hepatitis A Vaccine  Aged Out    Meningococcal ACWY Vaccine  Aged Out    HPV Vaccine  Aged Out     Immunization History   Administered Date(s) Administered    Influenza, recombinant, quadrivalent,injectable, preservative free 10/23/2018         Meme Elliott MD  1/20/2021,6:44 PM

## 2021-01-31 DIAGNOSIS — E11.42 TYPE 2 DIABETES MELLITUS WITH DIABETIC POLYNEUROPATHY, WITHOUT LONG-TERM CURRENT USE OF INSULIN (HCC): ICD-10-CM

## 2021-01-31 DIAGNOSIS — K21.9 GERD WITHOUT ESOPHAGITIS: ICD-10-CM

## 2021-01-31 DIAGNOSIS — Z79.4 TYPE 2 DIABETES MELLITUS WITH DIABETIC POLYNEUROPATHY, WITH LONG-TERM CURRENT USE OF INSULIN (HCC): ICD-10-CM

## 2021-01-31 DIAGNOSIS — E11.42 TYPE 2 DIABETES MELLITUS WITH DIABETIC POLYNEUROPATHY, WITH LONG-TERM CURRENT USE OF INSULIN (HCC): ICD-10-CM

## 2021-01-31 DIAGNOSIS — E78.2 MIXED HYPERLIPIDEMIA: ICD-10-CM

## 2021-02-01 RX ORDER — GLIPIZIDE 10 MG/1
TABLET, FILM COATED, EXTENDED RELEASE ORAL
Qty: 180 TABLET | Refills: 0 | Status: SHIPPED | OUTPATIENT
Start: 2021-02-01 | End: 2021-04-29

## 2021-02-01 RX ORDER — FENOFIBRATE 145 MG/1
TABLET, COATED ORAL
Qty: 30 TABLET | Refills: 0 | Status: SHIPPED | OUTPATIENT
Start: 2021-02-01 | End: 2021-03-01

## 2021-02-01 RX ORDER — ATORVASTATIN CALCIUM 20 MG/1
TABLET, FILM COATED ORAL
Qty: 30 TABLET | Refills: 0 | Status: SHIPPED | OUTPATIENT
Start: 2021-02-01 | End: 2021-03-01

## 2021-02-01 RX ORDER — SITAGLIPTIN 100 MG/1
TABLET, FILM COATED ORAL
Qty: 30 TABLET | Refills: 0 | Status: SHIPPED | OUTPATIENT
Start: 2021-02-01 | End: 2021-03-01

## 2021-02-01 RX ORDER — OMEPRAZOLE 20 MG/1
CAPSULE, DELAYED RELEASE ORAL
Qty: 30 CAPSULE | Refills: 0 | Status: SHIPPED | OUTPATIENT
Start: 2021-02-01 | End: 2021-03-01

## 2021-02-20 DIAGNOSIS — Z79.4 TYPE 2 DIABETES MELLITUS WITH DIABETIC POLYNEUROPATHY, WITH LONG-TERM CURRENT USE OF INSULIN (HCC): ICD-10-CM

## 2021-02-20 DIAGNOSIS — E11.42 TYPE 2 DIABETES MELLITUS WITH DIABETIC POLYNEUROPATHY, WITH LONG-TERM CURRENT USE OF INSULIN (HCC): ICD-10-CM

## 2021-02-22 RX ORDER — INSULIN GLARGINE 100 [IU]/ML
INJECTION, SOLUTION SUBCUTANEOUS
Qty: 15 ML | Refills: 0 | Status: SHIPPED | OUTPATIENT
Start: 2021-02-22 | End: 2021-04-08

## 2021-02-28 DIAGNOSIS — E11.42 TYPE 2 DIABETES MELLITUS WITH DIABETIC POLYNEUROPATHY, WITH LONG-TERM CURRENT USE OF INSULIN (HCC): ICD-10-CM

## 2021-02-28 DIAGNOSIS — E78.2 MIXED HYPERLIPIDEMIA: ICD-10-CM

## 2021-02-28 DIAGNOSIS — Z79.4 TYPE 2 DIABETES MELLITUS WITH DIABETIC POLYNEUROPATHY, WITH LONG-TERM CURRENT USE OF INSULIN (HCC): ICD-10-CM

## 2021-02-28 DIAGNOSIS — K21.9 GERD WITHOUT ESOPHAGITIS: ICD-10-CM

## 2021-03-01 RX ORDER — ATORVASTATIN CALCIUM 20 MG/1
TABLET, FILM COATED ORAL
Qty: 30 TABLET | Refills: 0 | Status: SHIPPED | OUTPATIENT
Start: 2021-03-01 | End: 2021-03-29

## 2021-03-01 RX ORDER — SITAGLIPTIN 100 MG/1
TABLET, FILM COATED ORAL
Qty: 30 TABLET | Refills: 0 | Status: SHIPPED | OUTPATIENT
Start: 2021-03-01 | End: 2021-03-29

## 2021-03-01 RX ORDER — OMEPRAZOLE 20 MG/1
CAPSULE, DELAYED RELEASE ORAL
Qty: 30 CAPSULE | Refills: 0 | Status: SHIPPED | OUTPATIENT
Start: 2021-03-01 | End: 2021-03-29

## 2021-03-01 RX ORDER — FENOFIBRATE 145 MG/1
TABLET, COATED ORAL
Qty: 30 TABLET | Refills: 0 | Status: SHIPPED | OUTPATIENT
Start: 2021-03-01 | End: 2021-03-29

## 2021-03-10 DIAGNOSIS — Z23 ENCOUNTER FOR IMMUNIZATION: ICD-10-CM

## 2021-03-28 DIAGNOSIS — E11.42 TYPE 2 DIABETES MELLITUS WITH DIABETIC POLYNEUROPATHY, WITH LONG-TERM CURRENT USE OF INSULIN (HCC): ICD-10-CM

## 2021-03-28 DIAGNOSIS — K21.9 GERD WITHOUT ESOPHAGITIS: ICD-10-CM

## 2021-03-28 DIAGNOSIS — I10 ESSENTIAL HYPERTENSION: ICD-10-CM

## 2021-03-28 DIAGNOSIS — E78.2 MIXED HYPERLIPIDEMIA: ICD-10-CM

## 2021-03-28 DIAGNOSIS — Z79.4 TYPE 2 DIABETES MELLITUS WITH DIABETIC POLYNEUROPATHY, WITH LONG-TERM CURRENT USE OF INSULIN (HCC): ICD-10-CM

## 2021-03-29 RX ORDER — SITAGLIPTIN 100 MG/1
TABLET, FILM COATED ORAL
Qty: 30 TABLET | Refills: 0 | Status: SHIPPED | OUTPATIENT
Start: 2021-03-29 | End: 2021-04-29

## 2021-03-29 RX ORDER — METOPROLOL SUCCINATE 50 MG/1
TABLET, EXTENDED RELEASE ORAL
Qty: 90 TABLET | Refills: 0 | Status: SHIPPED | OUTPATIENT
Start: 2021-03-29 | End: 2021-06-24

## 2021-03-29 RX ORDER — OMEPRAZOLE 20 MG/1
CAPSULE, DELAYED RELEASE ORAL
Qty: 30 CAPSULE | Refills: 0 | Status: SHIPPED | OUTPATIENT
Start: 2021-03-29 | End: 2021-04-29

## 2021-03-29 RX ORDER — ATORVASTATIN CALCIUM 20 MG/1
TABLET, FILM COATED ORAL
Qty: 30 TABLET | Refills: 0 | Status: SHIPPED | OUTPATIENT
Start: 2021-03-29 | End: 2021-04-29

## 2021-03-29 RX ORDER — FENOFIBRATE 145 MG/1
TABLET, COATED ORAL
Qty: 30 TABLET | Refills: 0 | Status: SHIPPED | OUTPATIENT
Start: 2021-03-29 | End: 2021-04-29

## 2021-04-08 DIAGNOSIS — E11.42 TYPE 2 DIABETES MELLITUS WITH DIABETIC POLYNEUROPATHY, WITH LONG-TERM CURRENT USE OF INSULIN (HCC): ICD-10-CM

## 2021-04-08 DIAGNOSIS — Z79.4 TYPE 2 DIABETES MELLITUS WITH DIABETIC POLYNEUROPATHY, WITH LONG-TERM CURRENT USE OF INSULIN (HCC): ICD-10-CM

## 2021-04-08 RX ORDER — INSULIN GLARGINE 100 [IU]/ML
INJECTION, SOLUTION SUBCUTANEOUS
Qty: 15 ML | Refills: 0 | Status: SHIPPED | OUTPATIENT
Start: 2021-04-08 | End: 2021-06-01

## 2021-04-29 DIAGNOSIS — E11.42 TYPE 2 DIABETES MELLITUS WITH DIABETIC POLYNEUROPATHY, WITHOUT LONG-TERM CURRENT USE OF INSULIN (HCC): ICD-10-CM

## 2021-04-29 DIAGNOSIS — K21.9 GERD WITHOUT ESOPHAGITIS: ICD-10-CM

## 2021-04-29 DIAGNOSIS — E11.42 TYPE 2 DIABETES MELLITUS WITH DIABETIC POLYNEUROPATHY, WITH LONG-TERM CURRENT USE OF INSULIN (HCC): ICD-10-CM

## 2021-04-29 DIAGNOSIS — E78.2 MIXED HYPERLIPIDEMIA: ICD-10-CM

## 2021-04-29 DIAGNOSIS — Z79.4 TYPE 2 DIABETES MELLITUS WITH DIABETIC POLYNEUROPATHY, WITH LONG-TERM CURRENT USE OF INSULIN (HCC): ICD-10-CM

## 2021-04-29 RX ORDER — ATORVASTATIN CALCIUM 20 MG/1
TABLET, FILM COATED ORAL
Qty: 30 TABLET | Refills: 0 | Status: SHIPPED | OUTPATIENT
Start: 2021-04-29 | End: 2021-06-01

## 2021-04-29 RX ORDER — SITAGLIPTIN 100 MG/1
TABLET, FILM COATED ORAL
Qty: 30 TABLET | Refills: 0 | Status: SHIPPED | OUTPATIENT
Start: 2021-04-29 | End: 2021-05-05

## 2021-04-29 RX ORDER — GLIPIZIDE 10 MG/1
TABLET, FILM COATED, EXTENDED RELEASE ORAL
Qty: 180 TABLET | Refills: 0 | Status: SHIPPED | OUTPATIENT
Start: 2021-04-29 | End: 2021-05-05

## 2021-04-29 RX ORDER — OMEPRAZOLE 20 MG/1
CAPSULE, DELAYED RELEASE ORAL
Qty: 30 CAPSULE | Refills: 0 | Status: SHIPPED | OUTPATIENT
Start: 2021-04-29 | End: 2021-06-01

## 2021-04-29 RX ORDER — FENOFIBRATE 145 MG/1
TABLET, COATED ORAL
Qty: 30 TABLET | Refills: 0 | Status: SHIPPED | OUTPATIENT
Start: 2021-04-29 | End: 2021-06-01

## 2021-05-04 ENCOUNTER — TELEPHONE (OUTPATIENT)
Dept: INTERNAL MEDICINE CLINIC | Facility: CLINIC | Age: 61
End: 2021-05-04

## 2021-05-04 NOTE — TELEPHONE ENCOUNTER
Having trouble with blood sugar at night the past 3-4 nights  She has been taking her insulin and Januvia    Please call pt back [phone]

## 2021-05-05 NOTE — TELEPHONE ENCOUNTER
Spoke to the patient and told her to discontinue the Januvia    She will continue the insulin for now and call me back in a week

## 2021-05-05 NOTE — TELEPHONE ENCOUNTER
CALLED PT   AND WAS NOTIFIED AND STATED SHE DOES NOT TAKE GLIPIZIDE SHE TAKES 30 UNITS OF INSULIN IN AM AND Tasha Caller AT LUNCH

## 2021-05-13 DIAGNOSIS — E11.42 TYPE 2 DIABETES MELLITUS WITH DIABETIC POLYNEUROPATHY, WITHOUT LONG-TERM CURRENT USE OF INSULIN (HCC): ICD-10-CM

## 2021-05-14 RX ORDER — PEN NEEDLE, DIABETIC 31 GX5/16"
NEEDLE, DISPOSABLE MISCELLANEOUS
Qty: 100 EACH | Refills: 0 | Status: SHIPPED | OUTPATIENT
Start: 2021-05-14

## 2021-05-29 DIAGNOSIS — E78.2 MIXED HYPERLIPIDEMIA: ICD-10-CM

## 2021-05-29 DIAGNOSIS — Z79.4 TYPE 2 DIABETES MELLITUS WITH DIABETIC POLYNEUROPATHY, WITH LONG-TERM CURRENT USE OF INSULIN (HCC): ICD-10-CM

## 2021-05-29 DIAGNOSIS — E11.42 TYPE 2 DIABETES MELLITUS WITH DIABETIC POLYNEUROPATHY, WITH LONG-TERM CURRENT USE OF INSULIN (HCC): ICD-10-CM

## 2021-05-29 DIAGNOSIS — K21.9 GERD WITHOUT ESOPHAGITIS: ICD-10-CM

## 2021-05-30 DIAGNOSIS — Z79.4 TYPE 2 DIABETES MELLITUS WITH DIABETIC POLYNEUROPATHY, WITH LONG-TERM CURRENT USE OF INSULIN (HCC): ICD-10-CM

## 2021-05-30 DIAGNOSIS — E11.42 TYPE 2 DIABETES MELLITUS WITH DIABETIC POLYNEUROPATHY, WITH LONG-TERM CURRENT USE OF INSULIN (HCC): ICD-10-CM

## 2021-06-01 RX ORDER — INSULIN GLARGINE 100 [IU]/ML
INJECTION, SOLUTION SUBCUTANEOUS
Qty: 15 ML | Refills: 0 | Status: SHIPPED | OUTPATIENT
Start: 2021-06-01 | End: 2021-07-19

## 2021-06-01 RX ORDER — ATORVASTATIN CALCIUM 20 MG/1
TABLET, FILM COATED ORAL
Qty: 30 TABLET | Refills: 0 | Status: SHIPPED | OUTPATIENT
Start: 2021-06-01 | End: 2021-06-29

## 2021-06-01 RX ORDER — FENOFIBRATE 145 MG/1
TABLET, COATED ORAL
Qty: 30 TABLET | Refills: 0 | Status: SHIPPED | OUTPATIENT
Start: 2021-06-01 | End: 2021-06-29

## 2021-06-01 RX ORDER — OMEPRAZOLE 20 MG/1
CAPSULE, DELAYED RELEASE ORAL
Qty: 30 CAPSULE | Refills: 0 | Status: SHIPPED | OUTPATIENT
Start: 2021-06-01 | End: 2021-06-29

## 2021-06-01 RX ORDER — SITAGLIPTIN 100 MG/1
TABLET, FILM COATED ORAL
Qty: 30 TABLET | Refills: 0 | Status: SHIPPED | OUTPATIENT
Start: 2021-06-01 | End: 2021-06-29

## 2021-06-24 DIAGNOSIS — I10 ESSENTIAL HYPERTENSION: ICD-10-CM

## 2021-06-24 RX ORDER — METOPROLOL SUCCINATE 50 MG/1
TABLET, EXTENDED RELEASE ORAL
Qty: 90 TABLET | Refills: 0 | Status: SHIPPED | OUTPATIENT
Start: 2021-06-24 | End: 2021-09-20

## 2021-06-28 ENCOUNTER — APPOINTMENT (OUTPATIENT)
Dept: LAB | Facility: CLINIC | Age: 61
End: 2021-06-28
Payer: COMMERCIAL

## 2021-06-28 DIAGNOSIS — E11.22 TYPE 2 DIABETES MELLITUS WITH ESRD (END-STAGE RENAL DISEASE) (HCC): ICD-10-CM

## 2021-06-28 DIAGNOSIS — N18.4 CHRONIC KIDNEY DISEASE, STAGE IV (SEVERE) (HCC): ICD-10-CM

## 2021-06-28 DIAGNOSIS — I10 HYPERTENSION, ESSENTIAL: ICD-10-CM

## 2021-06-28 DIAGNOSIS — E11.42 TYPE 2 DIABETES MELLITUS WITH DIABETIC POLYNEUROPATHY, WITH LONG-TERM CURRENT USE OF INSULIN (HCC): ICD-10-CM

## 2021-06-28 DIAGNOSIS — Z79.4 TYPE 2 DIABETES MELLITUS WITH DIABETIC POLYNEUROPATHY, WITH LONG-TERM CURRENT USE OF INSULIN (HCC): ICD-10-CM

## 2021-06-28 DIAGNOSIS — R80.9 NEPHROTIC RANGE PROTEINURIA: ICD-10-CM

## 2021-06-28 DIAGNOSIS — N18.6 TYPE 2 DIABETES MELLITUS WITH ESRD (END-STAGE RENAL DISEASE) (HCC): ICD-10-CM

## 2021-06-28 LAB
25(OH)D3 SERPL-MCNC: 6.4 NG/ML (ref 30–100)
ALBUMIN SERPL BCP-MCNC: 2.7 G/DL (ref 3.5–5)
ALP SERPL-CCNC: 62 U/L (ref 46–116)
ALT SERPL W P-5'-P-CCNC: 15 U/L (ref 12–78)
ANION GAP SERPL CALCULATED.3IONS-SCNC: 7 MMOL/L (ref 4–13)
AST SERPL W P-5'-P-CCNC: 17 U/L (ref 5–45)
BASOPHILS # BLD AUTO: 0.05 THOUSANDS/ΜL (ref 0–0.1)
BASOPHILS NFR BLD AUTO: 1 % (ref 0–1)
BILIRUB SERPL-MCNC: 0.3 MG/DL (ref 0.2–1)
BUN SERPL-MCNC: 53 MG/DL (ref 5–25)
CALCIUM ALBUM COR SERPL-MCNC: 9.8 MG/DL (ref 8.3–10.1)
CALCIUM SERPL-MCNC: 8.8 MG/DL (ref 8.3–10.1)
CHLORIDE SERPL-SCNC: 112 MMOL/L (ref 100–108)
CHOLEST SERPL-MCNC: 164 MG/DL (ref 50–200)
CO2 SERPL-SCNC: 23 MMOL/L (ref 21–32)
CREAT SERPL-MCNC: 3.36 MG/DL (ref 0.6–1.3)
CREAT UR-MCNC: 84.4 MG/DL
EOSINOPHIL # BLD AUTO: 0.14 THOUSAND/ΜL (ref 0–0.61)
EOSINOPHIL NFR BLD AUTO: 2 % (ref 0–6)
ERYTHROCYTE [DISTWIDTH] IN BLOOD BY AUTOMATED COUNT: 13.1 % (ref 11.6–15.1)
EST. AVERAGE GLUCOSE BLD GHB EST-MCNC: 94 MG/DL
FERRITIN SERPL-MCNC: 22 NG/ML (ref 8–388)
FOLATE SERPL-MCNC: 9.3 NG/ML (ref 3.1–17.5)
GFR SERPL CREATININE-BSD FRML MDRD: 14 ML/MIN/1.73SQ M
GLUCOSE P FAST SERPL-MCNC: 74 MG/DL (ref 65–99)
HBA1C MFR BLD: 4.9 %
HCT VFR BLD AUTO: 30.3 % (ref 34.8–46.1)
HDLC SERPL-MCNC: 27 MG/DL
HGB BLD-MCNC: 9.3 G/DL (ref 11.5–15.4)
IMM GRANULOCYTES # BLD AUTO: 0.03 THOUSAND/UL (ref 0–0.2)
IMM GRANULOCYTES NFR BLD AUTO: 0 % (ref 0–2)
IRON SATN MFR SERPL: 11 %
IRON SERPL-MCNC: 51 UG/DL (ref 50–170)
LDLC SERPL CALC-MCNC: 85 MG/DL (ref 0–100)
LYMPHOCYTES # BLD AUTO: 1.73 THOUSANDS/ΜL (ref 0.6–4.47)
LYMPHOCYTES NFR BLD AUTO: 19 % (ref 14–44)
MAGNESIUM SERPL-MCNC: 2.4 MG/DL (ref 1.6–2.6)
MCH RBC QN AUTO: 27.6 PG (ref 26.8–34.3)
MCHC RBC AUTO-ENTMCNC: 30.7 G/DL (ref 31.4–37.4)
MCV RBC AUTO: 90 FL (ref 82–98)
MICROALBUMIN UR-MCNC: 3270 MG/L (ref 0–20)
MICROALBUMIN/CREAT 24H UR: 3874 MG/G CREATININE (ref 0–30)
MONOCYTES # BLD AUTO: 0.58 THOUSAND/ΜL (ref 0.17–1.22)
MONOCYTES NFR BLD AUTO: 7 % (ref 4–12)
NEUTROPHILS # BLD AUTO: 6.37 THOUSANDS/ΜL (ref 1.85–7.62)
NEUTS SEG NFR BLD AUTO: 71 % (ref 43–75)
NONHDLC SERPL-MCNC: 137 MG/DL
NRBC BLD AUTO-RTO: 0 /100 WBCS
PHOSPHATE SERPL-MCNC: 4.5 MG/DL (ref 2.3–4.1)
PLATELET # BLD AUTO: 458 THOUSANDS/UL (ref 149–390)
PMV BLD AUTO: 10.5 FL (ref 8.9–12.7)
POTASSIUM SERPL-SCNC: 3.2 MMOL/L (ref 3.5–5.3)
PROT SERPL-MCNC: 7.2 G/DL (ref 6.4–8.2)
PTH-INTACT SERPL-MCNC: 281.8 PG/ML (ref 18.4–80.1)
RBC # BLD AUTO: 3.37 MILLION/UL (ref 3.81–5.12)
RETICS # AUTO: NORMAL 10*3/UL (ref 14097–95744)
RETICS # CALC: 1.15 % (ref 0.37–1.87)
SODIUM SERPL-SCNC: 142 MMOL/L (ref 136–145)
TIBC SERPL-MCNC: 475 UG/DL (ref 250–450)
TRIGL SERPL-MCNC: 262 MG/DL
TSH SERPL DL<=0.05 MIU/L-ACNC: 1.71 UIU/ML (ref 0.36–3.74)
VIT B12 SERPL-MCNC: 346 PG/ML (ref 100–900)
WBC # BLD AUTO: 8.9 THOUSAND/UL (ref 4.31–10.16)

## 2021-06-28 PROCEDURE — 82306 VITAMIN D 25 HYDROXY: CPT

## 2021-06-28 PROCEDURE — 82043 UR ALBUMIN QUANTITATIVE: CPT

## 2021-06-28 PROCEDURE — 82570 ASSAY OF URINE CREATININE: CPT

## 2021-06-28 PROCEDURE — 84443 ASSAY THYROID STIM HORMONE: CPT

## 2021-06-28 PROCEDURE — 83970 ASSAY OF PARATHORMONE: CPT

## 2021-06-28 PROCEDURE — 85045 AUTOMATED RETICULOCYTE COUNT: CPT

## 2021-06-28 PROCEDURE — 36415 COLL VENOUS BLD VENIPUNCTURE: CPT

## 2021-06-28 PROCEDURE — 80053 COMPREHEN METABOLIC PANEL: CPT

## 2021-06-28 PROCEDURE — 82607 VITAMIN B-12: CPT

## 2021-06-28 PROCEDURE — 84100 ASSAY OF PHOSPHORUS: CPT

## 2021-06-28 PROCEDURE — 80061 LIPID PANEL: CPT

## 2021-06-28 PROCEDURE — 83550 IRON BINDING TEST: CPT

## 2021-06-28 PROCEDURE — 82746 ASSAY OF FOLIC ACID SERUM: CPT

## 2021-06-28 PROCEDURE — 83735 ASSAY OF MAGNESIUM: CPT

## 2021-06-28 PROCEDURE — 85025 COMPLETE CBC W/AUTO DIFF WBC: CPT

## 2021-06-28 PROCEDURE — 82728 ASSAY OF FERRITIN: CPT

## 2021-06-28 PROCEDURE — 83540 ASSAY OF IRON: CPT

## 2021-06-28 PROCEDURE — 83036 HEMOGLOBIN GLYCOSYLATED A1C: CPT

## 2021-06-29 DIAGNOSIS — E11.42 TYPE 2 DIABETES MELLITUS WITH DIABETIC POLYNEUROPATHY, WITH LONG-TERM CURRENT USE OF INSULIN (HCC): ICD-10-CM

## 2021-06-29 DIAGNOSIS — K21.9 GERD WITHOUT ESOPHAGITIS: ICD-10-CM

## 2021-06-29 DIAGNOSIS — E78.2 MIXED HYPERLIPIDEMIA: ICD-10-CM

## 2021-06-29 DIAGNOSIS — Z79.4 TYPE 2 DIABETES MELLITUS WITH DIABETIC POLYNEUROPATHY, WITH LONG-TERM CURRENT USE OF INSULIN (HCC): ICD-10-CM

## 2021-06-29 RX ORDER — OMEPRAZOLE 20 MG/1
CAPSULE, DELAYED RELEASE ORAL
Qty: 30 CAPSULE | Refills: 0 | Status: SHIPPED | OUTPATIENT
Start: 2021-06-29 | End: 2021-07-26

## 2021-06-29 RX ORDER — SITAGLIPTIN 100 MG/1
TABLET, FILM COATED ORAL
Qty: 30 TABLET | Refills: 0 | Status: SHIPPED | OUTPATIENT
Start: 2021-06-29 | End: 2021-07-21

## 2021-06-29 RX ORDER — ATORVASTATIN CALCIUM 20 MG/1
TABLET, FILM COATED ORAL
Qty: 30 TABLET | Refills: 0 | Status: SHIPPED | OUTPATIENT
Start: 2021-06-29 | End: 2021-07-26

## 2021-06-29 RX ORDER — FENOFIBRATE 145 MG/1
TABLET, COATED ORAL
Qty: 30 TABLET | Refills: 0 | Status: SHIPPED | OUTPATIENT
Start: 2021-06-29 | End: 2021-07-26

## 2021-07-02 ENCOUNTER — APPOINTMENT (OUTPATIENT)
Dept: LAB | Facility: CLINIC | Age: 61
End: 2021-07-02
Payer: COMMERCIAL

## 2021-07-07 ENCOUNTER — HOSPITAL ENCOUNTER (OUTPATIENT)
Dept: ULTRASOUND IMAGING | Facility: CLINIC | Age: 61
Discharge: HOME | End: 2021-07-07
Payer: COMMERCIAL

## 2021-07-07 DIAGNOSIS — N18.4 CHRONIC KIDNEY DISEASE, STAGE IV (SEVERE) (HCC): ICD-10-CM

## 2021-07-07 PROCEDURE — 76770 US EXAM ABDO BACK WALL COMP: CPT

## 2021-07-12 ENCOUNTER — APPOINTMENT (OUTPATIENT)
Dept: LAB | Facility: CLINIC | Age: 61
End: 2021-07-12
Payer: COMMERCIAL

## 2021-07-12 DIAGNOSIS — N18.4 CHRONIC KIDNEY DISEASE, STAGE IV (SEVERE) (HCC): ICD-10-CM

## 2021-07-12 DIAGNOSIS — I10 ESSENTIAL HYPERTENSION, MALIGNANT: ICD-10-CM

## 2021-07-12 DIAGNOSIS — E11.22 TYPE 2 DIABETES MELLITUS WITH ESRD (END-STAGE RENAL DISEASE) (HCC): ICD-10-CM

## 2021-07-12 DIAGNOSIS — N18.6 TYPE 2 DIABETES MELLITUS WITH ESRD (END-STAGE RENAL DISEASE) (HCC): ICD-10-CM

## 2021-07-12 LAB
ALBUMIN SERPL BCP-MCNC: 2.6 G/DL (ref 3.5–5)
ANION GAP SERPL CALCULATED.3IONS-SCNC: 8 MMOL/L (ref 4–13)
BUN SERPL-MCNC: 59 MG/DL (ref 5–25)
CALCIUM ALBUM COR SERPL-MCNC: 9.7 MG/DL (ref 8.3–10.1)
CALCIUM SERPL-MCNC: 8.6 MG/DL (ref 8.3–10.1)
CHLORIDE SERPL-SCNC: 110 MMOL/L (ref 100–108)
CO2 SERPL-SCNC: 22 MMOL/L (ref 21–32)
CREAT SERPL-MCNC: 3.6 MG/DL (ref 0.6–1.3)
GFR SERPL CREATININE-BSD FRML MDRD: 13 ML/MIN/1.73SQ M
GLUCOSE P FAST SERPL-MCNC: 78 MG/DL (ref 65–99)
PHOSPHATE SERPL-MCNC: 4.9 MG/DL (ref 2.3–4.1)
POTASSIUM SERPL-SCNC: 3.6 MMOL/L (ref 3.5–5.3)
SODIUM SERPL-SCNC: 140 MMOL/L (ref 136–145)

## 2021-07-12 PROCEDURE — 80069 RENAL FUNCTION PANEL: CPT

## 2021-07-12 PROCEDURE — 36415 COLL VENOUS BLD VENIPUNCTURE: CPT

## 2021-07-19 DIAGNOSIS — E11.42 TYPE 2 DIABETES MELLITUS WITH DIABETIC POLYNEUROPATHY, WITH LONG-TERM CURRENT USE OF INSULIN (HCC): ICD-10-CM

## 2021-07-19 DIAGNOSIS — Z79.4 TYPE 2 DIABETES MELLITUS WITH DIABETIC POLYNEUROPATHY, WITH LONG-TERM CURRENT USE OF INSULIN (HCC): ICD-10-CM

## 2021-07-19 RX ORDER — INSULIN GLARGINE 100 [IU]/ML
INJECTION, SOLUTION SUBCUTANEOUS
Qty: 15 ML | Refills: 0 | Status: SHIPPED | OUTPATIENT
Start: 2021-07-19 | End: 2021-10-19

## 2021-07-19 NOTE — TELEPHONE ENCOUNTER
[facility] patricia, patient is due to see PCP  Patient is scheduled to see Dr Don Salazar on 7/21/2021 for follow-up  A one month refill of Lantus SoloStar will be approved

## 2021-07-21 ENCOUNTER — OFFICE VISIT (OUTPATIENT)
Dept: INTERNAL MEDICINE CLINIC | Facility: CLINIC | Age: 61
End: 2021-07-21
Payer: COMMERCIAL

## 2021-07-21 VITALS
WEIGHT: 139.4 LBS | OXYGEN SATURATION: 98 % | HEIGHT: 64 IN | HEART RATE: 95 BPM | SYSTOLIC BLOOD PRESSURE: 110 MMHG | DIASTOLIC BLOOD PRESSURE: 70 MMHG | TEMPERATURE: 97.8 F | BODY MASS INDEX: 23.8 KG/M2

## 2021-07-21 DIAGNOSIS — Z79.4 TYPE 2 DIABETES MELLITUS WITH DIABETIC POLYNEUROPATHY, WITH LONG-TERM CURRENT USE OF INSULIN (HCC): Primary | ICD-10-CM

## 2021-07-21 DIAGNOSIS — E11.42 TYPE 2 DIABETES MELLITUS WITH DIABETIC POLYNEUROPATHY, WITH LONG-TERM CURRENT USE OF INSULIN (HCC): Primary | ICD-10-CM

## 2021-07-21 DIAGNOSIS — E78.2 MIXED HYPERLIPIDEMIA: ICD-10-CM

## 2021-07-21 DIAGNOSIS — N18.5 STAGE 5 CHRONIC KIDNEY DISEASE (HCC): ICD-10-CM

## 2021-07-21 DIAGNOSIS — I10 ESSENTIAL HYPERTENSION: ICD-10-CM

## 2021-07-21 DIAGNOSIS — Z11.4 SCREENING FOR HIV (HUMAN IMMUNODEFICIENCY VIRUS): ICD-10-CM

## 2021-07-21 PROBLEM — N18.9 ANEMIA IN CHRONIC KIDNEY DISEASE: Status: ACTIVE | Noted: 2021-07-13

## 2021-07-21 PROBLEM — D63.1 ANEMIA IN CHRONIC KIDNEY DISEASE: Status: ACTIVE | Noted: 2021-07-13

## 2021-07-21 PROCEDURE — 3078F DIAST BP <80 MM HG: CPT | Performed by: INTERNAL MEDICINE

## 2021-07-21 PROCEDURE — 3725F SCREEN DEPRESSION PERFORMED: CPT | Performed by: INTERNAL MEDICINE

## 2021-07-21 PROCEDURE — 3066F NEPHROPATHY DOC TX: CPT | Performed by: INTERNAL MEDICINE

## 2021-07-21 PROCEDURE — 99214 OFFICE O/P EST MOD 30 MIN: CPT | Performed by: INTERNAL MEDICINE

## 2021-07-21 PROCEDURE — 3008F BODY MASS INDEX DOCD: CPT | Performed by: INTERNAL MEDICINE

## 2021-07-21 PROCEDURE — 3074F SYST BP LT 130 MM HG: CPT | Performed by: INTERNAL MEDICINE

## 2021-07-21 PROCEDURE — 1036F TOBACCO NON-USER: CPT | Performed by: INTERNAL MEDICINE

## 2021-07-21 RX ORDER — ERGOCALCIFEROL (VITAMIN D2) 1250 MCG
50000 CAPSULE ORAL
COMMUNITY
Start: 2021-07-02 | End: 2021-09-18

## 2021-07-21 RX ORDER — LISINOPRIL 2.5 MG/1
2.5 TABLET ORAL DAILY
COMMUNITY

## 2021-07-21 RX ORDER — CHOLECALCIFEROL (VITAMIN D3) 1250 MCG
CAPSULE ORAL
COMMUNITY

## 2021-07-21 RX ORDER — GLIPIZIDE 10 MG/1
TABLET, FILM COATED, EXTENDED RELEASE ORAL
COMMUNITY
Start: 2021-06-01 | End: 2021-07-21

## 2021-07-21 NOTE — PROGRESS NOTES
INTERNAL MEDICINE OFFICE VISIT  [facility] AT [address], [address] [place] [address]  Tel: [phone]      NAME: Tanika Olsen  AGE: 64 y o  SEX: female  : 1960   MRN: [id number]    DATE: 2021  TIME: 5:23 PM      Assessment and Plan:  1  Type 2 diabetes mellitus with diabetic polyneuropathy, with long-term current use of insulin ([facility] )   patient was told to decrease the dose of the Lantus to 10 units daily  I will recheck labs in 3 months  - Comprehensive metabolic panel; Future  - Lipid panel; Future  - TSH, 3rd generation; Future  - Hemoglobin A1C; Future    2  Stage 5 chronic kidney disease ([facility] )   follow-up with nephrology    3  Essential hypertension   continue lisinopril and metoprolol    4  Mixed hyperlipidemia   continue atorvastatin    5  Screening for HIV (human immunodeficiency virus)    - HIV 1/2 Antigen/Antibody (4th Generation) w Reflex [facility]; Future      - Counseling Documentation: patient was counseled regarding: diagnostic results, instructions for management, risk factor reductions, prognosis, patient and family education, risks and benefits of treatment options and importance of compliance with treatment  - Medication Side Effects: Adverse side effects of medications were reviewed with the patient/guardian today  Return for follow up visit in  3 months or earlier, if needed  Chief Complaint:  Chief Complaint   Patient presents with    Follow-up     4 month         History of Present Illness:    type 2 diabetes is very well controlled with a hemoglobin A1c of 4 9 and patient does not need so much insulin  She is stage 5 kidney disease now and is not very agreeable to do dialysis  I explained to her to get the fistula at least in place  She is waiting for a kidney transplant but that could take about 5 years from now  She will have to go on dialysis but she does not like the idea    Blood pressure is well controlled She continues to take the atorvastatin and fenofibrate  She refuses all screening tests as she says there is no need for them if she is not going to live for long    He      Active Problem List:  Patient Active Problem List   Diagnosis    Arthropathy    Carpal tunnel syndrome    Mixed hyperlipidemia    Essential hypertension    Tachycardia    Type 2 diabetes mellitus with diabetic polyneuropathy, with long-term current use of insulin (HCC)    Polyneuropathy associated with underlying disease ([facility] )    GERD without esophagitis    Chronic idiopathic constipation    Overweight    Stage 5 chronic kidney disease ([facility] )    Chronic systolic congestive heart failure (HCC)    Nephrotic range proteinuria    Anemia in chronic kidney disease         Past Medical History:  Past Medical History:   Diagnosis Date    Abnormal CBC 6/12/2018    Anxiety     Benign hypertension     Cigarette nicotine dependence without complication 10/53/8526    CTS (carpal tunnel syndrome)     Essential hypertriglyceridemia     Hyperlipidemia     last assessed 12/12/2017    Sebaceous cyst     Type 2 diabetes mellitus ([place] [address] )     last assessed 12/12/2017         Past Surgical History:  Past Surgical History:   Procedure Laterality Date    CHOLECYSTECTOMY LAPAROSCOPIC      KNEE SURGERY Right          Family History:  Family History   Problem Relation Age of Onset    Other Mother         acute subdural hematoma    Coronary artery disease Mother         CABG    Lung cancer Father     Gout Brother     Lung cancer Brother          Social History:  Social History     Socioeconomic History    Marital status: /Civil Union     Spouse name: None    Number of children: None    Years of education: None    Highest education level: None   Occupational History    None   Tobacco Use    Smoking status: Former Smoker     Packs/day: 0 50     Years: 15 00     Pack years: 7 50     Types: Cigarettes     Quit date: 1/1/2019 Years since quittin 5    Smokeless tobacco: Never Used   Vaping Use    Vaping Use: Never used   Substance and Sexual Activity    Alcohol use: No    Drug use: No    Sexual activity: Yes     Partners: Male   Other Topics Concern    None   Social History Narrative    None     Social Determinants of Health     Financial Resource Strain:     Difficulty of Paying Living Expenses:    Food Insecurity:     Worried About Running Out of Food in the Last Year:     Ran Out of Food in the Last Year:    Transportation Needs:     Lack of Transportation (Medical):  Lack of Transportation (Non-Medical):    Physical Activity: Sufficiently Active    Days of Exercise per Week: 4 days    Minutes of Exercise per Session: 40 min   Stress: No Stress Concern Present    Feeling of Stress : Not at all   Social Connections:     Frequency of Communication with Friends and Family:     Frequency of Social Gatherings with Friends and Family:     Attends Latter day Services:     Active Member of Clubs or Organizations:     Attends Club or Organization Meetings:     Marital Status:    Intimate Partner Violence:     Fear of Current or Ex-Partner:     Emotionally Abused:     Physically Abused:     Sexually Abused: Allergies:   Allergies   Allergen Reactions    Wound Dressing Adhesive Rash         Medications:    Current Outpatient Medications:     aspirin 81 MG tablet, Take 1 tablet by mouth daily, Disp: , Rfl:     Aspirin Buf,CaCarb-MgCarb-MgO, 81 MG TABS, Take 81 mg by mouth, Disp: , Rfl:     atorvastatin (LIPITOR) 20 mg tablet, TAKE ONE TABLET BY MOUTH EVERY DAY , Disp: 30 tablet, Rfl: 0    B-D ULTRAFINE III SHORT PEN 31G X 8 MM MISC, use daily as directed for insulin injections , Disp: 100 each, Rfl: 0    Cholecalciferol (Vitamin D3) 1 25 MG (35303 UT) CAPS, Take by mouth, Disp: , Rfl:     ergocalciferol (ERGOCALCIFEROL) 1 25 MG (65549 UT) capsule, Take 50,000 Units by mouth, Disp: , Rfl:    fenofibrate (TRICOR) 145 mg tablet, TAKE ONE TABLET BY MOUTH ONCE A DAY , Disp: 30 tablet, Rfl: 0    furosemide (LASIX) 40 mg tablet, 2 (two) times a day Take 2 tablets in the morning and 1 tablet at night, Disp: , Rfl:     Lantus SoloStar 100 units/mL injection pen, INJECT 30 UNIT UNDER THE SKIN ONCE DAILY AS DIRECTED  (Patient taking differently: 10 Units ), Disp: 15 mL, Rfl: 0    lisinopril (ZESTRIL) 2 5 mg tablet, Take 2 5 mg by mouth daily, Disp: , Rfl:     metoprolol succinate (TOPROL-XL) 50 mg 24 hr tablet, TAKE ONE TABLET BY MOUTH ONCE A DAY , Disp: 90 tablet, Rfl: 0    omeprazole (PriLOSEC) 20 mg delayed release capsule, TAKE ONE CAPSULE BY MOUTH EVERY DAY , Disp: 30 capsule, Rfl: 0    Potassium (POTASSIMIN PO), Take by mouth daily, Disp: , Rfl:       The following portions of the patient's history were reviewed and updated as appropriate: past medical history, past surgical history, family history, social history, allergies, current medications and active problem list       Review of Systems:  Constitutional: Denies fever, chills, weight gain, weight loss, fatigue  Eyes: Denies eye redness, eye discharge, double vision, change in visual acuity  ENT: Denies hearing loss, tinnitus, sneezing, nasal congestion, nasal discharge, sore throat   Respiratory: Denies cough, expectoration, hemoptysis, shortness of breath, wheezing  Cardiovascular: Denies chest pain, palpitations, lower extremity swelling, orthopnea, PND  Gastrointestinal: Denies abdominal pain, heartburn, nausea, vomiting, hematemesis, diarrhea, bloody stools  Genito-Urinary: Denies dysuria, frequency, difficulty in micturition, nocturia, incontinence  Musculoskeletal: Denies back pain, joint pain, muscle pain  Neurologic: Denies confusion, lightheadedness, syncope, headache, focal weakness, sensory changes, seizures  Endocrine: Denies polyuria, polydipsia, temperature intolerance  Allergy and Immunology: Denies hives, insect bite sensitivity  Hematological and Lymphatic: Denies bleeding problems, swollen glands   Psychological: Denies depression, suicidal ideation, anxiety, panic, mood swings  Dermatological: Denies pruritus, rash, skin lesion changes      Vitals:  Vitals:    07/21/21 1659   BP: 110/70   Pulse: 95   Temp: 97 8 °F (36 6 °C)   SpO2: 98%       Body mass index is 23 93 kg/m²  Weight (last 2 days)     Date/Time   Weight    07/21/21 1659   63 2 (139 4)                Physical Examination:  General: Patient is not in acute distress  Awake, alert, responding to commands  No weight gain or loss  Head: Normocephalic  Atraumatic  Eyes: Conjunctiva and lids with no swelling, erythema or discharge  Both pupils normal sized, round and reactive to light  Sclera nonicteric  ENT: External examination of nose and ear normal  Otoscopic examination shows translucent tympanic membranes with patent canals without erythema  Oropharynx moist with no erythema, edema, exudate or lesions  Neck: Supple  JVP not raised  Trachea midline  No masses  No thyromegaly  Lungs: No signs of increased work of breathing or respiratory distress  Bilateral bronchovascular breath sounds with no crackles or rhonchi  Chest wall: No tenderness  Cardiovascular: Normal PMI  No thrills  Regular rate and rhythm  S1 and S2 normal  No murmur, rub or gallop  Gastrointestinal: Abdomen soft, nontender  No guarding or rigidity  Liver and spleen not palpable  Bowel sounds present  Neurologic: Cranial nerves II-XII intact  Cortical functions normal  Motor system - Reflexes 2+ and symmetrical  Sensations normal  Musculoskeletal: Gait normal  No joint tenderness  Integumentary: Skin normal with no rash or lesions  Lymphatic: No palpable lymph nodes in neck, axilla or groin  Extremities: No clubbing, cyanosis, edema or varicosities  Psychological: Judgement and insight normal   Seems to be very sad    Patient's shoes and socks removed  Right Foot/Ankle   Right Foot Inspection  Skin Exam: skin normal and skin intact no dry skin, no warmth, no callus, no erythema, no maceration, no abnormal color, no pre-ulcer, no ulcer and no callus                          Toe Exam: ROM and strength within normal limits  Sensory   Vibration: intact  Proprioception: intact   Monofilament testing: intact  Vascular    The right DP pulse is 2+  The right PT pulse is 2+  Left Foot/Ankle  Left Foot Inspection  Skin Exam: skin normal and skin intactno dry skin, no warmth, no erythema, no maceration, normal color, no pre-ulcer, no ulcer and no callus                         Toe Exam: ROM and strength within normal limits                   Sensory   Vibration: intact  Proprioception: intact  Monofilament: intact  Vascular    The left DP pulse is 2+  The left PT pulse is 2+  Assign Risk Category:  No deformity present; No loss of protective sensation;  No weak pulses       Risk: 0    Laboratory Results:  CBC with diff:   Lab Results   Component Value Date    WBC 8 90 06/28/2021    WBC 9 0 04/21/2015    RBC 3 37 (L) 06/28/2021    RBC 3 65 (L) 04/21/2015    HGB 9 3 (L) 06/28/2021    HGB 10 8 (L) 04/21/2015    HCT 30 3 (L) 06/28/2021    HCT 32 2 (L) 04/21/2015    MCV 90 06/28/2021    MCV 88 04/21/2015    MCH 27 6 06/28/2021    MCH 29 6 04/21/2015    RDW 13 1 06/28/2021    RDW 10 7 (L) 04/21/2015     (H) 06/28/2021     (H) 04/21/2015       CMP:  Lab Results   Component Value Date    CREATININE 3 60 (H) 07/12/2021    CREATININE 1 2 (H) 04/21/2015    BUN 59 (H) 07/12/2021    BUN 30 (H) 04/21/2015     04/21/2015    K 3 6 07/12/2021    K 4 8 04/21/2015     (H) 07/12/2021     04/21/2015    CO2 22 07/12/2021    CO2 24 9 04/21/2015    GLUCOSE 293 (H) 04/21/2015    PROT 6 8 04/21/2015    ALKPHOS 62 06/28/2021    ALKPHOS 45 (L) 04/21/2015    ALT 15 06/28/2021    ALT 33 04/21/2015    AST 17 06/28/2021    AST 16 04/21/2015       Lab Results   Component Value Date    HGBA1C 4 9 06/28/2021    HGBA1C 11  2 (H) 04/21/2015    MG 2 4 06/28/2021    PHOS 4 9 (H) 07/12/2021       No results found for: TROPONINI, CKMB, CKTOTAL    Lipid Profile:   Lab Results   Component Value Date    CHOL 318 04/21/2015     Lab Results   Component Value Date    HDL 27 (L) 06/28/2021    HDL 26 (L) 01/19/2021     Lab Results   Component Value Date    LDLCALC 85 06/28/2021    [address] 88 01/19/2021     Lab Results   Component Value Date    TRIG 262 (H) 06/28/2021    TRIG 284 (H) 01/19/2021       Imaging Results:  US kidney and bladder  Narrative: RENAL ULTRASOUND    INDICATION:   N18 4: Chronic kidney disease, stage 4 (severe)  COMPARISON: None    TECHNIQUE:   Ultrasound of the retroperitoneum was performed with a curvilinear transducer utilizing volumetric sweeps and still imaging techniques  FINDINGS:    KIDNEYS:  Symmetric and normal size  Right kidney:  10 2 x 5 1 x 6 3 cm  Left kidney:  11 2 x 5 6 x 5 8 cm  Right kidney  Normal echogenicity and contour  No suspicious masses detected  No hydronephrosis  No shadowing calculi  No perinephric fluid collections  Left kidney  Normal echogenicity and contour  No suspicious masses detected  There is a 1 2 cm exophytic cyst in the lower pole  No hydronephrosis  No shadowing calculi  No perinephric fluid collections  URETERS:  Nonvisualized  BLADDER:   Underdistended  No focal mass  Ureteral jets are not visualized during the course of examination  Impression: Small simple left renal cortical cyst     Otherwise, unremarkable examination       Workstation performed: [id number]       Health Maintenance:  Health Maintenance   Topic Date Due    Pneumococcal Vaccine: Pediatrics (0 to 5 Years) and At-Risk Patients (6 to 59 Years) (1 of 4 - PCV13) Never done    DM Eye Exam  Never done    HIV Screening  Never done    Annual Physical  Never done    DTaP,Tdap,and Td Vaccines (1 - Tdap) Never done    Cervical Cancer Screening  Never done    Breast Cancer Screening: Mammogram  Never done    Colorectal Cancer Screening  Never done    Influenza Vaccine (1) 09/01/2021    Diabetic Foot Exam  09/02/2021    HEMOGLOBIN A1C  12/28/2021    Depression Screening PHQ  07/21/2022    BMI: Adult  07/21/2022    Hepatitis C Screening  Completed    COVID-19 Vaccine  Completed    HIB Vaccine  Aged Out    Hepatitis B Vaccine  Aged Out    IPV Vaccine  Aged Out    Hepatitis A Vaccine  Aged Out    Meningococcal ACWY Vaccine  Aged Out    HPV Vaccine  Aged Out     Immunization History   Administered Date(s) Administered    Influenza, recombinant, quadrivalent,injectable, preservative free 10/23/2018    SARS-CoV-2 / COVID-19 mRNA IM (Joel Mcclure) 05/13/2021, 06/10/2021         Ligia Chowdhury MD  7/21/2021,5:23 PM

## 2021-08-30 ENCOUNTER — VBI (OUTPATIENT)
Dept: ADMINISTRATIVE | Facility: OTHER | Age: 61
End: 2021-08-30

## 2021-09-20 DIAGNOSIS — I10 ESSENTIAL HYPERTENSION: ICD-10-CM

## 2021-09-20 RX ORDER — METOPROLOL SUCCINATE 50 MG/1
TABLET, EXTENDED RELEASE ORAL
Qty: 90 TABLET | Refills: 0 | Status: SHIPPED | OUTPATIENT
Start: 2021-09-20 | End: 2021-12-19

## 2021-10-19 DIAGNOSIS — Z79.4 TYPE 2 DIABETES MELLITUS WITH DIABETIC POLYNEUROPATHY, WITH LONG-TERM CURRENT USE OF INSULIN (HCC): ICD-10-CM

## 2021-10-19 DIAGNOSIS — E11.42 TYPE 2 DIABETES MELLITUS WITH DIABETIC POLYNEUROPATHY, WITH LONG-TERM CURRENT USE OF INSULIN (HCC): ICD-10-CM

## 2021-10-19 RX ORDER — INSULIN GLARGINE 100 [IU]/ML
INJECTION, SOLUTION SUBCUTANEOUS
Qty: 15 ML | Refills: 0 | Status: SHIPPED | OUTPATIENT
Start: 2021-10-19 | End: 2022-02-13

## 2021-11-16 DIAGNOSIS — E78.2 MIXED HYPERLIPIDEMIA: ICD-10-CM

## 2021-11-16 DIAGNOSIS — K21.9 GERD WITHOUT ESOPHAGITIS: ICD-10-CM

## 2021-11-16 RX ORDER — FENOFIBRATE 145 MG/1
TABLET, COATED ORAL
Qty: 30 TABLET | Refills: 0 | Status: SHIPPED | OUTPATIENT
Start: 2021-11-16

## 2021-11-16 RX ORDER — OMEPRAZOLE 20 MG/1
CAPSULE, DELAYED RELEASE ORAL
Qty: 30 CAPSULE | Refills: 0 | Status: SHIPPED | OUTPATIENT
Start: 2021-11-16 | End: 2021-12-19

## 2021-11-16 RX ORDER — ATORVASTATIN CALCIUM 20 MG/1
TABLET, FILM COATED ORAL
Qty: 30 TABLET | Refills: 0 | Status: SHIPPED | OUTPATIENT
Start: 2021-11-16 | End: 2021-12-19

## 2021-12-19 DIAGNOSIS — E78.2 MIXED HYPERLIPIDEMIA: ICD-10-CM

## 2021-12-19 DIAGNOSIS — I10 ESSENTIAL HYPERTENSION: ICD-10-CM

## 2021-12-19 DIAGNOSIS — K21.9 GERD WITHOUT ESOPHAGITIS: ICD-10-CM

## 2021-12-19 RX ORDER — OMEPRAZOLE 20 MG/1
CAPSULE, DELAYED RELEASE ORAL
Qty: 30 CAPSULE | Refills: 0 | Status: SHIPPED | OUTPATIENT
Start: 2021-12-19 | End: 2022-01-18

## 2021-12-19 RX ORDER — METOPROLOL SUCCINATE 50 MG/1
TABLET, EXTENDED RELEASE ORAL
Qty: 90 TABLET | Refills: 0 | Status: SHIPPED | OUTPATIENT
Start: 2021-12-19 | End: 2022-03-26

## 2021-12-19 RX ORDER — ATORVASTATIN CALCIUM 20 MG/1
TABLET, FILM COATED ORAL
Qty: 30 TABLET | Refills: 0 | Status: SHIPPED | OUTPATIENT
Start: 2021-12-19 | End: 2022-01-18

## 2022-01-18 DIAGNOSIS — K21.9 GERD WITHOUT ESOPHAGITIS: ICD-10-CM

## 2022-01-18 DIAGNOSIS — E78.2 MIXED HYPERLIPIDEMIA: ICD-10-CM

## 2022-01-18 RX ORDER — OMEPRAZOLE 20 MG/1
CAPSULE, DELAYED RELEASE ORAL
Qty: 30 CAPSULE | Refills: 0 | Status: SHIPPED | OUTPATIENT
Start: 2022-01-18 | End: 2022-02-13

## 2022-01-18 RX ORDER — ATORVASTATIN CALCIUM 20 MG/1
TABLET, FILM COATED ORAL
Qty: 30 TABLET | Refills: 0 | Status: SHIPPED | OUTPATIENT
Start: 2022-01-18 | End: 2022-02-13

## 2022-02-13 DIAGNOSIS — E78.2 MIXED HYPERLIPIDEMIA: ICD-10-CM

## 2022-02-13 DIAGNOSIS — K21.9 GERD WITHOUT ESOPHAGITIS: ICD-10-CM

## 2022-02-13 DIAGNOSIS — Z79.4 TYPE 2 DIABETES MELLITUS WITH DIABETIC POLYNEUROPATHY, WITH LONG-TERM CURRENT USE OF INSULIN (HCC): ICD-10-CM

## 2022-02-13 DIAGNOSIS — E11.42 TYPE 2 DIABETES MELLITUS WITH DIABETIC POLYNEUROPATHY, WITH LONG-TERM CURRENT USE OF INSULIN (HCC): ICD-10-CM

## 2022-02-13 RX ORDER — OMEPRAZOLE 20 MG/1
CAPSULE, DELAYED RELEASE ORAL
Qty: 30 CAPSULE | Refills: 0 | Status: SHIPPED | OUTPATIENT
Start: 2022-02-13 | End: 2022-03-26

## 2022-02-13 RX ORDER — ATORVASTATIN CALCIUM 20 MG/1
TABLET, FILM COATED ORAL
Qty: 30 TABLET | Refills: 0 | Status: SHIPPED | OUTPATIENT
Start: 2022-02-13 | End: 2022-03-26

## 2022-02-13 RX ORDER — INSULIN GLARGINE 100 [IU]/ML
INJECTION, SOLUTION SUBCUTANEOUS
Qty: 15 ML | Refills: 0 | Status: SHIPPED | OUTPATIENT
Start: 2022-02-13 | End: 2022-03-26

## 2022-03-08 ENCOUNTER — VBI (OUTPATIENT)
Dept: ADMINISTRATIVE | Facility: OTHER | Age: 62
End: 2022-03-08

## 2022-03-25 DIAGNOSIS — E78.2 MIXED HYPERLIPIDEMIA: ICD-10-CM

## 2022-03-25 DIAGNOSIS — E11.42 TYPE 2 DIABETES MELLITUS WITH DIABETIC POLYNEUROPATHY, WITH LONG-TERM CURRENT USE OF INSULIN (HCC): ICD-10-CM

## 2022-03-25 DIAGNOSIS — I10 ESSENTIAL HYPERTENSION: ICD-10-CM

## 2022-03-25 DIAGNOSIS — K21.9 GERD WITHOUT ESOPHAGITIS: ICD-10-CM

## 2022-03-25 DIAGNOSIS — Z79.4 TYPE 2 DIABETES MELLITUS WITH DIABETIC POLYNEUROPATHY, WITH LONG-TERM CURRENT USE OF INSULIN (HCC): ICD-10-CM

## 2022-03-26 RX ORDER — OMEPRAZOLE 20 MG/1
CAPSULE, DELAYED RELEASE ORAL
Qty: 30 CAPSULE | Refills: 0 | Status: SHIPPED | OUTPATIENT
Start: 2022-03-26 | End: 2022-04-25

## 2022-03-26 RX ORDER — METOPROLOL SUCCINATE 50 MG/1
TABLET, EXTENDED RELEASE ORAL
Qty: 90 TABLET | Refills: 0 | Status: SHIPPED | OUTPATIENT
Start: 2022-03-26 | End: 2022-06-16

## 2022-03-26 RX ORDER — INSULIN GLARGINE 100 [IU]/ML
INJECTION, SOLUTION SUBCUTANEOUS
Qty: 15 ML | Refills: 0 | Status: SHIPPED | OUTPATIENT
Start: 2022-03-26

## 2022-03-26 RX ORDER — ATORVASTATIN CALCIUM 20 MG/1
TABLET, FILM COATED ORAL
Qty: 30 TABLET | Refills: 0 | Status: SHIPPED | OUTPATIENT
Start: 2022-03-26 | End: 2022-04-25

## 2022-04-24 DIAGNOSIS — K21.9 GERD WITHOUT ESOPHAGITIS: ICD-10-CM

## 2022-04-24 DIAGNOSIS — E78.2 MIXED HYPERLIPIDEMIA: ICD-10-CM

## 2022-04-25 RX ORDER — OMEPRAZOLE 20 MG/1
CAPSULE, DELAYED RELEASE ORAL
Qty: 30 CAPSULE | Refills: 0 | Status: SHIPPED | OUTPATIENT
Start: 2022-04-25 | End: 2022-05-20

## 2022-04-25 RX ORDER — ATORVASTATIN CALCIUM 20 MG/1
TABLET, FILM COATED ORAL
Qty: 30 TABLET | Refills: 0 | Status: SHIPPED | OUTPATIENT
Start: 2022-04-25 | End: 2022-05-20

## 2022-05-20 DIAGNOSIS — K21.9 GERD WITHOUT ESOPHAGITIS: ICD-10-CM

## 2022-05-20 DIAGNOSIS — E78.2 MIXED HYPERLIPIDEMIA: ICD-10-CM

## 2022-05-20 RX ORDER — ATORVASTATIN CALCIUM 20 MG/1
TABLET, FILM COATED ORAL
Qty: 30 TABLET | Refills: 0 | Status: SHIPPED | OUTPATIENT
Start: 2022-05-20 | End: 2022-06-16

## 2022-05-20 RX ORDER — OMEPRAZOLE 20 MG/1
CAPSULE, DELAYED RELEASE ORAL
Qty: 30 CAPSULE | Refills: 0 | Status: SHIPPED | OUTPATIENT
Start: 2022-05-20 | End: 2022-06-16

## 2022-06-16 DIAGNOSIS — E78.2 MIXED HYPERLIPIDEMIA: ICD-10-CM

## 2022-06-16 DIAGNOSIS — I10 ESSENTIAL HYPERTENSION: ICD-10-CM

## 2022-06-16 DIAGNOSIS — K21.9 GERD WITHOUT ESOPHAGITIS: ICD-10-CM

## 2022-06-16 RX ORDER — METOPROLOL SUCCINATE 50 MG/1
TABLET, EXTENDED RELEASE ORAL
Qty: 90 TABLET | Refills: 0 | Status: SHIPPED | OUTPATIENT
Start: 2022-06-16

## 2022-06-16 RX ORDER — OMEPRAZOLE 20 MG/1
CAPSULE, DELAYED RELEASE ORAL
Qty: 30 CAPSULE | Refills: 0 | Status: SHIPPED | OUTPATIENT
Start: 2022-06-16 | End: 2022-07-15

## 2022-06-16 RX ORDER — ATORVASTATIN CALCIUM 20 MG/1
TABLET, FILM COATED ORAL
Qty: 30 TABLET | Refills: 0 | Status: SHIPPED | OUTPATIENT
Start: 2022-06-16 | End: 2022-07-19 | Stop reason: SDUPTHER

## 2022-07-15 DIAGNOSIS — K21.9 GERD WITHOUT ESOPHAGITIS: ICD-10-CM

## 2022-07-15 RX ORDER — OMEPRAZOLE 20 MG/1
CAPSULE, DELAYED RELEASE ORAL
Qty: 30 CAPSULE | Refills: 0 | Status: SHIPPED | OUTPATIENT
Start: 2022-07-15 | End: 2022-08-30

## 2022-07-19 DIAGNOSIS — E78.2 MIXED HYPERLIPIDEMIA: ICD-10-CM

## 2022-07-19 NOTE — TELEPHONE ENCOUNTER
Medication Refill Request     Name atorvastatin (LIPITOR) 20 mg tablet  Dose/Frequency TAKE ONE TABLET BY MOUTH EVERY DAY  Quantity 30 tablets  Verified pharmacy   [x]  Verified ordering Provider   [x]  Does patient have enough for the next 3 days?  Yes [] No [x]

## 2022-07-20 RX ORDER — ATORVASTATIN CALCIUM 20 MG/1
20 TABLET, FILM COATED ORAL DAILY
Qty: 90 TABLET | Refills: 3 | Status: SHIPPED | OUTPATIENT
Start: 2022-07-20

## 2022-08-09 ENCOUNTER — APPOINTMENT (OUTPATIENT)
Dept: LAB | Facility: CLINIC | Age: 62
End: 2022-08-09
Payer: COMMERCIAL

## 2022-08-09 DIAGNOSIS — I10 ESSENTIAL HYPERTENSION: ICD-10-CM

## 2022-08-09 DIAGNOSIS — N18.4 CHRONIC KIDNEY DISEASE, STAGE IV (SEVERE) (HCC): ICD-10-CM

## 2022-08-09 LAB
ALBUMIN SERPL BCP-MCNC: 2.4 G/DL (ref 3.5–5)
ANION GAP SERPL CALCULATED.3IONS-SCNC: 7 MMOL/L (ref 4–13)
BASOPHILS # BLD AUTO: 0.04 THOUSANDS/ΜL (ref 0–0.1)
BASOPHILS NFR BLD AUTO: 1 % (ref 0–1)
BUN SERPL-MCNC: 40 MG/DL (ref 5–25)
CALCIUM ALBUM COR SERPL-MCNC: 9.4 MG/DL (ref 8.3–10.1)
CALCIUM SERPL-MCNC: 8.1 MG/DL (ref 8.3–10.1)
CHLORIDE SERPL-SCNC: 113 MMOL/L (ref 96–108)
CO2 SERPL-SCNC: 23 MMOL/L (ref 21–32)
CREAT SERPL-MCNC: 3.11 MG/DL (ref 0.6–1.3)
EOSINOPHIL # BLD AUTO: 0.13 THOUSAND/ΜL (ref 0–0.61)
EOSINOPHIL NFR BLD AUTO: 2 % (ref 0–6)
ERYTHROCYTE [DISTWIDTH] IN BLOOD BY AUTOMATED COUNT: 16 % (ref 11.6–15.1)
FERRITIN SERPL-MCNC: 14 NG/ML (ref 8–388)
GFR SERPL CREATININE-BSD FRML MDRD: 15 ML/MIN/1.73SQ M
GLUCOSE P FAST SERPL-MCNC: 131 MG/DL (ref 65–99)
HCT VFR BLD AUTO: 26.2 % (ref 34.8–46.1)
HGB BLD-MCNC: 7.3 G/DL (ref 11.5–15.4)
IMM GRANULOCYTES # BLD AUTO: 0.03 THOUSAND/UL (ref 0–0.2)
IMM GRANULOCYTES NFR BLD AUTO: 0 % (ref 0–2)
LYMPHOCYTES # BLD AUTO: 1.1 THOUSANDS/ΜL (ref 0.6–4.47)
LYMPHOCYTES NFR BLD AUTO: 16 % (ref 14–44)
MAGNESIUM SERPL-MCNC: 2.3 MG/DL (ref 1.6–2.6)
MCH RBC QN AUTO: 23.5 PG (ref 26.8–34.3)
MCHC RBC AUTO-ENTMCNC: 27.9 G/DL (ref 31.4–37.4)
MCV RBC AUTO: 84 FL (ref 82–98)
MONOCYTES # BLD AUTO: 0.44 THOUSAND/ΜL (ref 0.17–1.22)
MONOCYTES NFR BLD AUTO: 6 % (ref 4–12)
NEUTROPHILS # BLD AUTO: 5.31 THOUSANDS/ΜL (ref 1.85–7.62)
NEUTS SEG NFR BLD AUTO: 75 % (ref 43–75)
NRBC BLD AUTO-RTO: 0 /100 WBCS
PHOSPHATE SERPL-MCNC: 4.3 MG/DL (ref 2.3–4.1)
PLATELET # BLD AUTO: 333 THOUSANDS/UL (ref 149–390)
PMV BLD AUTO: 10.6 FL (ref 8.9–12.7)
POTASSIUM SERPL-SCNC: 3 MMOL/L (ref 3.5–5.3)
PTH-INTACT SERPL-MCNC: 347.8 PG/ML (ref 18.4–80.1)
RBC # BLD AUTO: 3.11 MILLION/UL (ref 3.81–5.12)
SODIUM SERPL-SCNC: 143 MMOL/L (ref 135–147)
WBC # BLD AUTO: 7.05 THOUSAND/UL (ref 4.31–10.16)

## 2022-08-09 PROCEDURE — 85025 COMPLETE CBC W/AUTO DIFF WBC: CPT

## 2022-08-09 PROCEDURE — 82728 ASSAY OF FERRITIN: CPT

## 2022-08-09 PROCEDURE — 3066F NEPHROPATHY DOC TX: CPT | Performed by: INTERNAL MEDICINE

## 2022-08-09 PROCEDURE — 36415 COLL VENOUS BLD VENIPUNCTURE: CPT

## 2022-08-09 PROCEDURE — 83970 ASSAY OF PARATHORMONE: CPT

## 2022-08-09 PROCEDURE — 83735 ASSAY OF MAGNESIUM: CPT

## 2022-08-09 PROCEDURE — 80069 RENAL FUNCTION PANEL: CPT

## 2022-08-10 ENCOUNTER — TELEPHONE (OUTPATIENT)
Dept: INTERNAL MEDICINE CLINIC | Facility: CLINIC | Age: 62
End: 2022-08-10

## 2022-08-10 DIAGNOSIS — E78.2 MIXED HYPERLIPIDEMIA: Primary | ICD-10-CM

## 2022-08-10 DIAGNOSIS — Z79.4 TYPE 2 DIABETES MELLITUS WITH DIABETIC POLYNEUROPATHY, WITH LONG-TERM CURRENT USE OF INSULIN (HCC): ICD-10-CM

## 2022-08-10 DIAGNOSIS — E11.42 TYPE 2 DIABETES MELLITUS WITH DIABETIC POLYNEUROPATHY, WITH LONG-TERM CURRENT USE OF INSULIN (HCC): ICD-10-CM

## 2022-08-29 ENCOUNTER — APPOINTMENT (OUTPATIENT)
Dept: LAB | Facility: CLINIC | Age: 62
End: 2022-08-29
Payer: COMMERCIAL

## 2022-08-29 DIAGNOSIS — E11.42 TYPE 2 DIABETES MELLITUS WITH DIABETIC POLYNEUROPATHY, WITH LONG-TERM CURRENT USE OF INSULIN (HCC): ICD-10-CM

## 2022-08-29 DIAGNOSIS — Z79.4 TYPE 2 DIABETES MELLITUS WITH DIABETIC POLYNEUROPATHY, WITH LONG-TERM CURRENT USE OF INSULIN (HCC): ICD-10-CM

## 2022-08-29 LAB
ALBUMIN SERPL BCP-MCNC: 2.5 G/DL (ref 3.5–5)
ALP SERPL-CCNC: 157 U/L (ref 46–116)
ALT SERPL W P-5'-P-CCNC: 15 U/L (ref 12–78)
ANION GAP SERPL CALCULATED.3IONS-SCNC: 8 MMOL/L (ref 4–13)
AST SERPL W P-5'-P-CCNC: 11 U/L (ref 5–45)
BASOPHILS # BLD AUTO: 0.04 THOUSANDS/ΜL (ref 0–0.1)
BASOPHILS NFR BLD AUTO: 1 % (ref 0–1)
BILIRUB SERPL-MCNC: 0.48 MG/DL (ref 0.2–1)
BUN SERPL-MCNC: 50 MG/DL (ref 5–25)
CALCIUM ALBUM COR SERPL-MCNC: 9.4 MG/DL (ref 8.3–10.1)
CALCIUM SERPL-MCNC: 8.2 MG/DL (ref 8.3–10.1)
CHLORIDE SERPL-SCNC: 114 MMOL/L (ref 96–108)
CHOLEST SERPL-MCNC: 146 MG/DL
CO2 SERPL-SCNC: 22 MMOL/L (ref 21–32)
CREAT SERPL-MCNC: 3.69 MG/DL (ref 0.6–1.3)
EOSINOPHIL # BLD AUTO: 0.05 THOUSAND/ΜL (ref 0–0.61)
EOSINOPHIL NFR BLD AUTO: 1 % (ref 0–6)
ERYTHROCYTE [DISTWIDTH] IN BLOOD BY AUTOMATED COUNT: 17.1 % (ref 11.6–15.1)
GFR SERPL CREATININE-BSD FRML MDRD: 12 ML/MIN/1.73SQ M
GLUCOSE P FAST SERPL-MCNC: 123 MG/DL (ref 65–99)
HCT VFR BLD AUTO: 25.7 % (ref 34.8–46.1)
HDLC SERPL-MCNC: 31 MG/DL
HGB BLD-MCNC: 7.2 G/DL (ref 11.5–15.4)
IMM GRANULOCYTES # BLD AUTO: 0.03 THOUSAND/UL (ref 0–0.2)
IMM GRANULOCYTES NFR BLD AUTO: 0 % (ref 0–2)
LDLC SERPL CALC-MCNC: 92 MG/DL (ref 0–100)
LYMPHOCYTES # BLD AUTO: 0.98 THOUSANDS/ΜL (ref 0.6–4.47)
LYMPHOCYTES NFR BLD AUTO: 15 % (ref 14–44)
MCH RBC QN AUTO: 23 PG (ref 26.8–34.3)
MCHC RBC AUTO-ENTMCNC: 28 G/DL (ref 31.4–37.4)
MCV RBC AUTO: 82 FL (ref 82–98)
MONOCYTES # BLD AUTO: 0.51 THOUSAND/ΜL (ref 0.17–1.22)
MONOCYTES NFR BLD AUTO: 8 % (ref 4–12)
NEUTROPHILS # BLD AUTO: 5.15 THOUSANDS/ΜL (ref 1.85–7.62)
NEUTS SEG NFR BLD AUTO: 75 % (ref 43–75)
NONHDLC SERPL-MCNC: 115 MG/DL
NRBC BLD AUTO-RTO: 1 /100 WBCS
PLATELET # BLD AUTO: 320 THOUSANDS/UL (ref 149–390)
PMV BLD AUTO: 11.1 FL (ref 8.9–12.7)
POTASSIUM SERPL-SCNC: 3.3 MMOL/L (ref 3.5–5.3)
PROT SERPL-MCNC: 6.6 G/DL (ref 6.4–8.4)
RBC # BLD AUTO: 3.13 MILLION/UL (ref 3.81–5.12)
SODIUM SERPL-SCNC: 144 MMOL/L (ref 135–147)
TRIGL SERPL-MCNC: 115 MG/DL
TSH SERPL DL<=0.05 MIU/L-ACNC: 4.29 UIU/ML (ref 0.45–4.5)
WBC # BLD AUTO: 6.76 THOUSAND/UL (ref 4.31–10.16)

## 2022-08-29 PROCEDURE — 83036 HEMOGLOBIN GLYCOSYLATED A1C: CPT

## 2022-08-29 PROCEDURE — 85025 COMPLETE CBC W/AUTO DIFF WBC: CPT

## 2022-08-29 PROCEDURE — 84443 ASSAY THYROID STIM HORMONE: CPT

## 2022-08-29 PROCEDURE — 36415 COLL VENOUS BLD VENIPUNCTURE: CPT

## 2022-08-29 PROCEDURE — 80061 LIPID PANEL: CPT

## 2022-08-29 PROCEDURE — 80053 COMPREHEN METABOLIC PANEL: CPT

## 2022-08-30 ENCOUNTER — OFFICE VISIT (OUTPATIENT)
Dept: INTERNAL MEDICINE CLINIC | Facility: CLINIC | Age: 62
End: 2022-08-30
Payer: COMMERCIAL

## 2022-08-30 VITALS
WEIGHT: 151.2 LBS | HEART RATE: 74 BPM | HEIGHT: 64 IN | DIASTOLIC BLOOD PRESSURE: 84 MMHG | OXYGEN SATURATION: 99 % | TEMPERATURE: 97.4 F | SYSTOLIC BLOOD PRESSURE: 160 MMHG | RESPIRATION RATE: 16 BRPM | BODY MASS INDEX: 25.81 KG/M2

## 2022-08-30 DIAGNOSIS — Z79.4 TYPE 2 DIABETES MELLITUS WITH DIABETIC POLYNEUROPATHY, WITH LONG-TERM CURRENT USE OF INSULIN (HCC): Primary | ICD-10-CM

## 2022-08-30 DIAGNOSIS — E78.2 MIXED HYPERLIPIDEMIA: ICD-10-CM

## 2022-08-30 DIAGNOSIS — E66.3 OVERWEIGHT: ICD-10-CM

## 2022-08-30 DIAGNOSIS — E11.42 TYPE 2 DIABETES MELLITUS WITH DIABETIC POLYNEUROPATHY, WITH LONG-TERM CURRENT USE OF INSULIN (HCC): Primary | ICD-10-CM

## 2022-08-30 DIAGNOSIS — I50.22 CHRONIC SYSTOLIC CONGESTIVE HEART FAILURE (HCC): ICD-10-CM

## 2022-08-30 DIAGNOSIS — N18.5 ANEMIA IN STAGE 5 CHRONIC KIDNEY DISEASE, NOT ON CHRONIC DIALYSIS (HCC): ICD-10-CM

## 2022-08-30 DIAGNOSIS — Z12.4 SCREENING FOR CERVICAL CANCER: ICD-10-CM

## 2022-08-30 DIAGNOSIS — I10 ESSENTIAL HYPERTENSION: ICD-10-CM

## 2022-08-30 DIAGNOSIS — N18.5 STAGE 5 CHRONIC KIDNEY DISEASE (HCC): ICD-10-CM

## 2022-08-30 DIAGNOSIS — Z11.4 SCREENING FOR HIV (HUMAN IMMUNODEFICIENCY VIRUS): ICD-10-CM

## 2022-08-30 DIAGNOSIS — Z12.31 ENCOUNTER FOR SCREENING MAMMOGRAM FOR BREAST CANCER: ICD-10-CM

## 2022-08-30 DIAGNOSIS — R11.0 NAUSEA: ICD-10-CM

## 2022-08-30 DIAGNOSIS — D63.1 ANEMIA IN STAGE 5 CHRONIC KIDNEY DISEASE, NOT ON CHRONIC DIALYSIS (HCC): ICD-10-CM

## 2022-08-30 DIAGNOSIS — R05.9 COUGH: ICD-10-CM

## 2022-08-30 LAB
EST. AVERAGE GLUCOSE BLD GHB EST-MCNC: 157 MG/DL
HBA1C MFR BLD: 7.1 %

## 2022-08-30 PROCEDURE — 3051F HG A1C>EQUAL 7.0%<8.0%: CPT | Performed by: INTERNAL MEDICINE

## 2022-08-30 PROCEDURE — 99215 OFFICE O/P EST HI 40 MIN: CPT | Performed by: INTERNAL MEDICINE

## 2022-08-30 PROCEDURE — 3725F SCREEN DEPRESSION PERFORMED: CPT | Performed by: INTERNAL MEDICINE

## 2022-08-30 RX ORDER — METOPROLOL SUCCINATE 100 MG/1
100 TABLET, EXTENDED RELEASE ORAL DAILY
Qty: 90 TABLET | Refills: 2 | Status: SHIPPED | OUTPATIENT
Start: 2022-08-30

## 2022-08-30 RX ORDER — SPIRONOLACTONE 25 MG/1
25 TABLET ORAL
COMMUNITY
Start: 2022-08-10 | End: 2022-11-08

## 2022-08-30 RX ORDER — SPIRONOLACTONE 25 MG/1
25 TABLET ORAL DAILY
COMMUNITY

## 2022-08-30 RX ORDER — VALSARTAN 80 MG/1
80 TABLET ORAL DAILY
COMMUNITY
End: 2022-08-30

## 2022-08-30 RX ORDER — ONDANSETRON 4 MG/1
4 TABLET, FILM COATED ORAL EVERY 8 HOURS PRN
Qty: 20 TABLET | Refills: 0 | Status: SHIPPED | OUTPATIENT
Start: 2022-08-30

## 2022-08-30 RX ORDER — VALSARTAN 80 MG/1
80 TABLET ORAL
COMMUNITY
Start: 2022-08-10 | End: 2022-08-30

## 2022-08-30 NOTE — PROGRESS NOTES
INTERNAL MEDICINE OFFICE VISIT  [facility] AT [address]  Tel: [phone]      NAME: Oleg Salguero  AGE: 58 y o  SEX: female  : 1960   MRN: [id number]    DATE: 2022  TIME: 3:44 PM      Assessment and Plan:  1  Type 2 diabetes mellitus with diabetic polyneuropathy, with long-term current use of insulin ([facility] )  She was advised to continue Lantus 10 units daily for now  The A1c is stable at 7 1   - IRIS Diabetic eye exam    2  Essential hypertension    Metoprolol was increased to 100 mg daily and valsartan was discontinued as the patient says it caused her diarrhea  Previously the lisinopril was discontinued because of a dry cough by her nephrologist   She will continue to monitor blood pressure and let me know if it still remains high  - metoprolol succinate (TOPROL-XL) 100 mg 24 hr tablet; Take 1 tablet (100 mg total) by mouth daily  Dispense: 90 tablet; Refill: 2    3  Mixed hyperlipidemia  Continue atorvastatin and fenofibrate    4  Stage 5 chronic kidney disease ([facility] )   follow-up with nephrology    5  Anemia in stage 5 chronic kidney disease, not on chronic dialysis ([place] [address] )   follow-up CBC  She was advised by her nephrologist to see a hematologist for the severe anemia    6  Chronic systolic congestive heart failure (HCC)   continue furosemide and spironolactone    7  Cough   will get back to her with the results of the the chest x-ray  - XR chest pa & lateral; Future    8  Overweight  BMI Counseling: Body mass index is 25 95 kg/m²  The BMI is above normal  Nutrition recommendations include decreasing portion sizes, encouraging healthy choices of fruits and vegetables and moderation in carbohydrate intake  Exercise recommendations include moderate physical activity 150 minutes/week  Rationale for BMI follow-up plan is due to patient being overweight or obese       Depression Screening and Follow-up Plan: Patient was screened for depression during today's encounter  They screened negative with a PHQ-2 score of 0         9  Screening for cervical cancer      10  Encounter for screening mammogram for breast cancer      11  Screening for HIV (human immunodeficiency virus)      12  Nausea    - ondansetron (ZOFRAN) 4 mg tablet; Take 1 tablet (4 mg total) by mouth every 8 (eight) hours as needed for nausea or vomiting  Dispense: 20 tablet; Refill: 0      - Counseling Documentation: patient was counseled regarding: diagnostic results, instructions for management, risk factor reductions, prognosis, patient and family education, risks and benefits of treatment options and importance of compliance with treatment  - Medication Side Effects: Adverse side effects of medications were reviewed with the patient/guardian today  Return for follow up visit in  4 months or earlier, if needed  Chief Complaint:  Chief Complaint   Patient presents with    Annual Exam         History of Present Illness:    patient has not been seen for over a year  Her hemoglobin A1c is higher than before at 7 1 but still controlled  The dose of the insulin was not changed   Her blood pressure was high and also she was taken off the lisinopril recently because of dry cough  Valsartan also cause diarrhea which was discontinued as well   She has stage 5 chronic kidney disease but does not want to get dialysis  She continues to follow-up with nephrology   Congestive heart failure is being treated with diuretics      She continues to have a cough which is persistent, will get a chest x-ray done         Active Problem List:  Patient Active Problem List   Diagnosis    Arthropathy    Carpal tunnel syndrome    Mixed hyperlipidemia    Essential hypertension    Tachycardia    Type 2 diabetes mellitus with diabetic polyneuropathy, with long-term current use of insulin (HCC)    Polyneuropathy associated with underlying disease ([facility] )    GERD without esophagitis    Chronic idiopathic constipation    Overweight    Stage 5 chronic kidney disease (HCC)    Chronic systolic congestive heart failure (HCC)    Nephrotic range proteinuria    Anemia in chronic kidney disease    Cough         Past Medical History:  Past Medical History:   Diagnosis Date    Abnormal CBC 6/12/2018    Anxiety     Benign hypertension     Cigarette nicotine dependence without complication 84/28/8817    CTS (carpal tunnel syndrome)     Essential hypertriglyceridemia     Hyperlipidemia     last assessed 12/12/2017    Sebaceous cyst     Type 2 diabetes mellitus ([facility] )     last assessed 12/12/2017         Past Surgical History:  Past Surgical History:   Procedure Laterality Date    CHOLECYSTECTOMY LAPAROSCOPIC      KNEE SURGERY Right          Family History:  Family History   Problem Relation Age of Onset    Other Mother         acute subdural hematoma    Coronary artery disease Mother         CABG    Lung cancer Father     Gout Brother     Lung cancer Brother          Social History:  Social History     Socioeconomic History    Marital status: /Civil Union     Spouse name: None    Number of children: None    Years of education: None    Highest education level: None   Occupational History    None   Tobacco Use    Smoking status: Former Smoker     Packs/day: 0 50     Years: 15 00     Pack years: 7 50     Types: Cigarettes     Quit date: 1/1/2019     Years since quitting: 3 6    Smokeless tobacco: Never Used   Vaping Use    Vaping Use: Never used   Substance and Sexual Activity    Alcohol use: No    Drug use: No    Sexual activity: Yes     Partners: Male   Other Topics Concern    None   Social History Narrative    None     Social Determinants of Health     Financial Resource Strain: Not on file   Food Insecurity: Not on file   Transportation Needs: Not on file   Physical Activity: Not on file   Stress: Not on file   Social Connections: Not on file   Intimate Partner Violence: Not on file   Housing Stability: Not on file         Allergies:   Allergies   Allergen Reactions    Wound Dressing Adhesive Rash         Medications:    Current Outpatient Medications:     aspirin 81 MG tablet, Take 1 tablet by mouth daily, Disp: , Rfl:     Aspirin Buf,CaCarb-MgCarb-MgO, 81 MG TABS, Take 81 mg by mouth, Disp: , Rfl:     atorvastatin (LIPITOR) 20 mg tablet, Take 1 tablet (20 mg total) by mouth daily, Disp: 90 tablet, Rfl: 3    B-D ULTRAFINE III SHORT PEN 31G X 8 MM MISC, use daily as directed for insulin injections , Disp: 100 each, Rfl: 0    Cholecalciferol (Vitamin D3) 1 25 MG (62759 UT) CAPS, Take by mouth, Disp: , Rfl:     fenofibrate (TRICOR) 145 mg tablet, TAKE ONE TABLET BY MOUTH EVERY DAY, Disp: 30 tablet, Rfl: 0    furosemide (LASIX) 40 mg tablet, 2 (two) times a day Take 1 tablets in the morning and 1 tablet at night, Disp: , Rfl:     Lantus SoloStar 100 units/mL injection pen, inject 30 units under the skin once daily as directed (Patient taking differently: Inject 10 Units under the skin daily), Disp: 15 mL, Rfl: 0    metoprolol succinate (TOPROL-XL) 100 mg 24 hr tablet, Take 1 tablet (100 mg total) by mouth daily, Disp: 90 tablet, Rfl: 2    ondansetron (ZOFRAN) 4 mg tablet, Take 1 tablet (4 mg total) by mouth every 8 (eight) hours as needed for nausea or vomiting, Disp: 20 tablet, Rfl: 0    Potassium (POTASSIMIN PO), Take by mouth daily, Disp: , Rfl:     spironolactone (ALDACTONE) 25 mg tablet, Take 25 mg by mouth daily, Disp: , Rfl:     spironolactone (ALDACTONE) 25 mg tablet, Take 25 mg by mouth, Disp: , Rfl:     ergocalciferol (ERGOCALCIFEROL) 1 25 MG (73020 UT) capsule, Take 50,000 Units by mouth, Disp: , Rfl:       The following portions of the patient's history were reviewed and updated as appropriate: past medical history, past surgical history, family history, social history, allergies, current medications and active problem list       Review of Systems:  Constitutional: Denies fever, chills, weight gain, weight loss,Has fatigue  Eyes: Denies eye redness, eye discharge, double vision, change in visual acuity  ENT: Denies hearing loss, tinnitus, sneezing, nasal congestion, nasal discharge, sore throat   Respiratory:   Has a dry cough, denies expectoration, hemoptysis, shortness of breath, wheezing  Cardiovascular: Denies chest pain, palpitations, lower extremity swelling, orthopnea, PND  Gastrointestinal: Denies abdominal pain, heartburn, nausea, vomiting, hematemesis, diarrhea, bloody stools  Genito-Urinary: Denies dysuria, frequency, difficulty in micturition, nocturia, incontinence  Musculoskeletal: Denies back pain, joint pain, muscle pain  Neurologic: Denies confusion, lightheadedness, syncope, headache, focal weakness, sensory changes, seizures  Endocrine: Denies polyuria, polydipsia, temperature intolerance  Allergy and Immunology: Denies hives, insect bite sensitivity  Hematological and Lymphatic: Denies bleeding problems, swollen glands   Psychological: Denies depression, suicidal ideation, anxiety, panic, mood swings  Dermatological: Denies pruritus, rash, skin lesion changes      Vitals:  Vitals:    08/30/22 1513   BP: 160/84   Pulse: 74   Resp: 16   Temp: (!) 97 4 °F (36 3 °C)   SpO2: 99%       Body mass index is 25 95 kg/m²  Weight (last 2 days)     Date/Time Weight    08/30/22 1513 68 6 (151 2)            Physical Examination:  General: Patient is not in acute distress  Awake, alert, responding to commands  No weight gain or loss  Head: Normocephalic  Atraumatic  Eyes: Conjunctiva and lids with no swelling, erythema or discharge  Both pupils normal sized, round and reactive to light  Sclera nonicteric  ENT: External examination of nose and ear normal  Otoscopic examination shows translucent tympanic membranes with patent canals without erythema  Oropharynx moist with no erythema, edema, exudate or lesions  Neck: Supple  JVP not raised  Trachea midline  No masses  No thyromegaly  Lungs: No signs of increased work of breathing or respiratory distress  Bilateral bronchovascular breath sounds with no crackles or rhonchi  Chest wall: No tenderness  Cardiovascular: Normal PMI  No thrills  Regular rate and rhythm  S1 and S2 normal  No murmur, rub or gallop  Gastrointestinal: Abdomen soft, nontender  No guarding or rigidity  Liver and spleen not palpable  Bowel sounds present  Neurologic: Cranial nerves II-XII intact  Cortical functions normal  Motor system - Reflexes 2+ and symmetrical  Sensations normal  Musculoskeletal: Gait normal  No joint tenderness  Integumentary: Skin normal with no rash or lesions  Lymphatic: No palpable lymph nodes in neck, axilla or groin  Extremities: No clubbing, cyanosis, edema or varicosities  Psychological: Judgement and insight normal  Mood and affect normal    Patient's shoes and socks removed  Right Foot/Ankle   Right Foot Inspection  Skin Exam: skin normal and skin intact  No dry skin, no warmth, no callus, no erythema, no maceration, no abnormal color, no pre-ulcer, no ulcer and no callus  Toe Exam: ROM and strength within normal limits  Sensory   Proprioception: diminished and intact  Monofilament testing: intact    Vascular  Capillary refills: < 3 seconds  The right DP pulse is 2+  The right PT pulse is 2+  Left Foot/Ankle  Left Foot Inspection  Skin Exam: skin normal and skin intact  No dry skin, no warmth, no erythema, no maceration, normal color, no pre-ulcer, no ulcer and no callus  Toe Exam: ROM and strength within normal limits  Sensory   Proprioception: intact  Monofilament testing: intact    Vascular  Capillary refills: < 3 seconds  The left DP pulse is 2+  The left PT pulse is 2+       Assign Risk Category  No deformity present  No loss of protective sensation  No weak pulses  Risk: 0    Laboratory Results:  CBC with diff:   Lab Results   Component Value Date    WBC 6 76 08/29/2022 WBC 9 0 04/21/2015    RBC 3 13 (L) 08/29/2022    RBC 3 65 (L) 04/21/2015    HGB 7 2 (L) 08/29/2022    HGB 10 8 (L) 04/21/2015    HCT 25 7 (L) 08/29/2022    HCT 32 2 (L) 04/21/2015    MCV 82 08/29/2022    MCV 88 04/21/2015    MCH 23 0 (L) 08/29/2022    MCH 29 6 04/21/2015    RDW 17 1 (H) 08/29/2022    RDW 10 7 (L) 04/21/2015     08/29/2022     (H) 04/21/2015       CMP:  Lab Results   Component Value Date    CREATININE 3 69 (H) 08/29/2022    CREATININE 1 2 (H) 04/21/2015    BUN 50 (H) 08/29/2022    BUN 30 (H) 04/21/2015     04/21/2015    K 3 3 (L) 08/29/2022    K 4 8 04/21/2015     (H) 08/29/2022     04/21/2015    CO2 22 08/29/2022    CO2 24 9 04/21/2015    GLUCOSE 293 (H) 04/21/2015    PROT 6 8 04/21/2015    ALKPHOS 157 (H) 08/29/2022    ALKPHOS 45 (L) 04/21/2015    ALT 15 08/29/2022    ALT 33 04/21/2015    AST 11 08/29/2022    AST 16 04/21/2015       Lab Results   Component Value Date    HGBA1C 7 1 (H) 08/29/2022    HGBA1C 11 2 (H) 04/21/2015    MG 2 3 08/09/2022    PHOS 4 3 (H) 08/09/2022       No results found for: TROPONINI, CKMB, CKTOTAL    Lipid Profile:   Lab Results   Component Value Date    CHOL 318 04/21/2015     Lab Results   Component Value Date    HDL 31 (L) 08/29/2022    HDL 27 (L) 06/28/2021     Lab Results   Component Value Date    LDLCALC 92 08/29/2022    LDLCALC 85 06/28/2021     Lab Results   Component Value Date    TRIG 115 08/29/2022    TRIG 262 (H) 06/28/2021       Imaging Results:  US kidney and bladder  Narrative: RENAL ULTRASOUND    INDICATION:   N18 4: Chronic kidney disease, stage 4 (severe)  COMPARISON: None    TECHNIQUE:   Ultrasound of the retroperitoneum was performed with a curvilinear transducer utilizing volumetric sweeps and still imaging techniques  FINDINGS:    KIDNEYS:  Symmetric and normal size  Right kidney:  10 2 x 5 1 x 6 3 cm  Left kidney:  11 2 x 5 6 x 5 8 cm  Right kidney  Normal echogenicity and contour     No suspicious masses detected  No hydronephrosis  No shadowing calculi  No perinephric fluid collections  Left kidney  Normal echogenicity and contour  No suspicious masses detected  There is a 1 2 cm exophytic cyst in the lower pole  No hydronephrosis  No shadowing calculi  No perinephric fluid collections  URETERS:  Nonvisualized  BLADDER:   Underdistended  No focal mass  Ureteral jets are not visualized during the course of examination  Impression: Small simple left renal cortical cyst     Otherwise, unremarkable examination       Workstation performed: [id number]       Health Maintenance:  Health Maintenance   Topic Date Due    Pneumococcal Vaccine: Pediatrics (0 to 5 Years) and At-Risk Patients (6 to 59 Years) (1 - PCV) Never done    DM Eye Exam  Never done    HIV Screening  Never done    Annual Physical  Never done    DTaP,Tdap,and Td Vaccines (1 - Tdap) Never done    Cervical Cancer Screening  Never done    Breast Cancer Screening: Mammogram  Never done    Colorectal Cancer Screening  Never done    COVID-19 Vaccine (3 - Booster for Moderna series) 11/10/2021    BMI: Followup Plan  01/20/2022    Diabetic Foot Exam  07/21/2022    Influenza Vaccine (1) 09/01/2022    HEMOGLOBIN A1C  02/28/2023    Depression Screening  08/30/2023    BMI: Adult  08/30/2023    Hepatitis C Screening  Completed    HIB Vaccine  Aged Out    Hepatitis B Vaccine  Aged Out    IPV Vaccine  Aged Out    Hepatitis A Vaccine  Aged Out    Meningococcal ACWY Vaccine  Aged Out    HPV Vaccine  Aged Out     Immunization History   Administered Date(s) Administered    COVID-19 MODERNA VACC 0 5 ML IM 05/13/2021, 06/10/2021    Influenza, recombinant, quadrivalent,injectable, preservative free 10/23/2018         Arina Bess MD  2/36/6903,6:57 PM

## 2022-10-29 PROBLEM — R05.9 COUGH: Status: RESOLVED | Noted: 2022-08-30 | Resolved: 2022-10-29

## 2022-11-04 ENCOUNTER — VBI (OUTPATIENT)
Dept: ADMINISTRATIVE | Facility: OTHER | Age: 62
End: 2022-11-04

## 2022-12-20 ENCOUNTER — VBI (OUTPATIENT)
Dept: ADMINISTRATIVE | Facility: OTHER | Age: 62
End: 2022-12-20

## 2023-01-31 LAB
ALBUMIN SERPL-MCNC: 3.2 G/DL (ref 3.8–4.8)
ALBUMIN/GLOB SERPL: 1.1 {RATIO} (ref 1.2–2.2)
ALP SERPL-CCNC: 181 IU/L (ref 44–121)
ALT SERPL-CCNC: 12 IU/L (ref 0–32)
AST SERPL-CCNC: 15 IU/L (ref 0–40)
BASOPHILS # BLD AUTO: 0.1 X10E3/UL (ref 0–0.2)
BASOPHILS NFR BLD AUTO: 1 %
BILIRUB SERPL-MCNC: 0.3 MG/DL (ref 0–1.2)
BUN SERPL-MCNC: 49 MG/DL (ref 8–27)
BUN/CREAT SERPL: 11 (ref 12–28)
CALCIUM SERPL-MCNC: 8.2 MG/DL (ref 8.7–10.3)
CHLORIDE SERPL-SCNC: 108 MMOL/L (ref 96–106)
CHOLEST SERPL-MCNC: 230 MG/DL (ref 100–199)
CO2 SERPL-SCNC: 16 MMOL/L (ref 20–29)
CREAT SERPL-MCNC: 4.61 MG/DL (ref 0.57–1)
EGFR: 10 ML/MIN/1.73
EOSINOPHIL # BLD AUTO: 0.1 X10E3/UL (ref 0–0.4)
EOSINOPHIL NFR BLD AUTO: 1 %
ERYTHROCYTE [DISTWIDTH] IN BLOOD BY AUTOMATED COUNT: 22.3 % (ref 11.7–15.4)
GLOBULIN SER-MCNC: 3 G/DL (ref 1.5–4.5)
GLUCOSE SERPL-MCNC: 104 MG/DL (ref 70–99)
HBA1C MFR BLD: 7.6 % (ref 4.8–5.6)
HCT VFR BLD AUTO: 28.3 % (ref 34–46.6)
HDLC SERPL-MCNC: 65 MG/DL
HGB BLD-MCNC: 8.3 G/DL (ref 11.1–15.9)
IMM GRANULOCYTES # BLD: 0 X10E3/UL (ref 0–0.1)
IMM GRANULOCYTES NFR BLD: 0 %
LDLC SERPL CALC-MCNC: 141 MG/DL (ref 0–99)
LYMPHOCYTES # BLD AUTO: 0.8 X10E3/UL (ref 0.7–3.1)
LYMPHOCYTES NFR BLD AUTO: 9 %
MCH RBC QN AUTO: 23.8 PG (ref 26.6–33)
MCHC RBC AUTO-ENTMCNC: 29.3 G/DL (ref 31.5–35.7)
MCV RBC AUTO: 81 FL (ref 79–97)
MONOCYTES # BLD AUTO: 0.5 X10E3/UL (ref 0.1–0.9)
MONOCYTES NFR BLD AUTO: 5 %
MORPHOLOGY BLD-IMP: ABNORMAL
NEUTROPHILS # BLD AUTO: 7.3 X10E3/UL (ref 1.4–7)
NEUTROPHILS NFR BLD AUTO: 84 %
PLATELET # BLD AUTO: 307 X10E3/UL (ref 150–450)
POTASSIUM SERPL-SCNC: 2.8 MMOL/L (ref 3.5–5.2)
PROT SERPL-MCNC: 6.2 G/DL (ref 6–8.5)
RBC # BLD AUTO: 3.49 X10E6/UL (ref 3.77–5.28)
SL AMB VLDL CHOLESTEROL CALC: 24 MG/DL (ref 5–40)
SODIUM SERPL-SCNC: 143 MMOL/L (ref 134–144)
TRIGL SERPL-MCNC: 137 MG/DL (ref 0–149)
TSH SERPL DL<=0.005 MIU/L-ACNC: 2.08 UIU/ML (ref 0.45–4.5)
WBC # BLD AUTO: 8.7 X10E3/UL (ref 3.4–10.8)

## 2023-02-02 ENCOUNTER — OFFICE VISIT (OUTPATIENT)
Dept: INTERNAL MEDICINE CLINIC | Facility: CLINIC | Age: 63
End: 2023-02-02

## 2023-02-02 VITALS
TEMPERATURE: 97.7 F | HEIGHT: 64 IN | DIASTOLIC BLOOD PRESSURE: 82 MMHG | OXYGEN SATURATION: 98 % | SYSTOLIC BLOOD PRESSURE: 130 MMHG | BODY MASS INDEX: 21.94 KG/M2 | HEART RATE: 83 BPM | WEIGHT: 128.5 LBS | RESPIRATION RATE: 16 BRPM

## 2023-02-02 DIAGNOSIS — Z12.31 ENCOUNTER FOR SCREENING MAMMOGRAM FOR BREAST CANCER: ICD-10-CM

## 2023-02-02 DIAGNOSIS — E11.42 TYPE 2 DIABETES MELLITUS WITH DIABETIC POLYNEUROPATHY, WITH LONG-TERM CURRENT USE OF INSULIN (HCC): Primary | ICD-10-CM

## 2023-02-02 DIAGNOSIS — Z79.4 TYPE 2 DIABETES MELLITUS WITH DIABETIC POLYNEUROPATHY, WITH LONG-TERM CURRENT USE OF INSULIN (HCC): Primary | ICD-10-CM

## 2023-02-02 DIAGNOSIS — R11.0 NAUSEA: ICD-10-CM

## 2023-02-02 DIAGNOSIS — Z11.4 SCREENING FOR HIV (HUMAN IMMUNODEFICIENCY VIRUS): ICD-10-CM

## 2023-02-02 DIAGNOSIS — I10 ESSENTIAL HYPERTENSION: ICD-10-CM

## 2023-02-02 DIAGNOSIS — I50.22 CHRONIC SYSTOLIC CONGESTIVE HEART FAILURE (HCC): ICD-10-CM

## 2023-02-02 DIAGNOSIS — E78.2 MIXED HYPERLIPIDEMIA: ICD-10-CM

## 2023-02-02 DIAGNOSIS — K21.9 GERD WITHOUT ESOPHAGITIS: ICD-10-CM

## 2023-02-02 DIAGNOSIS — N18.5 STAGE 5 CHRONIC KIDNEY DISEASE (HCC): ICD-10-CM

## 2023-02-02 DIAGNOSIS — R05.3 CHRONIC COUGH: ICD-10-CM

## 2023-02-02 PROBLEM — E66.3 OVERWEIGHT: Status: RESOLVED | Noted: 2019-03-05 | Resolved: 2023-02-02

## 2023-02-02 RX ORDER — ATORVASTATIN CALCIUM 20 MG/1
20 TABLET, FILM COATED ORAL DAILY
Qty: 90 TABLET | Refills: 3 | Status: SHIPPED | OUTPATIENT
Start: 2023-02-02

## 2023-02-02 RX ORDER — AMLODIPINE BESYLATE 2.5 MG/1
2.5 TABLET ORAL
COMMUNITY
Start: 2022-11-07 | End: 2023-11-07

## 2023-02-02 RX ORDER — METOPROLOL SUCCINATE 100 MG/1
100 TABLET, EXTENDED RELEASE ORAL DAILY
Qty: 90 TABLET | Refills: 2 | Status: SHIPPED | OUTPATIENT
Start: 2023-02-02

## 2023-02-02 RX ORDER — POTASSIUM CHLORIDE 20 MEQ/1
20 TABLET, EXTENDED RELEASE ORAL
COMMUNITY
Start: 2023-02-01 | End: 2023-02-04

## 2023-02-02 RX ORDER — SODIUM BICARBONATE 650 MG/1
650 TABLET ORAL
COMMUNITY
Start: 2022-11-07 | End: 2023-02-05

## 2023-02-02 RX ORDER — INSULIN GLARGINE 100 [IU]/ML
10 INJECTION, SOLUTION SUBCUTANEOUS DAILY
Qty: 15 ML | Refills: 3 | Status: SHIPPED | OUTPATIENT
Start: 2023-02-02

## 2023-02-02 RX ORDER — FENOFIBRATE 145 MG/1
145 TABLET, COATED ORAL DAILY
Qty: 90 TABLET | Refills: 2 | Status: SHIPPED | OUTPATIENT
Start: 2023-02-02

## 2023-02-02 RX ORDER — ONDANSETRON 4 MG/1
4 TABLET, FILM COATED ORAL EVERY 8 HOURS PRN
Qty: 20 TABLET | Refills: 0 | Status: SHIPPED | OUTPATIENT
Start: 2023-02-02

## 2023-02-02 NOTE — PROGRESS NOTES
INTERNAL MEDICINE OFFICE VISIT  [facility] AT [facility] [place]  [facility], [facility], [address] [facility]  Tel: [phone]      NAME: Missy Escalante  AGE: 61 y o  SEX: female  : 1960   MRN: [id number]    DATE: 2023  TIME: 1:47 PM      Assessment and Plan:  1  Type 2 diabetes mellitus with diabetic polyneuropathy, with long-term current use of insulin (HCC)  Continue the same dose of insulin  She was told to take it regularly as she has not been compliant and her A1c is higher this time at 7 6  - Insulin Glargine Solostar (Lantus SoloStar) 100 UNIT/ML SOPN; Inject 0 1 mL (10 Units total) under the skin daily  Dispense: 15 mL; Refill: 3  - Comprehensive metabolic panel; Future  - Lipid panel; Future  - TSH, 3rd generation; Future  - Hemoglobin A1C; Future  - Ambulatory Referral to Ophthalmology; Future    2  Essential hypertension  Continue metoprolol and amlodipine  - metoprolol succinate (TOPROL-XL) 100 mg 24 hr tablet; Take 1 tablet (100 mg total) by mouth daily  Dispense: 90 tablet; Refill: 2    3  Mixed hyperlipidemia  Continue medications  - fenofibrate (TRICOR) 145 mg tablet; Take 1 tablet (145 mg total) by mouth daily  Dispense: 90 tablet; Refill: 2  - atorvastatin (LIPITOR) 20 mg tablet; Take 1 tablet (20 mg total) by mouth daily  Dispense: 90 tablet; Refill: 3    4  Stage 5 chronic kidney disease ([facility] [address] )  Follow-up with nephrology  She does not want to go for dialysis and understands the risks    5  Nausea    - ondansetron (ZOFRAN) 4 mg tablet; Take 1 tablet (4 mg total) by mouth every 8 (eight) hours as needed for nausea or vomiting  Dispense: 20 tablet; Refill: 0    6  Chronic systolic congestive heart failure (HCC)  Continue furosemide    7  GERD without esophagitis  Stable without medication    8  Screening for HIV (human immunodeficiency virus)    - HIV 1/2 AG/AB w Reflex [facility] for 2 yr old and above; Future    9   Encounter for screening mammogram for breast cancer    - Mammo screening bilateral w 3d & cad; Future      - Counseling Documentation: patient was counseled regarding: diagnostic results, instructions for management, risk factor reductions, prognosis, patient and family education, risks and benefits of treatment options and importance of compliance with treatment  - Medication Side Effects: Adverse side effects of medications were reviewed with the patient/guardian today  Return for follow up visit in 4 months or earlier, if needed  Chief Complaint:  No chief complaint on file          History of Present Illness:   Type 2 diabetes is worse this time with a hemoglobin A1c of 7 6 as she has not been taking her insulin regularly  Blood pressure and cholesterol are stable  Her creatinine has been increasing and the GFR is down to 10 but she does not want to go for dialysis  Congestive heart failure is stable  She continues to have chronic cough and was told to get a chest x-ray done          Active Problem List:  Patient Active Problem List   Diagnosis   • Arthropathy   • Carpal tunnel syndrome   • Mixed hyperlipidemia   • Essential hypertension   • Tachycardia   • Type 2 diabetes mellitus with diabetic polyneuropathy, with long-term current use of insulin ([facility])   • Polyneuropathy associated with underlying disease ([facility] [address] )   • GERD without esophagitis   • Chronic idiopathic constipation   • Stage 5 chronic kidney disease ([address] )   • Chronic systolic congestive heart failure ([address] )   • Nephrotic range proteinuria   • Anemia in chronic kidney disease         Past Medical History:  Past Medical History:   Diagnosis Date   • Abnormal CBC 6/12/2018   • Anxiety    • Benign hypertension    • Cigarette nicotine dependence without complication 17/74/4795   • CTS (carpal tunnel syndrome)    • Essential hypertriglyceridemia    • Hyperlipidemia     last assessed 12/12/2017   • Overweight 3/5/2019   • Sebaceous cyst    • Type 2 diabetes mellitus ([facility] [address] )     last assessed 2017         Past Surgical History:  Past Surgical History:   Procedure Laterality Date   • CHOLECYSTECTOMY LAPAROSCOPIC     • KNEE SURGERY Right          Family History:  Family History   Problem Relation Age of Onset   • Other Mother         acute subdural hematoma   • Coronary artery disease Mother         CABG   • Lung cancer Father    • Gout Brother    • Lung cancer Brother          Social History:  Social History     Socioeconomic History   • Marital status: /Civil Union     Spouse name: Not on file   • Number of children: Not on file   • Years of education: Not on file   • Highest education level: Not on file   Occupational History   • Not on file   Tobacco Use   • Smoking status: Former     Packs/day: 0 50     Years: 15 00     Pack years: 7 50     Types: Cigarettes     Quit date: 2019     Years since quittin 0   • Smokeless tobacco: Never   Vaping Use   • Vaping Use: Never used   Substance and Sexual Activity   • Alcohol use: No   • Drug use: No   • Sexual activity: Yes     Partners: Male   Other Topics Concern   • Not on file   Social History Narrative   • Not on file     Social Determinants of Health     Financial Resource Strain: Not on file   Food Insecurity: Not on file   Transportation Needs: Not on file   Physical Activity: Not on file   Stress: Not on file   Social Connections: Not on file   Intimate Partner Violence: Not on file   Housing Stability: Not on file         Allergies:   Allergies   Allergen Reactions   • Wound Dressing Adhesive Rash         Medications:    Current Outpatient Medications:   •  amLODIPine (NORVASC) 2 5 mg tablet, Take 2 5 mg by mouth, Disp: , Rfl:   •  atorvastatin (LIPITOR) 20 mg tablet, Take 1 tablet (20 mg total) by mouth daily, Disp: 90 tablet, Rfl: 3  •  B-D ULTRAFINE III SHORT PEN 31G X 8 MM MISC, use daily as directed for insulin injections , Disp: 100 each, Rfl: 0  •  Cholecalciferol (Vitamin D3) 1 25 MG (51064 UT) CAPS, Take by mouth, Disp: , Rfl:   •  fenofibrate (TRICOR) 145 mg tablet, Take 1 tablet (145 mg total) by mouth daily, Disp: 90 tablet, Rfl: 2  •  furosemide (LASIX) 40 mg tablet, 2 (two) times a day Take 1 tablets in the morning and 1 tablet at night, Disp: , Rfl:   •  Insulin Glargine Solostar (Lantus SoloStar) 100 UNIT/ML SOPN, Inject 0 1 mL (10 Units total) under the skin daily, Disp: 15 mL, Rfl: 3  •  metoprolol succinate (TOPROL-XL) 100 mg 24 hr tablet, Take 1 tablet (100 mg total) by mouth daily, Disp: 90 tablet, Rfl: 2  •  ondansetron (ZOFRAN) 4 mg tablet, Take 1 tablet (4 mg total) by mouth every 8 (eight) hours as needed for nausea or vomiting, Disp: 20 tablet, Rfl: 0  •  Potassium (POTASSIMIN PO), Take by mouth daily, Disp: , Rfl:   •  potassium chloride (K-DUR,KLOR-CON) 20 mEq tablet, Take 20 mEq by mouth, Disp: , Rfl:   •  sodium bicarbonate 650 mg tablet, Take 650 mg by mouth, Disp: , Rfl:   •  spironolactone (ALDACTONE) 25 mg tablet, Take 25 mg by mouth daily, Disp: , Rfl:   •  ergocalciferol (ERGOCALCIFEROL) 1 25 MG (51074 UT) capsule, Take 50,000 Units by mouth, Disp: , Rfl:   •  spironolactone (ALDACTONE) 25 mg tablet, Take 25 mg by mouth, Disp: , Rfl:       The following portions of the patient's history were reviewed and updated as appropriate: past medical history, past surgical history, family history, social history, allergies, current medications and active problem list       Review of Systems:  Constitutional: Denies fever, chills, weight gain, weight loss, complains of fatigue  Eyes: Denies eye redness, eye discharge, double vision, change in visual acuity  ENT: Denies hearing loss, tinnitus, sneezing, nasal congestion, nasal discharge, sore throat   Respiratory: Has chronic cough, denies expectoration, hemoptysis, shortness of breath, wheezing  Cardiovascular: Denies chest pain, palpitations, lower extremity swelling, orthopnea, PND  Gastrointestinal: Denies abdominal pain, heartburn, nausea, vomiting, hematemesis, diarrhea, bloody stools  Genito-Urinary: Denies dysuria, frequency, difficulty in micturition, nocturia, incontinence  Musculoskeletal: Denies back pain, joint pain, muscle pain  Neurologic: Denies confusion, lightheadedness, syncope, headache, focal weakness, sensory changes, seizures  Endocrine: Denies polyuria, polydipsia, temperature intolerance  Allergy and Immunology: Denies hives, insect bite sensitivity  Hematological and Lymphatic: Denies bleeding problems, swollen glands   Psychological: Denies depression, suicidal ideation, anxiety, panic, mood swings  Dermatological: Denies pruritus, rash, skin lesion changes      Vitals:  Vitals:    02/02/23 1313   BP: 130/82   Pulse: 83   Resp: 16   Temp: 97 7 °F (36 5 °C)   SpO2: 98%       Body mass index is 22 06 kg/m²  Weight (last 2 days)     Date/Time Weight    02/02/23 1313 58 3 (128 5)            Physical Examination:  General: Patient is not in acute distress  Awake, alert, responding to commands  No weight gain or loss  Head: Normocephalic  Atraumatic  Eyes: Conjunctiva and lids with no swelling, erythema or discharge  Both pupils normal sized, round and reactive to light  Sclera nonicteric  ENT: External examination of nose and ear normal  Otoscopic examination shows translucent tympanic membranes with patent canals without erythema  Oropharynx moist with no erythema, edema, exudate or lesions  Neck: Supple  JVP not raised  Trachea midline  No masses  No thyromegaly  Lungs: No signs of increased work of breathing or respiratory distress  Bilateral basilar crackles   Chest wall: No tenderness  Cardiovascular: Normal PMI  No thrills  Regular rate and rhythm  S1 and S2 normal  No murmur, rub or gallop  Gastrointestinal: Abdomen soft, nontender  No guarding or rigidity  Liver and spleen not palpable  Bowel sounds present  Neurologic: Cranial nerves II-XII intact   Cortical functions normal  Motor system - Reflexes 2+ and symmetrical  Sensations normal  Musculoskeletal: Gait normal  No joint tenderness  Integumentary: Skin normal with no rash or lesions  Lymphatic: No palpable lymph nodes in neck, axilla or groin  Extremities: No clubbing, cyanosis, edema or varicosities  Psychological: Judgement and insight normal  Mood and affect normal    Patient's shoes and socks removed  Right Foot/Ankle   Right Foot Inspection  Skin Exam: skin normal and skin intact  No dry skin, no warmth, no callus, no erythema, no maceration, no abnormal color, no pre-ulcer, no ulcer and no callus  Toe Exam: ROM and strength within normal limits  Sensory   Proprioception: diminished and intact  Monofilament testing: intact    Vascular  Capillary refills: < 3 seconds  The right DP pulse is 2+  The right PT pulse is 2+  Left Foot/Ankle  Left Foot Inspection  Skin Exam: skin normal and skin intact  No dry skin, no warmth, no erythema, no maceration, normal color, no pre-ulcer, no ulcer and no callus  Toe Exam: ROM and strength within normal limits  Sensory   Proprioception: intact  Monofilament testing: intact    Vascular  Capillary refills: < 3 seconds  The left DP pulse is 2+  The left PT pulse is 2+       Assign Risk Category  No deformity present  No loss of protective sensation  No weak pulses  Risk: 0    Laboratory Results:  CBC with diff:   Lab Results   Component Value Date    WBC 8 7 01/30/2023    WBC 6 76 08/29/2022    WBC 9 0 04/21/2015    RBC 3 49 (L) 01/30/2023    RBC 3 13 (L) 08/29/2022    RBC 3 65 (L) 04/21/2015    HGB 8 3 (L) 01/30/2023    HGB 7 2 (L) 08/29/2022    HGB 10 8 (L) 04/21/2015    HCT 28 3 (L) 01/30/2023    HCT 25 7 (L) 08/29/2022    HCT 32 2 (L) 04/21/2015    MCV 81 01/30/2023    MCV 82 08/29/2022    MCV 88 04/21/2015    MCH 23 8 (L) 01/30/2023    MCH 23 0 (L) 08/29/2022    MCH 29 6 04/21/2015    RDW 22 3 (H) 01/30/2023    RDW 17 1 (H) 08/29/2022    RDW 10 7 (L) 04/21/2015     01/30/2023     08/29/2022     (H) 04/21/2015       CMP:  Lab Results   Component Value Date    CREATININE 4 61 (H) 01/30/2023    CREATININE 3 69 (H) 08/29/2022    CREATININE 1 2 (H) 04/21/2015    BUN 49 (H) 01/30/2023     04/21/2015    K 2 8 (L) 01/30/2023     (H) 01/30/2023    CO2 16 (L) 01/30/2023    GLUCOSE 293 (H) 04/21/2015    PROT 6 8 04/21/2015    ALKPHOS 157 (H) 08/29/2022    ALKPHOS 45 (L) 04/21/2015    ALT 12 01/30/2023    AST 15 01/30/2023       Lab Results   Component Value Date    HGBA1C 7 6 (H) 01/30/2023    MG 2 3 08/09/2022    PHOS 4 3 (H) 08/09/2022       No results found for: TROPONINI, CKMB, CKTOTAL    Lipid Profile:   Lab Results   Component Value Date    CHOL 318 04/21/2015     Lab Results   Component Value Date    HDL 65 01/30/2023    HDL 31 (L) 08/29/2022     Lab Results   Component Value Date    LDLCALC 141 (H) 01/30/2023    LDLCALC 92 08/29/2022     Lab Results   Component Value Date    TRIG 137 01/30/2023    TRIG 115 08/29/2022       Imaging Results:  US kidney and bladder  Narrative: RENAL ULTRASOUND    INDICATION:   N18 4: Chronic kidney disease, stage 4 (severe)  COMPARISON: None    TECHNIQUE:   Ultrasound of the retroperitoneum was performed with a curvilinear transducer utilizing volumetric sweeps and still imaging techniques  FINDINGS:    KIDNEYS:  Symmetric and normal size  Right kidney:  10 2 x 5 1 x 6 3 cm  Left kidney:  11 2 x 5 6 x 5 8 cm  Right kidney  Normal echogenicity and contour  No suspicious masses detected  No hydronephrosis  No shadowing calculi  No perinephric fluid collections  Left kidney  Normal echogenicity and contour  No suspicious masses detected  There is a 1 2 cm exophytic cyst in the lower pole  No hydronephrosis  No shadowing calculi  No perinephric fluid collections  URETERS:  Nonvisualized  BLADDER:   Underdistended  No focal mass  Ureteral jets are not visualized during the course of examination    Impression: Small simple left renal cortical cyst     Otherwise, unremarkable examination       Workstation performed: [id number]       Health Maintenance:  Health Maintenance   Topic Date Due   • Pneumococcal Vaccine: Pediatrics (0 to 5 Years) and At-Risk Patients (6 to 59 Years) (1 - PCV) Never done   • DM Eye Exam  Never done   • HIV Screening  Never done   • Annual Physical  Never done   • DTaP,Tdap,and Td Vaccines (1 - Tdap) Never done   • Cervical Cancer Screening  Never done   • Breast Cancer Screening: Mammogram  Never done   • Colorectal Cancer Screening  Never done   • COVID-19 Vaccine (3 - Booster for Moderna series) 08/05/2021   • Influenza Vaccine (1) 09/01/2022   • HEMOGLOBIN A1C  07/30/2023   • Depression Screening  08/30/2023   • Diabetic Foot Exam  08/30/2023   • BMI: Adult  02/02/2024   • Hepatitis C Screening  Completed   • HIB Vaccine  Aged Out   • IPV Vaccine  Aged Out   • Hepatitis A Vaccine  Aged Out   • Meningococcal ACWY Vaccine  Aged Out   • HPV Vaccine  Aged Out     Immunization History   Administered Date(s) Administered   • COVID-19 MODERNA VACC 0 5 ML IM 05/13/2021, 06/10/2021   • Influenza, recombinant, quadrivalent,injectable, preservative free 10/23/2018         Teri Hidalgo MD  2/2/2023,1:47 PM

## 2023-07-11 ENCOUNTER — VBI (OUTPATIENT)
Dept: ADMINISTRATIVE | Facility: OTHER | Age: 63
End: 2023-07-11

## 2023-08-02 ENCOUNTER — VBI (OUTPATIENT)
Dept: ADMINISTRATIVE | Facility: OTHER | Age: 63
End: 2023-08-02

## 2023-08-14 ENCOUNTER — VBI (OUTPATIENT)
Dept: ADMINISTRATIVE | Facility: OTHER | Age: 63
End: 2023-08-14

## 2023-08-16 ENCOUNTER — TELEPHONE (OUTPATIENT)
Age: 63
End: 2023-08-16

## 2023-12-28 NOTE — TELEPHONE ENCOUNTER
Upon review of the In Basket request we are not able to locate the documentation requested  There are no colonoscopy notes in [facility]  Any additional questions or concerns should be emailed to the Practice Liaisons via [email or web address] email, please do not reply via In Basket      Thank you  Sandrine Spence same name as above

## 2024-02-20 DIAGNOSIS — Z79.4 TYPE 2 DIABETES MELLITUS WITH DIABETIC POLYNEUROPATHY, WITH LONG-TERM CURRENT USE OF INSULIN (HCC): ICD-10-CM

## 2024-02-20 DIAGNOSIS — E11.42 TYPE 2 DIABETES MELLITUS WITH DIABETIC POLYNEUROPATHY, WITH LONG-TERM CURRENT USE OF INSULIN (HCC): ICD-10-CM

## 2024-02-20 RX ORDER — INSULIN GLARGINE 100 [IU]/ML
10 INJECTION, SOLUTION SUBCUTANEOUS DAILY
Qty: 15 ML | Refills: 0 | Status: SHIPPED | OUTPATIENT
Start: 2024-02-20

## 2024-04-29 ENCOUNTER — VBI (OUTPATIENT)
Dept: ADMINISTRATIVE | Facility: OTHER | Age: 64
End: 2024-04-29

## 2024-05-20 ENCOUNTER — OFFICE VISIT (OUTPATIENT)
Age: 64
End: 2024-05-20
Payer: COMMERCIAL

## 2024-05-20 VITALS
WEIGHT: 125 LBS | TEMPERATURE: 98.1 F | SYSTOLIC BLOOD PRESSURE: 104 MMHG | DIASTOLIC BLOOD PRESSURE: 66 MMHG | HEIGHT: 64 IN | OXYGEN SATURATION: 95 % | HEART RATE: 72 BPM | BODY MASS INDEX: 21.34 KG/M2 | RESPIRATION RATE: 18 BRPM

## 2024-05-20 DIAGNOSIS — N18.5 STAGE 5 CHRONIC KIDNEY DISEASE (HCC): ICD-10-CM

## 2024-05-20 DIAGNOSIS — I50.22 CHRONIC SYSTOLIC CONGESTIVE HEART FAILURE (HCC): ICD-10-CM

## 2024-05-20 DIAGNOSIS — I10 ESSENTIAL HYPERTENSION: ICD-10-CM

## 2024-05-20 DIAGNOSIS — E11.42 TYPE 2 DIABETES MELLITUS WITH DIABETIC POLYNEUROPATHY, WITH LONG-TERM CURRENT USE OF INSULIN (HCC): Primary | ICD-10-CM

## 2024-05-20 DIAGNOSIS — E78.2 MIXED HYPERLIPIDEMIA: ICD-10-CM

## 2024-05-20 DIAGNOSIS — Z79.4 TYPE 2 DIABETES MELLITUS WITH DIABETIC POLYNEUROPATHY, WITH LONG-TERM CURRENT USE OF INSULIN (HCC): Primary | ICD-10-CM

## 2024-05-20 DIAGNOSIS — Z12.11 SCREEN FOR COLON CANCER: ICD-10-CM

## 2024-05-20 LAB — SL AMB POCT HEMOGLOBIN AIC: 5.7 (ref ?–6.5)

## 2024-05-20 PROCEDURE — 83036 HEMOGLOBIN GLYCOSYLATED A1C: CPT | Performed by: INTERNAL MEDICINE

## 2024-05-20 PROCEDURE — 99214 OFFICE O/P EST MOD 30 MIN: CPT | Performed by: INTERNAL MEDICINE

## 2024-05-20 RX ORDER — ACETAMINOPHEN 325 MG/1
975 TABLET ORAL
COMMUNITY
Start: 2024-01-04

## 2024-05-20 RX ORDER — SEVELAMER CARBONATE 800 MG/1
TABLET, FILM COATED ORAL
COMMUNITY
Start: 2024-04-22

## 2024-05-20 RX ORDER — BLOOD-GLUCOSE METER
KIT MISCELLANEOUS
Qty: 1 KIT | Refills: 0 | Status: SHIPPED | OUTPATIENT
Start: 2024-05-20 | End: 2024-05-21 | Stop reason: SDUPTHER

## 2024-05-20 RX ORDER — HYDRALAZINE HYDROCHLORIDE 50 MG/1
50 TABLET, FILM COATED ORAL 3 TIMES DAILY
COMMUNITY
Start: 2024-04-10

## 2024-05-20 RX ORDER — CALCITRIOL 0.5 UG/1
1 CAPSULE, LIQUID FILLED ORAL DAILY
COMMUNITY
Start: 2024-03-14

## 2024-05-20 RX ORDER — OMEPRAZOLE 20 MG/1
20 CAPSULE, DELAYED RELEASE ORAL DAILY
COMMUNITY
Start: 2024-04-07

## 2024-05-20 RX ORDER — BLOOD SUGAR DIAGNOSTIC
STRIP MISCELLANEOUS
Qty: 100 EACH | Refills: 3 | Status: SHIPPED | OUTPATIENT
Start: 2024-05-20 | End: 2024-05-21 | Stop reason: SDUPTHER

## 2024-05-20 RX ORDER — LOSARTAN POTASSIUM 100 MG/1
100 TABLET ORAL
COMMUNITY
Start: 2024-03-08

## 2024-05-20 RX ORDER — EZETIMIBE 10 MG/1
10 TABLET ORAL DAILY
COMMUNITY
Start: 2024-04-29

## 2024-05-20 RX ORDER — LANCETS 33 GAUGE
EACH MISCELLANEOUS
Qty: 100 EACH | Refills: 3 | Status: SHIPPED | OUTPATIENT
Start: 2024-05-20 | End: 2024-05-21 | Stop reason: SDUPTHER

## 2024-05-20 NOTE — PROGRESS NOTES
INTERNAL MEDICINE OFFICE VISIT  [facility] [place] [address]  Tel: [phone]      NAME: Phuong Causey  AGE: 64 y.o.  SEX: female  : 1960   MRN: [id number]    DATE: 2024  TIME: 11:38 AM      Assessment and Plan:  1. Type 2 diabetes mellitus with diabetic polyneuropathy, with long-term current use of insulin (HCC)  Type 2 diabetes is very well-controlled.  Continue the same medications and insulin dosage.  Patient has an appointment with the ophthalmologist soon  - POCT hemoglobin A1c  - Albumin / creatinine urine ratio; Future  - Comprehensive metabolic panel; Future  - Hemoglobin A1C; Future  - CBC and differential; Future  - Lipid Panel with Direct LDL reflex; Future  - TSH, 3rd generation with Free T4 reflex; Future    2. Essential hypertension  Blood pressure is stable, continue medications regularly    3. Mixed hyperlipidemia  Continue atorvastatin and Zetia.  Not taking the fenofibrate anymore    4. Stage 5 chronic kidney disease (HCC)  Has been doing peritoneal dialysis at home.  Follow-up with nephrology    5. Chronic systolic congestive heart failure (HCC)  Recently had CABG done at [facility] in [place].  Is following up with cardiology there.  Doing well now    6. Screen for colon cancer    - Ambulatory Referral to Gastroenterology; Future      - Counseling Documentation: patient was counseled regarding: diagnostic results, instructions for management, risk factor reductions, prognosis, patient and family education, risks and benefits of treatment options, and importance of compliance with treatment  - Medication Side Effects: Adverse side effects of medications were reviewed with the patient/guardian today.      Return for follow up visit in 4 months or earlier, if needed.      Chief Complaint:  Chief Complaint   Patient presents with    Annual Exam         History of Present Illness:   Patient has not been  seen for a while.  Since my last visit with her, she had a cardiac catheterization done at and due to the blockages found, she had a CABG done in [facility].  Now she is doing well but is on peritoneal dialysis.  Take all her medications regularly.      Active Problem List:  Patient Active Problem List   Diagnosis    Arthropathy    Carpal tunnel syndrome    Mixed hyperlipidemia    Essential hypertension    Tachycardia    Type 2 diabetes mellitus with diabetic polyneuropathy, with long-term current use of insulin (HCC)    Polyneuropathy associated with underlying disease (HCC)    GERD without esophagitis    Chronic idiopathic constipation    Stage 5 chronic kidney disease (HCC)    Chronic systolic congestive heart failure (HCC)    Nephrotic range proteinuria    Anemia in chronic kidney disease    Chronic cough         Past Medical History:  Past Medical History:   Diagnosis Date    Abnormal CBC 6/12/2018    Anxiety     Benign hypertension     Cigarette nicotine dependence without complication 12/12/2017    CTS (carpal tunnel syndrome)     Essential hypertriglyceridemia     Hyperlipidemia     last assessed 12/12/2017    Overweight 3/5/2019    Sebaceous cyst     Type 2 diabetes mellitus (HCC)     last assessed 12/12/2017         Past Surgical History:  Past Surgical History:   Procedure Laterality Date    CHOLECYSTECTOMY LAPAROSCOPIC      KNEE SURGERY Right          Family History:  Family History   Problem Relation Age of Onset    Other Mother         acute subdural hematoma    Coronary artery disease Mother         CABG    Lung cancer Father     Gout Brother     Lung cancer Brother          Social History:  Social History     Socioeconomic History    Marital status: /Civil Union     Spouse name: None    Number of children: None    Years of education: None    Highest education level: None   Occupational History    None   Tobacco Use    Smoking status: Former     Current packs/day: 0.00     Average  packs/day: 0.5 packs/day for 15.0 years (7.5 ttl pk-yrs)     Types: Cigarettes     Start date: 2004     Quit date: 2019     Years since quittin.3    Smokeless tobacco: Never   Vaping Use    Vaping status: Never Used   Substance and Sexual Activity    Alcohol use: No    Drug use: No    Sexual activity: Yes     Partners: Male   Other Topics Concern    None   Social History Narrative    None     Social Determinants of Health     Financial Resource Strain: Not on file   Food Insecurity: Not on file   Transportation Needs: Not on file   Physical Activity: Sufficiently Active (2021)    Exercise Vital Sign     Days of Exercise per Week: 4 days     Minutes of Exercise per Session: 40 min   Stress: No Stress Concern Present (2021)    [employer] - Occupational Stress Questionnaire     Feeling of Stress : Not at all   Social Connections: Not on file   Intimate Partner Violence: Not on file   Housing Stability: Not on file         Allergies:  Allergies   Allergen Reactions    Wound Dressing Adhesive Rash         Medications:    Current Outpatient Medications:     acetaminophen (TYLENOL) 325 mg tablet, Take 975 mg by mouth, Disp: , Rfl:     amLODIPine (NORVASC) 2.5 mg tablet, Take 5 mg by mouth, Disp: , Rfl:     atorvastatin (LIPITOR) 20 mg tablet, Take 1 tablet (20 mg total) by mouth daily, Disp: 90 tablet, Rfl: 3    calcitriol (ROCALTROL) 0.5 MCG capsule, Take 1 mcg by mouth daily, Disp: , Rfl:     ezetimibe (ZETIA) 10 mg tablet, Take 10 mg by mouth daily, Disp: , Rfl:     furosemide (LASIX) 40 mg tablet, 2 (two) times a day Take 1 tablets in the morning and 1 tablet at night, Disp: , Rfl:     hydrALAZINE (APRESOLINE) 50 mg tablet, Take 50 mg by mouth 3 (three) times a day, Disp: , Rfl:     Insulin Glargine Solostar (Lantus SoloStar) 100 UNIT/ML SOPN, Inject 0.1 mL (10 Units total) under the skin daily, Disp: 15 mL, Rfl: 0    losartan (COZAAR) 100 MG tablet, Take 100 mg by  mouth daily at bedtime, Disp: , Rfl:     metoprolol succinate (TOPROL-XL) 100 mg 24 hr tablet, Take 1 tablet (100 mg total) by mouth daily (Patient taking differently: Take 50 mg by mouth daily), Disp: 90 tablet, Rfl: 2    omeprazole (PriLOSEC) 20 mg delayed release capsule, Take 20 mg by mouth daily, Disp: , Rfl:     ondansetron (ZOFRAN) 4 mg tablet, take 1 tablet by mouth every 8 hours if needed for nausea and vomiting, Disp: 20 tablet, Rfl: 0    sevelamer carbonate (RENVELA) 800 mg tablet, take 2 tablets by mouth three times a day with meals, Disp: , Rfl:     Cholecalciferol (Vitamin D3) 1.25 MG (89780 UT) CAPS, Take by mouth (Patient not taking: Reported on 5/20/2024), Disp: , Rfl:     ergocalciferol (ERGOCALCIFEROL) 1.25 MG (52252 UT) capsule, Take 50,000 Units by mouth, Disp: , Rfl:     potassium chloride (K-DUR,KLOR-CON) 20 mEq tablet, Take 20 mEq by mouth, Disp: , Rfl:       The following portions of the patient's history were reviewed and updated as appropriate: past medical history, past surgical history, family history, social history, allergies, current medications and active problem list.      Review of Systems:  Constitutional: Denies fever, chills, weight gain, weight loss, fatigue  Eyes: Denies eye redness, eye discharge, double vision, change in visual acuity  ENT: Denies hearing loss, tinnitus, sneezing, nasal congestion, nasal discharge, sore throat   Respiratory: Denies cough, expectoration, hemoptysis, shortness of breath, wheezing  Cardiovascular: Denies chest pain, palpitations, lower extremity swelling, orthopnea, PND  Gastrointestinal: Denies abdominal pain, heartburn, nausea, vomiting, hematemesis, diarrhea, bloody stools  Genito-Urinary: Denies dysuria, frequency, difficulty in micturition, nocturia, incontinence  Musculoskeletal: Denies back pain, joint pain, muscle pain  Neurologic: Denies confusion, lightheadedness, syncope, headache, focal weakness, sensory changes,  seizures  Endocrine: Denies polyuria, polydipsia, temperature intolerance  Allergy and Immunology: Denies hives, insect bite sensitivity  Hematological and Lymphatic: Denies bleeding problems, swollen glands   Psychological: Denies depression, suicidal ideation, anxiety, panic, mood swings  Dermatological: Denies pruritus, rash, skin lesion changes      Vitals:  Vitals:    05/20/24 1121   BP: 104/66   Pulse: 72   Resp: 18   Temp: 98.1 °F (36.7 °C)   SpO2: 95%       Body mass index is 21.46 kg/m².    Weight (last 2 days)       Date/Time Weight    05/20/24 1121 56.7 (125)              Physical Examination:  General: Patient is not in acute distress. Awake, alert, responding to commands. No weight gain or loss  Head: Normocephalic. Atraumatic  Eyes: Conjunctiva and lids with no swelling, erythema or discharge. Both pupils normal sized, round and reactive to light. Sclera nonicteric  ENT: External examination of nose and ear normal. Otoscopic examination shows translucent tympanic membranes with patent canals without erythema. Oropharynx moist with no erythema, edema, exudate or lesions  Neck: Supple. JVP not raised. Trachea midline. No masses. No thyromegaly  Lungs: No signs of increased work of breathing or respiratory distress. Bilateral bronchovascular breath sounds with no crackles or rhonchi  Chest wall: No tenderness  Cardiovascular: Normal PMI. No thrills. Regular rate and rhythm. S1 and S2 normal. No murmur, rub or gallop  Gastrointestinal: Abdomen soft, nontender. No guarding or rigidity. Liver and spleen not palpable. Bowel sounds present  Neurologic: Cranial nerves II-XII intact. Cortical functions normal. Motor system - Reflexes 2+ and symmetrical. Sensations normal  Musculoskeletal: Gait normal. No joint tenderness  Integumentary: Skin normal with no rash or lesions  Lymphatic: No palpable lymph nodes in neck, axilla or groin  Extremities: No clubbing, cyanosis, edema or varicosities  Psychological:  "Judgement and insight normal. Mood and affect normal      Laboratory Results:  CBC with diff:   Lab Results   Component Value Date    WBC 8.7 01/30/2023    WBC 6.76 08/29/2022    WBC 9.0 04/21/2015    RBC 3.49 (L) 01/30/2023    RBC 3.13 (L) 08/29/2022    RBC 3.65 (L) 04/21/2015    HGB 8.3 (L) 01/30/2023    HGB 7.2 (L) 08/29/2022    HGB 10.8 (L) 04/21/2015    HCT 28.3 (L) 01/30/2023    HCT 25.7 (L) 08/29/2022    HCT 32.2 (L) 04/21/2015    MCV 81 01/30/2023    MCV 82 08/29/2022    MCV 88 04/21/2015    MCH 23.8 (L) 01/30/2023    MCH 23.0 (L) 08/29/2022    MCH 29.6 04/21/2015    RDW 22.3 (H) 01/30/2023    RDW 17.1 (H) 08/29/2022    RDW 10.7 (L) 04/21/2015     01/30/2023     08/29/2022     (H) 04/21/2015       CMP:  Lab Results   Component Value Date    CREATININE 5.0 (H) 01/04/2024    BUN 40 (H) 01/04/2024     04/21/2015    K 4.0 01/04/2024    CL 99 01/04/2024    CO2 24 01/04/2024    GLUCOSE 293 (H) 04/21/2015    PROT 6.8 04/21/2015    ALKPHOS 185 (H) 03/28/2023    ALT 14 03/28/2023    AST 12 03/28/2023       Lab Results   Component Value Date    HGBA1C 5.7 05/20/2024    HGBA1C 6.8 (H) 08/21/2023    MG 2.3 08/09/2022    PHOS 1.5 (L) 04/06/2023       No results found for: \"TROPONINI\", \"CKMB\", \"CKTOTAL\"    Lipid Profile:   Lab Results   Component Value Date    CHOL 318 04/21/2015     Lab Results   Component Value Date    HDL 65 01/30/2023    HDL 31 (L) 08/29/2022     Lab Results   Component Value Date    LDLCALC 141 (H) 01/30/2023    LDLCALC 92 08/29/2022     Lab Results   Component Value Date    TRIG 137 01/30/2023    TRIG 115 08/29/2022       Imaging Results:  US kidney and bladder  Narrative: RENAL ULTRASOUND    INDICATION:   N18.4: Chronic kidney disease, stage 4 (severe).    COMPARISON: None    TECHNIQUE:   Ultrasound of the retroperitoneum was performed with a curvilinear transducer utilizing volumetric sweeps and still imaging techniques.     FINDINGS:    KIDNEYS:  Symmetric and normal " size.  Right kidney:  10.2 x 5.1 x 6.3 cm.  Left kidney:  11.2 x 5.6 x 5.8 cm.    Right kidney  Normal echogenicity and contour.   No suspicious masses detected.   No hydronephrosis.  No shadowing calculi.  No perinephric fluid collections.    Left kidney  Normal echogenicity and contour.   No suspicious masses detected.  There is a 1.2 cm exophytic cyst in the lower pole.  No hydronephrosis.  No shadowing calculi.  No perinephric fluid collections.    URETERS:  Nonvisualized.    BLADDER:   Underdistended.  No focal mass.  Ureteral jets are not visualized during the course of examination.  Impression: Small simple left renal cortical cyst.    Otherwise, unremarkable examination.     Workstation performed: [id number]       Health Maintenance:  Health Maintenance   Topic Date Due    Pneumococcal Vaccine: Pediatrics (0 to 5 Years) and At-Risk Patients (6 to 64 Years) (1 of 2 - PCV) Never done    DM Eye Exam  Never done    HIV Screening  Never done    Annual Physical  Never done    DTaP,Tdap,and Td Vaccines (1 - Tdap) Never done    Cervical Cancer Screening  Never done    Breast Cancer Screening: Mammogram  Never done    Colorectal Cancer Screening  Never done    Zoster Vaccine (1 of 2) Never done    RSV Vaccine Age 60+ Years (1 - 1-dose 60+ series) Never done    COVID-19 Vaccine (3 - 2023-24 season) 09/01/2023    Diabetic Foot Exam  02/02/2024    Influenza Vaccine (Season Ended) 09/01/2024    HEMOGLOBIN A1C  11/20/2024    Depression Screening  05/20/2025    Hepatitis C Screening  Completed    RSV Vaccine age 0-20 Months  Aged Out    HIB Vaccine  Aged Out    IPV Vaccine  Aged Out    Hepatitis A Vaccine  Aged Out    Meningococcal ACWY Vaccine  Aged Out    HPV Vaccine  Aged Out     Immunization History   Administered Date(s) Administered    COVID-19 MODERNA VACC 0.5 ML IM 05/13/2021, 06/10/2021    Influenza, recombinant, quadrivalent,injectable, preservative free 10/23/2018         Emily Ortiz MD  5/20/2024,11:38  AM      Diabetic Foot Exam    Patient's shoes and socks removed.    Right Foot/Ankle   Right Foot Inspection  Skin Exam: skin normal and skin intact. No dry skin, no warmth, no callus, no erythema, no maceration, no abnormal color, no pre-ulcer, no ulcer and no callus.     Toe Exam: ROM and strength within normal limits.     Sensory   Monofilament testing: intact    Vascular  The right DP pulse is 2+. The right PT pulse is 2+.     Left Foot/Ankle  Left Foot Inspection  Skin Exam: skin normal and skin intact. No dry skin, no warmth, no erythema, no maceration, normal color, no pre-ulcer, no ulcer and no callus.     Toe Exam: ROM and strength within normal limits.     Sensory   Monofilament testing: intact    Vascular  The left DP pulse is 2+. The left PT pulse is 2+.     Assign Risk Category  No deformity present  No loss of protective sensation  No weak pulses  Risk: 0

## 2024-05-21 ENCOUNTER — TELEPHONE (OUTPATIENT)
Age: 64
End: 2024-05-21

## 2024-05-21 DIAGNOSIS — E11.42 TYPE 2 DIABETES MELLITUS WITH DIABETIC POLYNEUROPATHY, WITH LONG-TERM CURRENT USE OF INSULIN (HCC): ICD-10-CM

## 2024-05-21 DIAGNOSIS — Z79.4 TYPE 2 DIABETES MELLITUS WITH DIABETIC POLYNEUROPATHY, WITH LONG-TERM CURRENT USE OF INSULIN (HCC): ICD-10-CM

## 2024-05-21 RX ORDER — BLOOD-GLUCOSE METER
KIT MISCELLANEOUS
Qty: 1 KIT | Refills: 0 | Status: SHIPPED | OUTPATIENT
Start: 2024-05-21

## 2024-05-21 RX ORDER — BLOOD SUGAR DIAGNOSTIC
STRIP MISCELLANEOUS
Qty: 100 EACH | Refills: 3 | Status: SHIPPED | OUTPATIENT
Start: 2024-05-21

## 2024-05-21 RX ORDER — LANCETS 33 GAUGE
1 EACH MISCELLANEOUS DAILY
Qty: 90 EACH | Refills: 3 | Status: SHIPPED | OUTPATIENT
Start: 2024-05-21

## 2024-05-21 NOTE — TELEPHONE ENCOUNTER
Blood Glucose Monitoring Suppl (OneTouch Verio Reflect) w/Device KIT   glucose blood (OneTouch Verio) test strip   OneTouch Delica Lancets 33G MISC     All script for yesterday need to go to this pharmacy:   [facility] in [place] Phone [phone]   Advancement Flap (Single) Text: The defect edges were debeveled with a #15 scalpel blade.  Given the location of the defect and the proximity to free margins a single advancement flap was deemed most appropriate.  Using a sterile surgical marker, an appropriate advancement flap was drawn incorporating the defect and placing the expected incisions within the relaxed skin tension lines where possible.    The area thus outlined was incised deep to adipose tissue with a #15 scalpel blade.  The skin margins were undermined to an appropriate distance in all directions utilizing iris scissors.

## 2024-06-04 ENCOUNTER — VBI (OUTPATIENT)
Dept: ADMINISTRATIVE | Facility: OTHER | Age: 64
End: 2024-06-04

## 2024-06-06 DIAGNOSIS — E11.42 TYPE 2 DIABETES MELLITUS WITH DIABETIC POLYNEUROPATHY, WITH LONG-TERM CURRENT USE OF INSULIN (HCC): Primary | ICD-10-CM

## 2024-06-06 DIAGNOSIS — Z79.4 TYPE 2 DIABETES MELLITUS WITH DIABETIC POLYNEUROPATHY, WITH LONG-TERM CURRENT USE OF INSULIN (HCC): Primary | ICD-10-CM

## 2024-06-06 RX ORDER — BLOOD SUGAR DIAGNOSTIC
STRIP MISCELLANEOUS DAILY
Qty: 90 EACH | Refills: 1 | Status: SHIPPED | OUTPATIENT
Start: 2024-06-06

## 2024-06-06 NOTE — TELEPHONE ENCOUNTER
Pt needs needles for the pen ordered please send to [facility] #[id number] - [place] - [address]

## 2024-07-17 ENCOUNTER — VBI (OUTPATIENT)
Dept: ADMINISTRATIVE | Facility: OTHER | Age: 64
End: 2024-07-17

## 2024-07-17 DIAGNOSIS — E11.42 TYPE 2 DIABETES MELLITUS WITH DIABETIC POLYNEUROPATHY, WITH LONG-TERM CURRENT USE OF INSULIN (HCC): ICD-10-CM

## 2024-07-17 DIAGNOSIS — Z79.4 TYPE 2 DIABETES MELLITUS WITH DIABETIC POLYNEUROPATHY, WITH LONG-TERM CURRENT USE OF INSULIN (HCC): ICD-10-CM

## 2024-07-17 NOTE — TELEPHONE ENCOUNTER
07/17/24 2:29 PM     Chart reviewed for Diabetic Eye Exam was/were not submitted to the patient's insurance.     Terrie Curtis MA   [facility]

## 2024-07-18 RX ORDER — INSULIN GLARGINE 100 [IU]/ML
INJECTION, SOLUTION SUBCUTANEOUS
Qty: 15 ML | Refills: 0 | Status: SHIPPED | OUTPATIENT
Start: 2024-07-18

## 2024-09-17 ENCOUNTER — VBI (OUTPATIENT)
Dept: ADMINISTRATIVE | Facility: OTHER | Age: 64
End: 2024-09-17

## 2024-09-17 NOTE — TELEPHONE ENCOUNTER
09/17/24 6:54 AM     Chart reviewed for Diabetic Eye Exam was/were not submitted to the patient's insurance.     Terrie Curtis MA   [facility]

## 2024-09-25 ENCOUNTER — VBI (OUTPATIENT)
Dept: ADMINISTRATIVE | Facility: OTHER | Age: 64
End: 2024-09-25

## 2024-09-25 NOTE — TELEPHONE ENCOUNTER
09/25/24 2:13 PM     Chart reviewed for Mammogram was/were not submitted to the patient's insurance.     Terrie Curtis MA   [facility]

## 2024-09-27 ENCOUNTER — TELEPHONE (OUTPATIENT)
Dept: FAMILY MEDICINE CLINIC | Facility: MEDICAL CENTER | Age: 64
End: 2024-09-27

## 2024-09-27 NOTE — TELEPHONE ENCOUNTER
Reached out to pt about most recent dm eye exam and pt informed me that she moved out of the area and will be seeing a new provider.

## 2024-10-06 DIAGNOSIS — E11.42 TYPE 2 DIABETES MELLITUS WITH DIABETIC POLYNEUROPATHY, WITH LONG-TERM CURRENT USE OF INSULIN (HCC): ICD-10-CM

## 2024-10-06 DIAGNOSIS — Z79.4 TYPE 2 DIABETES MELLITUS WITH DIABETIC POLYNEUROPATHY, WITH LONG-TERM CURRENT USE OF INSULIN (HCC): ICD-10-CM

## 2024-10-06 RX ORDER — INSULIN GLARGINE 100 [IU]/ML
INJECTION, SOLUTION SUBCUTANEOUS
Qty: 15 ML | Refills: 0 | Status: SHIPPED | OUTPATIENT
Start: 2024-10-06

## 2024-11-01 ENCOUNTER — VBI (OUTPATIENT)
Dept: ADMINISTRATIVE | Facility: OTHER | Age: 64
End: 2024-11-01

## 2024-11-01 NOTE — TELEPHONE ENCOUNTER
11/01/24 10:26 AM     Chart reviewed for Pap Smear (HPV) aka Cervical Cancer Screening was/were not submitted to the patient's insurance.     Terrie Curtis MA   [facility]

## 2024-11-23 ENCOUNTER — VBI (OUTPATIENT)
Dept: ADMINISTRATIVE | Facility: OTHER | Age: 64
End: 2024-11-23

## 2024-11-23 NOTE — TELEPHONE ENCOUNTER
11/23/24 9:33 AM     Chart reviewed for Diabetic Eye Exam was/were not submitted to the patient's insurance.     Terrie Curtis MA   [facility]

## 2024-12-01 DIAGNOSIS — Z79.4 TYPE 2 DIABETES MELLITUS WITH DIABETIC POLYNEUROPATHY, WITH LONG-TERM CURRENT USE OF INSULIN (HCC): ICD-10-CM

## 2024-12-01 DIAGNOSIS — E11.42 TYPE 2 DIABETES MELLITUS WITH DIABETIC POLYNEUROPATHY, WITH LONG-TERM CURRENT USE OF INSULIN (HCC): ICD-10-CM

## 2024-12-03 RX ORDER — PEN NEEDLE, DIABETIC 31 G X1/4"
NEEDLE, DISPOSABLE MISCELLANEOUS
Qty: 100 EACH | Refills: 0 | Status: SHIPPED | OUTPATIENT
Start: 2024-12-03

## 2024-12-16 ENCOUNTER — VBI (OUTPATIENT)
Dept: ADMINISTRATIVE | Facility: OTHER | Age: 64
End: 2024-12-16

## 2024-12-16 NOTE — TELEPHONE ENCOUNTER
12/16/24 1:52 PM     Chart reviewed for Mammogram ; nothing is submitted to the patient's insurance at this time.     Wiley Krishnamurthy MA   [facility]

## 2024-12-18 ENCOUNTER — VBI (OUTPATIENT)
Dept: ADMINISTRATIVE | Facility: OTHER | Age: 64
End: 2024-12-18

## 2024-12-18 NOTE — TELEPHONE ENCOUNTER
12/18/24 9:46 AM     Chart reviewed for Pap Smear (HPV) aka Cervical Cancer Screening ; nothing is submitted to the patient's insurance at this time.     Wiley Krishnamurthy MA   [facility]

## 2025-02-05 ENCOUNTER — TELEPHONE (OUTPATIENT)
Dept: FAMILY MEDICINE CLINIC | Facility: MEDICAL CENTER | Age: 65
End: 2025-02-05

## 2025-02-05 ENCOUNTER — APPOINTMENT (OUTPATIENT)
Dept: LAB | Facility: MEDICAL CENTER | Age: 65
End: 2025-02-05
Payer: COMMERCIAL

## 2025-02-05 ENCOUNTER — CONSULT (OUTPATIENT)
Dept: FAMILY MEDICINE CLINIC | Facility: MEDICAL CENTER | Age: 65
End: 2025-02-05
Payer: COMMERCIAL

## 2025-02-05 ENCOUNTER — RESULTS FOLLOW-UP (OUTPATIENT)
Dept: FAMILY MEDICINE CLINIC | Facility: MEDICAL CENTER | Age: 65
End: 2025-02-05

## 2025-02-05 VITALS
TEMPERATURE: 97.4 F | BODY MASS INDEX: 31.96 KG/M2 | HEART RATE: 71 BPM | WEIGHT: 187.2 LBS | OXYGEN SATURATION: 93 % | RESPIRATION RATE: 18 BRPM | HEIGHT: 64 IN | SYSTOLIC BLOOD PRESSURE: 100 MMHG | DIASTOLIC BLOOD PRESSURE: 54 MMHG

## 2025-02-05 DIAGNOSIS — T85.611S PERITONEAL DIALYSIS CATHETER DYSFUNCTION, SEQUELA (HCC): ICD-10-CM

## 2025-02-05 DIAGNOSIS — E03.9 ACQUIRED HYPOTHYROIDISM: Primary | ICD-10-CM

## 2025-02-05 DIAGNOSIS — N18.6 ESRD ON DIALYSIS (HCC): Chronic | ICD-10-CM

## 2025-02-05 DIAGNOSIS — Z78.0 ASYMPTOMATIC POSTMENOPAUSAL STATE: ICD-10-CM

## 2025-02-05 DIAGNOSIS — Z79.4 TYPE 2 DIABETES MELLITUS WITH DIABETIC POLYNEUROPATHY, WITH LONG-TERM CURRENT USE OF INSULIN (HCC): ICD-10-CM

## 2025-02-05 DIAGNOSIS — Z01.818 PRE-OP EXAMINATION: Primary | ICD-10-CM

## 2025-02-05 DIAGNOSIS — E11.42 TYPE 2 DIABETES MELLITUS WITH DIABETIC POLYNEUROPATHY, WITH LONG-TERM CURRENT USE OF INSULIN (HCC): ICD-10-CM

## 2025-02-05 DIAGNOSIS — I50.22 CHRONIC SYSTOLIC CONGESTIVE HEART FAILURE (HCC): ICD-10-CM

## 2025-02-05 DIAGNOSIS — Z99.2 ESRD ON DIALYSIS (HCC): Chronic | ICD-10-CM

## 2025-02-05 PROBLEM — I25.5 ISCHEMIC CARDIOMYOPATHY: Status: ACTIVE | Noted: 2023-05-02

## 2025-02-05 PROBLEM — I25.10 CORONARY ARTERY DISEASE INVOLVING NATIVE CORONARY ARTERY OF NATIVE HEART WITHOUT ANGINA PECTORIS: Status: ACTIVE | Noted: 2023-05-02

## 2025-02-05 PROBLEM — N18.9 ANEMIA IN CHRONIC KIDNEY DISEASE: Status: RESOLVED | Noted: 2021-07-13 | Resolved: 2025-02-05

## 2025-02-05 PROBLEM — T85.611A PD CATHETER DYSFUNCTION (HCC): Status: ACTIVE | Noted: 2025-02-05

## 2025-02-05 PROBLEM — Z95.1 S/P CABG X 3: Status: ACTIVE | Noted: 2023-08-26

## 2025-02-05 PROBLEM — Z87.891 FORMER SMOKER: Status: ACTIVE | Noted: 2023-05-02

## 2025-02-05 PROBLEM — I48.0 PAROXYSMAL A-FIB (HCC): Chronic | Status: ACTIVE | Noted: 2023-08-26

## 2025-02-05 PROBLEM — I50.82 BIVENTRICULAR HEART FAILURE (HCC): Chronic | Status: ACTIVE | Noted: 2023-05-02

## 2025-02-05 PROBLEM — D63.1 ANEMIA IN CHRONIC KIDNEY DISEASE: Status: RESOLVED | Noted: 2021-07-13 | Resolved: 2025-02-05

## 2025-02-05 PROBLEM — D75.839 THROMBOCYTHEMIA: Status: ACTIVE | Noted: 2023-05-02

## 2025-02-05 PROBLEM — N18.9 ANEMIA DUE TO CHRONIC KIDNEY DISEASE: Status: ACTIVE | Noted: 2023-02-15

## 2025-02-05 PROBLEM — D63.1 ANEMIA DUE TO CHRONIC KIDNEY DISEASE: Status: ACTIVE | Noted: 2023-02-15

## 2025-02-05 LAB
ALBUMIN SERPL BCG-MCNC: 2.9 G/DL (ref 3.5–5)
ALP SERPL-CCNC: 142 U/L (ref 34–104)
ALT SERPL W P-5'-P-CCNC: 8 U/L (ref 7–52)
ANION GAP SERPL CALCULATED.3IONS-SCNC: 21 MMOL/L (ref 4–13)
AST SERPL W P-5'-P-CCNC: 9 U/L (ref 13–39)
BASOPHILS # BLD AUTO: 0.05 THOUSANDS/ΜL (ref 0–0.1)
BASOPHILS NFR BLD AUTO: 1 % (ref 0–1)
BILIRUB SERPL-MCNC: 0.31 MG/DL (ref 0.2–1)
BUN SERPL-MCNC: 56 MG/DL (ref 5–25)
CALCIUM ALBUM COR SERPL-MCNC: 7.5 MG/DL (ref 8.3–10.1)
CALCIUM SERPL-MCNC: 6.6 MG/DL (ref 8.4–10.2)
CHLORIDE SERPL-SCNC: 99 MMOL/L (ref 96–108)
CHOLEST SERPL-MCNC: 152 MG/DL (ref ?–200)
CO2 SERPL-SCNC: 26 MMOL/L (ref 21–32)
CREAT SERPL-MCNC: 10.46 MG/DL (ref 0.6–1.3)
EOSINOPHIL # BLD AUTO: 0.28 THOUSAND/ΜL (ref 0–0.61)
EOSINOPHIL NFR BLD AUTO: 3 % (ref 0–6)
ERYTHROCYTE [DISTWIDTH] IN BLOOD BY AUTOMATED COUNT: 13.5 % (ref 11.6–15.1)
EST. AVERAGE GLUCOSE BLD GHB EST-MCNC: 100 MG/DL
GFR SERPL CREATININE-BSD FRML MDRD: 3 ML/MIN/1.73SQ M
GLUCOSE P FAST SERPL-MCNC: 97 MG/DL (ref 65–99)
HBA1C MFR BLD: 5.1 %
HCT VFR BLD AUTO: 28.4 % (ref 34.8–46.1)
HDLC SERPL-MCNC: 48 MG/DL
HGB BLD-MCNC: 8.6 G/DL (ref 11.5–15.4)
IMM GRANULOCYTES # BLD AUTO: 0.08 THOUSAND/UL (ref 0–0.2)
IMM GRANULOCYTES NFR BLD AUTO: 1 % (ref 0–2)
LDLC SERPL CALC-MCNC: 76 MG/DL (ref 0–100)
LYMPHOCYTES # BLD AUTO: 0.98 THOUSANDS/ΜL (ref 0.6–4.47)
LYMPHOCYTES NFR BLD AUTO: 9 % (ref 14–44)
MCH RBC QN AUTO: 31.3 PG (ref 26.8–34.3)
MCHC RBC AUTO-ENTMCNC: 30.3 G/DL (ref 31.4–37.4)
MCV RBC AUTO: 103 FL (ref 82–98)
MONOCYTES # BLD AUTO: 0.65 THOUSAND/ΜL (ref 0.17–1.22)
MONOCYTES NFR BLD AUTO: 6 % (ref 4–12)
NEUTROPHILS # BLD AUTO: 8.64 THOUSANDS/ΜL (ref 1.85–7.62)
NEUTS SEG NFR BLD AUTO: 80 % (ref 43–75)
NRBC BLD AUTO-RTO: 0 /100 WBCS
PLATELET # BLD AUTO: 233 THOUSANDS/UL (ref 149–390)
PMV BLD AUTO: 10.3 FL (ref 8.9–12.7)
POTASSIUM SERPL-SCNC: 4.3 MMOL/L (ref 3.5–5.3)
PROT SERPL-MCNC: 5.3 G/DL (ref 6.4–8.4)
RBC # BLD AUTO: 2.75 MILLION/UL (ref 3.81–5.12)
SL AMB POCT HEMOGLOBIN AIC: 5.7 (ref ?–6.5)
SODIUM SERPL-SCNC: 146 MMOL/L (ref 135–147)
T4 FREE SERPL-MCNC: 0.7 NG/DL (ref 0.61–1.12)
TRIGL SERPL-MCNC: 138 MG/DL (ref ?–150)
TSH SERPL DL<=0.05 MIU/L-ACNC: 18.07 UIU/ML (ref 0.45–4.5)
WBC # BLD AUTO: 10.68 THOUSAND/UL (ref 4.31–10.16)

## 2025-02-05 PROCEDURE — 36415 COLL VENOUS BLD VENIPUNCTURE: CPT

## 2025-02-05 PROCEDURE — 80061 LIPID PANEL: CPT

## 2025-02-05 PROCEDURE — 84439 ASSAY OF FREE THYROXINE: CPT

## 2025-02-05 PROCEDURE — 83036 HEMOGLOBIN GLYCOSYLATED A1C: CPT

## 2025-02-05 PROCEDURE — 99214 OFFICE O/P EST MOD 30 MIN: CPT | Performed by: INTERNAL MEDICINE

## 2025-02-05 PROCEDURE — G2211 COMPLEX E/M VISIT ADD ON: HCPCS | Performed by: INTERNAL MEDICINE

## 2025-02-05 PROCEDURE — 84443 ASSAY THYROID STIM HORMONE: CPT

## 2025-02-05 PROCEDURE — 85025 COMPLETE CBC W/AUTO DIFF WBC: CPT

## 2025-02-05 PROCEDURE — 83036 HEMOGLOBIN GLYCOSYLATED A1C: CPT | Performed by: INTERNAL MEDICINE

## 2025-02-05 PROCEDURE — 80053 COMPREHEN METABOLIC PANEL: CPT

## 2025-02-05 RX ORDER — AMLODIPINE BESYLATE 5 MG/1
5 TABLET ORAL 2 TIMES DAILY
COMMUNITY
Start: 2025-02-02

## 2025-02-05 RX ORDER — LEVOTHYROXINE SODIUM 50 UG/1
50 TABLET ORAL
Qty: 100 TABLET | Refills: 3 | Status: SHIPPED | OUTPATIENT
Start: 2025-02-05 | End: 2025-02-10 | Stop reason: SDUPTHER

## 2025-02-05 RX ORDER — ATORVASTATIN CALCIUM 80 MG/1
80 TABLET, FILM COATED ORAL DAILY
COMMUNITY
Start: 2025-02-04

## 2025-02-05 RX ORDER — GABAPENTIN 100 MG/1
100 CAPSULE ORAL
COMMUNITY
Start: 2025-01-11

## 2025-02-05 RX ORDER — METOPROLOL SUCCINATE 50 MG/1
50 TABLET, EXTENDED RELEASE ORAL 2 TIMES DAILY
COMMUNITY
Start: 2024-11-29

## 2025-02-05 RX ORDER — FUROSEMIDE 80 MG/1
80 TABLET ORAL 2 TIMES DAILY
COMMUNITY
Start: 2025-01-11

## 2025-02-05 NOTE — ASSESSMENT & PLAN NOTE
Lab Results   Component Value Date    EGFR 4 (L) 01/31/2025    EGFR 9 (L) 01/04/2024    EGFR 8 (L) 08/27/2023    CREATININE 10 (H) 01/31/2025    CREATININE 5.0 (H) 01/04/2024    CREATININE 5.8 (H) 08/27/2023     Continue peritoneal dialysis

## 2025-02-05 NOTE — PROGRESS NOTES
Diabetic Foot Exam    Patient's shoes and socks removed.    Right Foot/Ankle   Right Foot Inspection  Skin Exam: skin normal, skin intact, dry skin, callus and callus. No warmth, no erythema, no maceration, no abnormal color, no pre-ulcer and no ulcer.     Toe Exam: ROM and strength within normal limits and swelling.     Sensory   Monofilament testing: intact    Vascular  The right DP pulse is 2+. The right PT pulse is 2+.     Left Foot/Ankle  Left Foot Inspection  Skin Exam: skin normal, skin intact, dry skin and callus. No warmth, no erythema, no maceration, normal color, no pre-ulcer and no ulcer.     Toe Exam: ROM and strength within normal limits and swelling.     Sensory   Monofilament testing: intact    Vascular  The left DP pulse is 2+. The left PT pulse is 2+.     Assign Risk Category  No deformity present  No loss of protective sensation  No weak pulses  Risk: 0      Pre-operative Clearance  Name: Phuong Causey      : 1960      MRN: [id number]  Encounter Provider: Emily Ortiz MD  Encounter Date: 2025   Encounter department: [facility]    Assessment & Plan  Pre-op examination  Patient is medically stable for surgery       Peritoneal dialysis catheter dysfunction, sequela (HCC)  Stable.  Feels she is not getting rid of the fluid adequately with the peritoneal dialysis that she does every day.       ESRD on dialysis (HCC)  Lab Results   Component Value Date    EGFR 4 (L) 2025    EGFR 9 (L) 2024    EGFR 8 (L) 2023    CREATININE 10 (H) 2025    CREATININE 5.0 (H) 2024    CREATININE 5.8 (H) 2023     Continue peritoneal dialysis       Chronic systolic congestive heart failure (HCC)  Wt Readings from Last 3 Encounters:   25 84.9 kg (187 lb 3.2 oz)   24 56.7 kg (125 lb)   23 58.3 kg (128 lb 8 oz)     Continue furosemide               Type 2 diabetes mellitus with diabetic polyneuropathy, with long-term current use of  insulin ([facility])    Lab Results   Component Value Date    HGBA1C 5.7 05/20/2024       Orders:    POCT hemoglobin A1c  She has episodes of hypoglycemia.  Has been taking Lantus 10 units daily.  Will decrease the dose of the Lantus to 4 units daily.  Asymptomatic postmenopausal state    Orders:    DXA bone density spine hip and pelvis; Future    Pre-operative Clearance:     Clearance:  Patient is medically optimized (CLEARED) for proposed surgery without any additional cardiac testing.      Medication Instructions:   - Avoid herbs or non-directed vitamins one week prior to surgery    - Avoid aspirin containing medications or non-steroidal anti-inflammatory drugs one week preceding surgery    - May take tylenol for pain up until the night before surgery    - ACE Inhibitors or ARBs: Continue this medication up to the evening before surgery/procedure, but do not take the morning of the day of surgery.  - 5HT3 Antagonist (ie, zofran):  Continue to take this medication on your normal schedule.   - Antiepileptic meds: Continue to take this medication on your normal schedule.  - Beta blockers:  Continue to take this medication on your normal schedule.  - Calcium channel blockers: Continue to take this medication on your normal schedule.  - Diuretics: Continue taking this medication up to the evening before surgery/procedure, but do not take in the morning of the day of surgery/procedure.  - Hyperlipidemia meds: Continue to take this medication on your normal schedule.      Medicine Instructions for Adults with Diabetes (NO Bowel Prep)    Follow these instructions when a BOWEL PREP is NOT required for your procedure or surgery!    NOTE:  GLP Agonists taken weekly: do not take in the 7 days before your procedure. **Bariatric surgery: do not take 4 weeks prior to your procedure.    SGLT-2 Inhibitors: do not take in the 4 days before your procedure    On the Day Before Surgery/Procedure  If you are having a procedure (e.g.,  Colonoscopy) or surgery which DOES NOT require a bowel prep, follow the directions below based on the type of medicine you take for your diabetes.  Type of Medicine You Take Examples What to Do   Pre-Mixed Insulin Intermediate  Kkesxzi62/25, Gfdrphe99/30, Novolog 70/30, Regular Insulin Take 1/2 your regular dose the evening before our procedure.   Rapid/Fast Acting  Insulin and/or Long-Acting Insulin Humalog U200, NovoLog, Apidra,  Lantus, Levemir, Tresiba, Toujeo,  Fias, Basaglar Take your FULL regular dose the day before procedure.   Oral Diabetic Medicines (sulfonylurea) Glipizide/Glimepiride/  Glucotrol Take your regular dose with dinner the evening before your procedure.   Other Oral Diabetic Medicines Metformin, Glucophage, Glucophage  XR, Riomet, Glumetza, Actose,  Avandia, Gl set, Prandin Take your regular dose with dinner the evening before your procedure   GLP Agonists Adlyxin, Byetta, Bydureon,  Ozempic, Soliqua, Tanzeum,  Trulicity, Victoza, Saxenda,  Rybelsus, Wegovy, Mounjaro, Zepbound If taken daily, take as normal  If taken weekly, do not take this medicine for 7 days before your procedure including the day of the procedure (resume taking after the procedure). **Bariatric surgery: do not take 4 weeks prior to procedure   SGLT-2 Inhibitors Jardiance, Invokana, Farxiga, Steglatro, Brenzavvy, Qtern, Segluromet Glyxambi, Synjardy, Synjardy XR, Invokamet, InvokametXR, Trijary XR, Xigduo X Do not take for 4 days before your procedure including the day of the procedure (resume taking after the procedure)   This educational material has been approved by the Patient Education Advisory Committee.    On the Day of Surgery/Procedure  Follow the directions below based on the type of medicine you take for your diabetes.  Type of Medicine You Take  Examples What to Do   Long-Acting Insulin Lantus, Levemir, Tresiba,  Toujeo, Basaglar, Semglee If you normally take your Long Acting Insulin in the morning, take the  full dose as scheduled.   GLP-I Agonists Adlyxin, Byetta, Bydureon,  Ozempic, Soliqua, Tanzeum,  Trulicity, Victoza, Saxenda,  Rybelsus, Mounjaro Do NOT take this medicine on the day of your procedure (resume taking after the procedure)   Except for the morning Long-Acting Insulin, DO NOT take ANY diabetic medicine on the day of your procedure unless you were instructed by the doctor who manages your diabetes medicines.  Continue to check your blood sugars.  If you have an insulin pump, ask your endocrinologist for instructions at least 3 days before your procedure. NOTE: If you are not able to ask your endocrinologist in advance, on the day of the procedure set your insulin pump to your basal rate only. Bring your insulin pump supplies to the hospital.         History of Present Illness   Patient was recently seen in [facility] in [place] with fluid overload.  She has been doing peritoneal dialysis every day but does not complete the process as she starts getting pain in her abdomen.  She feels bloated and has swelling on her face, on in her abdomen and in the ankles.  She was told that there is dysfunction of the peritoneal dialysis catheter which needs to be changed.  The surgery is scheduled in 2 days.  EKG done in the office did not show any acute changes.        Pre-op Exam    Pre-operative Risk Factors:    History of cerebrovascular disease: No    History of ischemic heart disease: Yes    Pre-operative treatment with insulin: Yes  Pre-operative creatinine >2 mg/dL: Yes  History of congestive heart failure: Yes    Review of Systems   Constitutional:  Positive for fatigue. Negative for chills, diaphoresis and fever.   HENT:  Negative for congestion, ear discharge, ear pain, hearing loss, postnasal drip, rhinorrhea, sinus pressure, sinus pain, sneezing, sore throat and voice change.    Eyes:  Negative for pain, discharge, redness and visual disturbance.   Respiratory:  Positive for shortness of breath.  Negative for cough, chest tightness and wheezing.    Cardiovascular:  Positive for leg swelling. Negative for chest pain and palpitations.   Gastrointestinal:  Negative for abdominal distention, abdominal pain, blood in stool, constipation, diarrhea, nausea and vomiting.   Endocrine: Negative for cold intolerance, heat intolerance, polydipsia, polyphagia and polyuria.   Genitourinary:  Negative for dysuria, flank pain, frequency, hematuria and urgency.   Musculoskeletal:  Negative for arthralgias, back pain, gait problem, joint swelling, myalgias, neck pain and neck stiffness.   Skin:  Negative for rash.   Neurological:  Negative for dizziness, tremors, syncope, facial asymmetry, speech difficulty, weakness, light-headedness, numbness and headaches.   Hematological:  Does not bruise/bleed easily.   Psychiatric/Behavioral:  Negative for behavioral problems, confusion and sleep disturbance. The patient is not nervous/anxious.      Past Medical History   Past Medical History:   Diagnosis Date    Abnormal CBC 2018    Anxiety     Benign hypertension     Cigarette nicotine dependence without complication 2017    CTS (carpal tunnel syndrome)     Essential hypertriglyceridemia     Hyperlipidemia     last assessed 2017    Overweight 3/5/2019    Sebaceous cyst     Type 2 diabetes mellitus (HCC)     last assessed 2017     Past Surgical History:   Procedure Laterality Date    CHOLECYSTECTOMY LAPAROSCOPIC      KNEE SURGERY Right      Family History   Problem Relation Age of Onset    Other Mother         acute subdural hematoma    Coronary artery disease Mother         CABG    Lung cancer Father     Gout Brother     Lung cancer Brother      Social History     Tobacco Use    Smoking status: Former     Current packs/day: 0.00     Average packs/day: 0.5 packs/day for 15.0 years (7.5 ttl pk-yrs)     Types: Cigarettes     Start date: 2004     Quit date: 2019     Years since quittin.1    Smokeless  tobacco: Never   Vaping Use    Vaping status: Never Used   Substance and Sexual Activity    Alcohol use: No    Drug use: No    Sexual activity: Yes     Partners: Male     Current Outpatient Medications on File Prior to Visit   Medication Sig    acetaminophen (TYLENOL) 325 mg tablet Take 975 mg by mouth    amLODIPine (NORVASC) 5 mg tablet Take 5 mg by mouth 2 (two) times a day    atorvastatin (LIPITOR) 80 mg tablet Take 80 mg by mouth daily    Blood Glucose Monitoring Suppl (OneTouch Verio Reflect) w/Device KIT Check blood sugars once daily. Please substitute with appropriate alternative as covered by patient's insurance. Dx: E11.65    calcitriol (ROCALTROL) 0.5 MCG capsule Take 1 mcg by mouth daily    ergocalciferol (ERGOCALCIFEROL) 1.25 MG (51071 UT) capsule Take 50,000 Units by mouth    ezetimibe (ZETIA) 10 mg tablet Take 10 mg by mouth daily    furosemide (LASIX) 80 mg tablet Take 80 mg by mouth 2 (two) times a day    gabapentin (NEURONTIN) 100 mg capsule Take 100 mg by mouth daily at bedtime    glucose blood (OneTouch Verio) test strip Check blood sugars once daily. Please substitute with appropriate alternative as covered by patient's insurance. Dx: E11.65    hydrALAZINE (APRESOLINE) 50 mg tablet Take 50 mg by mouth 3 (three) times a day    Insulin Pen Needle (Droplet Pen Needles) 31G X 6 MM MISC use 1 PEN NEEDLE to inject MEDICATION subcutaneously once daily    Lancets (OneTouch Delica Plus [id number]) MISC Use 1 Device in the morning    Lantus SoloStar 100 units/mL SOPN inject 10 units subcutaneously daily    losartan (COZAAR) 100 MG tablet Take 100 mg by mouth daily at bedtime    metoprolol succinate (TOPROL-XL) 50 mg 24 hr tablet Take 50 mg by mouth 2 (two) times a day    omeprazole (PriLOSEC) 20 mg delayed release capsule Take 20 mg by mouth daily    ondansetron (ZOFRAN) 4 mg tablet take 1 tablet by mouth every 8 hours if needed for nausea and vomiting    potassium chloride (K-DUR,KLOR-CON) 20 mEq tablet  "Take 20 mEq by mouth    sevelamer carbonate (RENVELA) 800 mg tablet take 2 tablets by mouth three times a day with meals    [DISCONTINUED] amLODIPine (NORVASC) 2.5 mg tablet Take 5 mg by mouth    [DISCONTINUED] atorvastatin (LIPITOR) 20 mg tablet Take 1 tablet (20 mg total) by mouth daily    [DISCONTINUED] furosemide (LASIX) 40 mg tablet 2 (two) times a day Take 1 tablets in the morning and 1 tablet at night    [DISCONTINUED] metoprolol succinate (TOPROL-XL) 100 mg 24 hr tablet Take 1 tablet (100 mg total) by mouth daily (Patient taking differently: Take 50 mg by mouth daily)    [DISCONTINUED] Cholecalciferol (Vitamin D3) 1.25 MG (19998 UT) CAPS Take by mouth (Patient not taking: Reported on 5/20/2024)     Allergies   Allergen Reactions    Wound Dressing Adhesive Rash     Objective   /54 (BP Location: Left arm, Patient Position: Sitting, Cuff Size: Large)   Pulse 71   Temp (!) 97.4 °F (36.3 °C) (Temporal)   Resp 18   Ht 5' 4\" (1.626 m)   Wt 84.9 kg (187 lb 3.2 oz)   SpO2 93%   BMI 32.13 kg/m²     Physical Exam  Constitutional:       General: She is not in acute distress.     Appearance: She is well-developed. She is not diaphoretic.   HENT:      Head: Normocephalic and atraumatic.      Right Ear: External ear normal.      Left Ear: External ear normal.      Nose: Nose normal.   Eyes:      General: No scleral icterus.        Right eye: No discharge.         Left eye: No discharge.      Conjunctiva/sclera: Conjunctivae normal.   Cardiovascular:      Rate and Rhythm: Normal rate and regular rhythm.      Pulses: no weak pulses.           Dorsalis pedis pulses are 2+ on the right side and 2+ on the left side.        Posterior tibial pulses are 2+ on the right side and 2+ on the left side.      Heart sounds: Normal heart sounds. No murmur heard.     No friction rub. No gallop.   Pulmonary:      Effort: Pulmonary effort is normal. No respiratory distress.      Breath sounds: Normal breath sounds. No wheezing " or rales.   Abdominal:      General: Bowel sounds are normal. There is no distension.      Palpations: Abdomen is soft.      Tenderness: There is no abdominal tenderness. There is no guarding or rebound.   Musculoskeletal:         General: No tenderness.      Right lower leg: Edema present.      Left lower leg: Edema present.   Feet:      Right foot:      Skin integrity: Callus and dry skin present. No ulcer, skin breakdown, erythema or warmth.      Left foot:      Skin integrity: Callus and dry skin present. No ulcer, skin breakdown, erythema or warmth.   Skin:     General: Skin is warm and dry.      Findings: No erythema or rash.   Neurological:      Mental Status: She is alert and oriented to person, place, and time.      Cranial Nerves: No cranial nerve deficit.      Sensory: No sensory deficit.      Motor: No abnormal muscle tone.   Psychiatric:         Behavior: Behavior normal.           Emily Ortiz MD

## 2025-02-05 NOTE — ASSESSMENT & PLAN NOTE
Lab Results   Component Value Date    HGBA1C 5.7 05/20/2024       Orders:    POCT hemoglobin A1c  She has episodes of hypoglycemia.  Has been taking Lantus 10 units daily.  Will decrease the dose of the Lantus to 4 units daily.

## 2025-02-05 NOTE — ASSESSMENT & PLAN NOTE
Wt Readings from Last 3 Encounters:   02/05/25 84.9 kg (187 lb 3.2 oz)   05/20/24 56.7 kg (125 lb)   02/02/23 58.3 kg (128 lb 8 oz)     Continue furosemide

## 2025-02-10 DIAGNOSIS — E03.9 ACQUIRED HYPOTHYROIDISM: ICD-10-CM

## 2025-02-10 RX ORDER — LEVOTHYROXINE SODIUM 50 UG/1
50 TABLET ORAL
Qty: 100 TABLET | Refills: 3 | Status: SHIPPED | OUTPATIENT
Start: 2025-02-10

## 2025-02-10 NOTE — TELEPHONE ENCOUNTER
NOT A DUPLICATE:  Patient confirmed medication went to the wrong pharmacy. Requesting script sent to [email or web address] pharmacy.     Reason for call:   [x] Refill   [] Prior Auth  [] Other:     Office:   [x] PCP/Provider -   [] Specialty/Provider -     Medication: levothyroxine 50 mcg     Dose/Frequency: Take 1 tablet (50 mcg total) by mouth daily in the early morning     Quantity: 100    Pharmacy: [id number] - [place] - [address]     Does the patient have enough for 3 days?   [] Yes   [x] No - Send as HP to POD

## 2025-02-19 ENCOUNTER — TELEPHONE (OUTPATIENT)
Dept: FAMILY MEDICINE CLINIC | Facility: MEDICAL CENTER | Age: 65
End: 2025-02-19

## 2025-02-19 NOTE — TELEPHONE ENCOUNTER
Patient calling to check that levothryroxine was resent to the correct pharmacy [facility] [place]. Advised patient script was sent on 2/10/25.

## 2025-04-08 ENCOUNTER — TELEPHONE (OUTPATIENT)
Dept: FAMILY MEDICINE CLINIC | Facility: MEDICAL CENTER | Age: 65
End: 2025-04-08

## 2025-04-08 DIAGNOSIS — Z12.31 ENCOUNTER FOR SCREENING MAMMOGRAM FOR BREAST CANCER: ICD-10-CM

## 2025-04-08 NOTE — TELEPHONE ENCOUNTER
Reached out to pt for most recent dm eye exam information. Pt stated that she will make an appt with her eye doctor. I also placed the order for a mammogram because the last order was .

## 2025-04-08 NOTE — TELEPHONE ENCOUNTER
----- Message from Elizabeth SANDHU sent at 4/8/2025  1:03 PM EDT -----  Regarding: DM EYE MAMMO  Please follow up with patient to see if she had her DM eye exam done. Please obtain info for report. Patient also due for mammo, needs order placed.

## 2025-05-07 ENCOUNTER — VBI (OUTPATIENT)
Dept: ADMINISTRATIVE | Facility: OTHER | Age: 65
End: 2025-05-07

## 2025-05-07 NOTE — TELEPHONE ENCOUNTER
05/07/25 9:56 AM     Chart reviewed for Diabetic Eye Exam was/were not submitted to the patient's insurance.     Terrie Curtis MA   [facility]

## 2025-07-16 ENCOUNTER — VBI (OUTPATIENT)
Dept: ADMINISTRATIVE | Facility: OTHER | Age: 65
End: 2025-07-16

## 2025-07-16 NOTE — TELEPHONE ENCOUNTER
07/16/25 10:47 AM     Chart reviewed for Diabetic Eye Exam was/were not submitted to the patient's insurance.     Terrie Curtis MA   [facility]

## 2025-08-10 ENCOUNTER — RA CDI HCC (OUTPATIENT)
Dept: OTHER | Facility: HOSPITAL | Age: 65
End: 2025-08-10

## 2025-08-20 ENCOUNTER — VBI (OUTPATIENT)
Dept: ADMINISTRATIVE | Facility: OTHER | Age: 65
End: 2025-08-20